# Patient Record
Sex: FEMALE | Race: WHITE | Employment: OTHER | URBAN - METROPOLITAN AREA
[De-identification: names, ages, dates, MRNs, and addresses within clinical notes are randomized per-mention and may not be internally consistent; named-entity substitution may affect disease eponyms.]

---

## 2017-01-12 RX ORDER — BUDESONIDE AND FORMOTEROL FUMARATE DIHYDRATE 160; 4.5 UG/1; UG/1
AEROSOL RESPIRATORY (INHALATION)
Qty: 10.2 INHALER | Refills: 6 | Status: SHIPPED | OUTPATIENT
Start: 2017-01-12 | End: 2017-08-02

## 2017-01-12 RX ORDER — ESCITALOPRAM OXALATE 10 MG/1
TABLET ORAL
Qty: 30 TABLET | Refills: 3 | Status: SHIPPED | OUTPATIENT
Start: 2017-01-12 | End: 2017-04-30

## 2017-01-12 RX ORDER — IPRATROPIUM/ALBUTEROL SULFATE 20-100 MCG
MIST INHALER (GRAM) INHALATION
Qty: 1 INHALER | Refills: 6 | Status: SHIPPED | OUTPATIENT
Start: 2017-01-12 | End: 2017-06-12

## 2017-01-23 ENCOUNTER — OFFICE VISIT (OUTPATIENT)
Dept: INTERNAL MEDICINE CLINIC | Facility: CLINIC | Age: 67
End: 2017-01-23

## 2017-01-23 VITALS
WEIGHT: 193 LBS | DIASTOLIC BLOOD PRESSURE: 70 MMHG | SYSTOLIC BLOOD PRESSURE: 132 MMHG | OXYGEN SATURATION: 95 % | HEART RATE: 56 BPM | TEMPERATURE: 98 F | HEIGHT: 68 IN | BODY MASS INDEX: 29.25 KG/M2

## 2017-01-23 DIAGNOSIS — J43.9 PULMONARY EMPHYSEMA, UNSPECIFIED EMPHYSEMA TYPE (HCC): ICD-10-CM

## 2017-01-23 DIAGNOSIS — R05.9 COUGH: Primary | ICD-10-CM

## 2017-01-23 DIAGNOSIS — F32.4 MAJOR DEPRESSIVE DISORDER WITH SINGLE EPISODE, IN PARTIAL REMISSION (HCC): ICD-10-CM

## 2017-01-23 PROCEDURE — 99212 OFFICE O/P EST SF 10 MIN: CPT | Performed by: INTERNAL MEDICINE

## 2017-01-23 PROCEDURE — 99213 OFFICE O/P EST LOW 20 MIN: CPT | Performed by: INTERNAL MEDICINE

## 2017-01-23 RX ORDER — ALBUTEROL SULFATE 90 UG/1
2 AEROSOL, METERED RESPIRATORY (INHALATION) EVERY 6 HOURS PRN
Qty: 1 INHALER | Refills: 3 | Status: SHIPPED | OUTPATIENT
Start: 2017-01-23 | End: 2017-07-23

## 2017-01-23 RX ORDER — AZITHROMYCIN 250 MG/1
TABLET, FILM COATED ORAL
Qty: 6 TABLET | Refills: 0 | Status: SHIPPED | OUTPATIENT
Start: 2017-01-23 | End: 2017-01-27

## 2017-01-23 NOTE — PATIENT INSTRUCTIONS
ASSESSMENT/PLAN:   Diagnoses and all orders for this visit:    Cough  Patient with moderate cough and wheezing, recommend antibiotic and should call if not better  Pulmonary emphysema, unspecified emphysema type  Patient's combivent is not covered by her i

## 2017-01-23 NOTE — PROGRESS NOTES
HPI:    Patient ID: Alena Weiss is a 61year old female. Cough  This is a new problem. The problem has been gradually worsening. The cough is productive of purulent sputum. Associated symptoms include ear congestion and nasal congestion.  Pertinent well-nourished. No distress. Cardiovascular: Normal rate and regular rhythm. Edema not present. Pulmonary/Chest: Effort normal.   Moderate insp/exp wheezing.             ASSESSMENT/PLAN:   Diagnoses and all orders for this visit:    Cough  Patient wi

## 2017-02-06 RX ORDER — TRIAMTERENE AND HYDROCHLOROTHIAZIDE 37.5; 25 MG/1; MG/1
TABLET ORAL
Qty: 30 TABLET | Refills: 4 | Status: SHIPPED | OUTPATIENT
Start: 2017-02-06 | End: 2017-06-12

## 2017-05-01 RX ORDER — METOPROLOL TARTRATE 50 MG/1
TABLET, FILM COATED ORAL
Qty: 60 TABLET | Refills: 0 | Status: SHIPPED | OUTPATIENT
Start: 2017-05-01 | End: 2017-06-07

## 2017-05-01 RX ORDER — ESCITALOPRAM OXALATE 10 MG/1
TABLET ORAL
Qty: 30 TABLET | Refills: 0 | Status: SHIPPED | OUTPATIENT
Start: 2017-05-01 | End: 2017-06-07

## 2017-05-06 ENCOUNTER — HOSPITAL ENCOUNTER (EMERGENCY)
Facility: HOSPITAL | Age: 67
Discharge: HOME | End: 2017-05-06
Attending: EMERGENCY MEDICINE
Payer: COMMERCIAL

## 2017-05-06 ENCOUNTER — APPOINTMENT (OUTPATIENT)
Dept: GENERAL RADIOLOGY | Facility: HOSPITAL | Age: 67
End: 2017-05-06
Attending: EMERGENCY MEDICINE
Payer: COMMERCIAL

## 2017-05-06 ENCOUNTER — TELEPHONE (OUTPATIENT)
Dept: INTERNAL MEDICINE CLINIC | Facility: CLINIC | Age: 67
End: 2017-05-06

## 2017-05-06 VITALS
HEIGHT: 68 IN | TEMPERATURE: 97 F | OXYGEN SATURATION: 94 % | WEIGHT: 190 LBS | DIASTOLIC BLOOD PRESSURE: 84 MMHG | SYSTOLIC BLOOD PRESSURE: 162 MMHG | RESPIRATION RATE: 18 BRPM | HEART RATE: 75 BPM | BODY MASS INDEX: 28.79 KG/M2

## 2017-05-06 DIAGNOSIS — J44.1 COPD EXACERBATION (HCC): Primary | ICD-10-CM

## 2017-05-06 PROCEDURE — 71010 XR CHEST AP PORTABLE  (CPT=71010): CPT | Performed by: EMERGENCY MEDICINE

## 2017-05-06 PROCEDURE — 99284 EMERGENCY DEPT VISIT MOD MDM: CPT

## 2017-05-06 PROCEDURE — 94644 CONT INHLJ TX 1ST HOUR: CPT

## 2017-05-06 RX ORDER — ALBUTEROL SULFATE 2.5 MG/3ML
2.5 SOLUTION RESPIRATORY (INHALATION) EVERY 4 HOURS PRN
Qty: 30 AMPULE | Refills: 0 | Status: SHIPPED | OUTPATIENT
Start: 2017-05-06 | End: 2021-07-07

## 2017-05-06 RX ORDER — DOXYCYCLINE 100 MG/1
100 CAPSULE ORAL 2 TIMES DAILY
Qty: 14 CAPSULE | Refills: 0 | Status: SHIPPED | OUTPATIENT
Start: 2017-05-06 | End: 2017-05-13

## 2017-05-06 RX ORDER — ALBUTEROL SULFATE 2.5 MG/3ML
2.5 SOLUTION RESPIRATORY (INHALATION) CONTINUOUS
Status: DISCONTINUED | OUTPATIENT
Start: 2017-05-06 | End: 2017-05-06

## 2017-05-06 RX ORDER — PREDNISONE 20 MG/1
60 TABLET ORAL ONCE
Status: COMPLETED | OUTPATIENT
Start: 2017-05-06 | End: 2017-05-06

## 2017-05-06 RX ORDER — PREDNISONE 10 MG/1
60 TABLET ORAL DAILY
Qty: 42 TABLET | Refills: 0 | Status: SHIPPED | OUTPATIENT
Start: 2017-05-06 | End: 2017-05-18

## 2017-05-06 NOTE — TELEPHONE ENCOUNTER
Pt complaining of shortness of breath, has had a cold and chest congestion, inhalers no help. Advised going to ER she agrees and will go.  TN

## 2017-05-06 NOTE — ED PROVIDER NOTES
Patient Seen in: [facility] AND [facility] Emergency Department    History   Patient presents with:  Dyspnea VELIA SOB (respiratory)    Stated Complaint: SOB    HPI    The patient presents complaining of increasing shortness of breath and cough for the past 3-4 da (18 mcg total) into the lungs daily.    Ipratropium Bromide (ATROVENT) 0.02 % Inhalation Solution,         Family History   Problem Relation Age of Onset   • Diabetes Father    • Cancer Mother 76     renal cancer   • Hypertension Mother    • Diabetes Patern place, and time. She appears well-developed and well-nourished. No distress. HENT:   Head: Normocephalic and atraumatic. Eyes: Conjunctivae are normal. Right eye exhibits no discharge. Left eye exhibits no discharge. Neck: Normal range of motion.  Nec mg total) by mouth daily.  Taper 60 mg daily to 0 mg daily over 12 days. (60, 60, 50, 50, 40, 40, 30, 30, 20, 20, 10, 10), Script printed, Disp-42 tablet, R-0    Doxycycline Monohydrate 100 MG Oral Cap  Take 1 capsule (100 mg total) by mouth 2 (two) times d

## 2017-05-06 NOTE — ED INITIAL ASSESSMENT (HPI)
Pt c/o exertional dyspnea since Tuesday, SOB increasing. Pt spoke with PMD and was referred to ED. Hx of COPD, not on o2 at home.

## 2017-05-08 ENCOUNTER — OFFICE VISIT (OUTPATIENT)
Dept: INTERNAL MEDICINE CLINIC | Facility: CLINIC | Age: 67
End: 2017-05-08

## 2017-05-08 VITALS
OXYGEN SATURATION: 94 % | SYSTOLIC BLOOD PRESSURE: 151 MMHG | DIASTOLIC BLOOD PRESSURE: 87 MMHG | HEIGHT: 68 IN | HEART RATE: 66 BPM | BODY MASS INDEX: 28.64 KG/M2 | TEMPERATURE: 98 F | WEIGHT: 189 LBS

## 2017-05-08 DIAGNOSIS — J44.1 COPD EXACERBATION (HCC): Primary | ICD-10-CM

## 2017-05-08 PROCEDURE — 99213 OFFICE O/P EST LOW 20 MIN: CPT | Performed by: INTERNAL MEDICINE

## 2017-05-08 PROCEDURE — 99212 OFFICE O/P EST SF 10 MIN: CPT | Performed by: INTERNAL MEDICINE

## 2017-05-08 NOTE — PROGRESS NOTES
HPI:    Patient ID: Martina Redd is a 61year old female. HPI  COPD exac  Patient had bad cough started 5/1 but didn't call until 5/6 when she was very SOB and was sent to ER 5/7.  She was given 1 hour of nebs with improvement and sent home on predni 0.02 % Inhalation Solution  Disp:  Rfl: 0   azithromycin (ZITHROMAX Z-BRAYDON) 250 MG Oral Tab Take two tablets by mouth today, then one tablet daily.  Disp: 6 tablet Rfl: 0   COMBIVENT RESPIMAT  MCG/ACT Inhalation Aero Soln INHALE 2 PUFFS INTO THE LUNGS

## 2017-05-08 NOTE — PATIENT INSTRUCTIONS
ASSESSMENT/PLAN:   Diagnoses and all orders for this visit:    COPD exacerbation ([facility] [address]  Patient with moderate wheezing and exacerbation of her COPD. Should continue the steroid taper as directed.  Needs to use the nebulizer every 3-4 hours when awake

## 2017-06-07 RX ORDER — ESCITALOPRAM OXALATE 10 MG/1
TABLET ORAL
Qty: 30 TABLET | Refills: 2 | Status: SHIPPED | OUTPATIENT
Start: 2017-06-07 | End: 2017-08-30

## 2017-06-07 NOTE — TELEPHONE ENCOUNTER
Refill protocol failed, labs out of date range.   KK please advise on refill request.    Hypertensive Medications  Protocol Criteria:  · Appointment scheduled in the past 6 months or in the next 3 months  · BMP or CMP in the past 12 months  · Creatinine res

## 2017-06-07 NOTE — TELEPHONE ENCOUNTER
Refill protocol criteria met, rx sent.       Refill Protocol Appointment Criteria  · Appointment scheduled in the past 6 months or in the next 3 months  Recent Visits       Provider Department Primary Dx    1 month ago Michael Chen MD [facility], [facility]

## 2017-06-08 RX ORDER — METOPROLOL TARTRATE 50 MG/1
TABLET, FILM COATED ORAL
Qty: 60 TABLET | Refills: 0 | Status: SHIPPED | OUTPATIENT
Start: 2017-06-08 | End: 2017-06-12

## 2017-06-12 ENCOUNTER — OFFICE VISIT (OUTPATIENT)
Dept: INTERNAL MEDICINE CLINIC | Facility: CLINIC | Age: 67
End: 2017-06-12

## 2017-06-12 VITALS
HEART RATE: 67 BPM | SYSTOLIC BLOOD PRESSURE: 123 MMHG | DIASTOLIC BLOOD PRESSURE: 76 MMHG | OXYGEN SATURATION: 94 % | TEMPERATURE: 99 F | BODY MASS INDEX: 29 KG/M2 | WEIGHT: 191 LBS

## 2017-06-12 DIAGNOSIS — I10 HYPERTENSION, BENIGN: ICD-10-CM

## 2017-06-12 DIAGNOSIS — J43.9 PULMONARY EMPHYSEMA, UNSPECIFIED EMPHYSEMA TYPE (HCC): Primary | ICD-10-CM

## 2017-06-12 PROCEDURE — 99212 OFFICE O/P EST SF 10 MIN: CPT | Performed by: INTERNAL MEDICINE

## 2017-06-12 PROCEDURE — 99213 OFFICE O/P EST LOW 20 MIN: CPT | Performed by: INTERNAL MEDICINE

## 2017-06-12 RX ORDER — METOPROLOL TARTRATE 50 MG/1
50 TABLET, FILM COATED ORAL 2 TIMES DAILY
Qty: 60 TABLET | Refills: 6 | Status: SHIPPED | OUTPATIENT
Start: 2017-06-12 | End: 2018-03-06

## 2017-06-12 RX ORDER — TRIAMTERENE AND HYDROCHLOROTHIAZIDE 37.5; 25 MG/1; MG/1
1 TABLET ORAL
Qty: 30 TABLET | Refills: 6 | Status: SHIPPED | OUTPATIENT
Start: 2017-06-12 | End: 2017-12-16

## 2017-06-12 NOTE — PROGRESS NOTES
HPI:    Patient ID: Ledy Martínez is a 61year old female. HPI  COPD  Patient taking spiriva, symbicort and her rescue. Breathing better than her last exacerbation, Still with miserable cough, gautam in am and later in the evening.  still smoking 2 cigar every 6 (six) hours as needed for Wheezing. Disp: 1 Inhaler Rfl: 3   SYMBICORT 160-4.5 MCG/ACT Inhalation Aerosol INHALE 2 PUFFS INTO THE LUNGS 2 (TWO) TIMES DAILY.  Disp: 10.2 Inhaler Rfl: 6   Tiotropium Bromide Monohydrate 18 MCG Inhalation Cap Inhale 1 c

## 2017-06-12 NOTE — PATIENT INSTRUCTIONS
ASSESSMENT/PLAN:   Diagnoses and all orders for this visit:    Pulmonary emphysema, unspecified emphysema type ([facility] [address]  Patient overall improved, likely related to her continued smoking but better. Encouraged her to stop alltogether.  Recommend she add mu

## 2017-06-19 RX ORDER — TIOTROPIUM BROMIDE 18 UG/1
CAPSULE ORAL; RESPIRATORY (INHALATION)
Qty: 30 CAPSULE | Refills: 6 | Status: SHIPPED | OUTPATIENT
Start: 2017-06-19 | End: 2018-06-20

## 2017-07-18 ENCOUNTER — HOSPITAL ENCOUNTER (EMERGENCY)
Facility: HOSPITAL | Age: 67
Discharge: HOME | End: 2017-07-18
Attending: EMERGENCY MEDICINE
Payer: COMMERCIAL

## 2017-07-18 ENCOUNTER — TELEPHONE (OUTPATIENT)
Dept: INTERNAL MEDICINE CLINIC | Facility: CLINIC | Age: 67
End: 2017-07-18

## 2017-07-18 ENCOUNTER — APPOINTMENT (OUTPATIENT)
Dept: CT IMAGING | Facility: HOSPITAL | Age: 67
End: 2017-07-18
Attending: EMERGENCY MEDICINE
Payer: COMMERCIAL

## 2017-07-18 ENCOUNTER — TELEPHONE (OUTPATIENT)
Dept: DERMATOLOGY CLINIC | Facility: CLINIC | Age: 67
End: 2017-07-18

## 2017-07-18 VITALS
RESPIRATION RATE: 17 BRPM | HEART RATE: 65 BPM | SYSTOLIC BLOOD PRESSURE: 124 MMHG | TEMPERATURE: 99 F | DIASTOLIC BLOOD PRESSURE: 70 MMHG | OXYGEN SATURATION: 97 % | BODY MASS INDEX: 28.79 KG/M2 | HEIGHT: 68 IN | WEIGHT: 190 LBS

## 2017-07-18 DIAGNOSIS — L03.115 CELLULITIS OF RIGHT LOWER EXTREMITY: ICD-10-CM

## 2017-07-18 DIAGNOSIS — R42 EPISODE OF DIZZINESS: Primary | ICD-10-CM

## 2017-07-18 LAB
ANION GAP SERPL CALC-SCNC: 8 MMOL/L (ref 0–18)
BACTERIA UR QL AUTO: NEGATIVE /HPF
BASOPHILS # BLD: 0.1 K/UL (ref 0–0.2)
BASOPHILS NFR BLD: 1 %
BILIRUB UR QL: NEGATIVE
BUN SERPL-MCNC: 21 MG/DL (ref 8–20)
BUN/CREAT SERPL: 16.9 (ref 10–20)
CALCIUM SERPL-MCNC: 8.8 MG/DL (ref 8.5–10.5)
CHLORIDE SERPL-SCNC: 100 MMOL/L (ref 95–110)
CLARITY UR: CLEAR
CO2 SERPL-SCNC: 27 MMOL/L (ref 22–32)
COLOR UR: YELLOW
CREAT SERPL-MCNC: 1.24 MG/DL (ref 0.5–1.5)
EOSINOPHIL # BLD: 0.1 K/UL (ref 0–0.7)
EOSINOPHIL NFR BLD: 1 %
ERYTHROCYTE [DISTWIDTH] IN BLOOD BY AUTOMATED COUNT: 13.7 % (ref 11–15)
GLUCOSE SERPL-MCNC: 112 MG/DL (ref 70–99)
GLUCOSE UR-MCNC: NEGATIVE MG/DL
HCT VFR BLD AUTO: 37.4 % (ref 35–48)
HGB BLD-MCNC: 12.7 G/DL (ref 12–16)
HGB UR QL STRIP.AUTO: NEGATIVE
KETONES UR-MCNC: NEGATIVE MG/DL
LYMPHOCYTES # BLD: 1 K/UL (ref 1–4)
LYMPHOCYTES NFR BLD: 12 %
MCH RBC QN AUTO: 31.1 PG (ref 27–32)
MCHC RBC AUTO-ENTMCNC: 34.1 G/DL (ref 32–37)
MCV RBC AUTO: 91.2 FL (ref 80–100)
MONOCYTES # BLD: 0.8 K/UL (ref 0–1)
MONOCYTES NFR BLD: 9 %
NEUTROPHILS # BLD AUTO: 6.7 K/UL (ref 1.8–7.7)
NEUTROPHILS NFR BLD: 77 %
NITRITE UR QL STRIP.AUTO: NEGATIVE
OSMOLALITY UR CALC.SUM OF ELEC: 284 MOSM/KG (ref 275–295)
PH UR: 7 [PH] (ref 5–8)
PLATELET # BLD AUTO: 151 K/UL (ref 140–400)
PMV BLD AUTO: 7.9 FL (ref 7.4–10.3)
POTASSIUM SERPL-SCNC: 3.5 MMOL/L (ref 3.3–5.1)
PROT UR-MCNC: NEGATIVE MG/DL
RBC # BLD AUTO: 4.1 M/UL (ref 3.7–5.4)
RBC #/AREA URNS AUTO: 2 /HPF
SODIUM SERPL-SCNC: 135 MMOL/L (ref 136–144)
SP GR UR STRIP: 1.01 (ref 1–1.03)
TROPONIN I SERPL-MCNC: 0.01 NG/ML (ref ?–0.03)
TROPONIN I SERPL-MCNC: 0.01 NG/ML (ref ?–0.03)
UROBILINOGEN UR STRIP-ACNC: <2
VIT C UR-MCNC: NEGATIVE MG/DL
WBC # BLD AUTO: 8.7 K/UL (ref 4–11)
WBC #/AREA URNS AUTO: 1 /HPF

## 2017-07-18 PROCEDURE — 93010 ELECTROCARDIOGRAM REPORT: CPT | Performed by: EMERGENCY MEDICINE

## 2017-07-18 PROCEDURE — 70450 CT HEAD/BRAIN W/O DYE: CPT | Performed by: EMERGENCY MEDICINE

## 2017-07-18 PROCEDURE — 96360 HYDRATION IV INFUSION INIT: CPT

## 2017-07-18 PROCEDURE — 99285 EMERGENCY DEPT VISIT HI MDM: CPT

## 2017-07-18 PROCEDURE — 93005 ELECTROCARDIOGRAM TRACING: CPT

## 2017-07-18 PROCEDURE — 80048 BASIC METABOLIC PNL TOTAL CA: CPT | Performed by: EMERGENCY MEDICINE

## 2017-07-18 PROCEDURE — 85025 COMPLETE CBC W/AUTO DIFF WBC: CPT | Performed by: EMERGENCY MEDICINE

## 2017-07-18 PROCEDURE — 84484 ASSAY OF TROPONIN QUANT: CPT | Performed by: EMERGENCY MEDICINE

## 2017-07-18 PROCEDURE — 96361 HYDRATE IV INFUSION ADD-ON: CPT

## 2017-07-18 PROCEDURE — 81001 URINALYSIS AUTO W/SCOPE: CPT | Performed by: EMERGENCY MEDICINE

## 2017-07-18 RX ORDER — CEPHALEXIN 500 MG/1
500 CAPSULE ORAL 3 TIMES DAILY
Qty: 30 CAPSULE | Refills: 0 | Status: SHIPPED | OUTPATIENT
Start: 2017-07-18 | End: 2017-07-28

## 2017-07-18 NOTE — TELEPHONE ENCOUNTER
Patient states had episode today of feeling weak, dizzy, nausea, and then vomited clear was taking shower. Denies chest pain or SOB.    Do you want her to go to ER?

## 2017-07-18 NOTE — ED INITIAL ASSESSMENT (HPI)
Pt presents to ED after near syncopal episode at home pta. Pt states episode lasted apprx. 30 minutes and she felt \"dizzy and nauseated\". Denies cp or sob. Also wants right ankle \"bite\" evaluated.

## 2017-07-18 NOTE — TELEPHONE ENCOUNTER
Pt contacted. . .     Assessment and problem) Pt states that Saturday she was at an Party outside and was bitten by insects on her right ankle.   Pt notes by Sunday 7/16 the ankle was red, swollen, hot to the touch and pt c/o burning pain and pruritus to th

## 2017-07-18 NOTE — ED PROVIDER NOTES
Patient Seen in: [facility] AND [facility] Emergency Department    History   Patient presents with:  Dizziness  Nausea    Stated Complaint: dizzy/nausea this morning    HPI    61year old female smoker (now 3-4 cigarettes/day) with pmh COPD and HTN who presents DAILY.        Family History   Problem Relation Age of Onset   • Diabetes Father    • Cancer Mother 76     renal cancer   • Hypertension Mother    • Diabetes Paternal Grandmother    • Cancer Sister      cervical cancer   • Cancer Brother      prostate cance oriented to person, place, and time. She has normal strength. She is not disoriented. No cranial nerve deficit or sensory deficit. Gait normal. GCS eye subscore is 4. GCS verbal subscore is 5. GCS motor subscore is 6.    No pronator drift, normal finger–nos Encounters:  07/18/17 : 65  , sinus, normal for rate and rhythm     Radiology findings: Ct Brain Or Head (12195)    Result Date: 7/18/2017  CONCLUSION:  1. No acute intracranial finding. 2. A 1.6 x 1.5 x 1.4 cm right paramedian frontal meningioma.  3. Intra

## 2017-07-18 NOTE — TELEPHONE ENCOUNTER
Attempted to call pt. She asks to hold on and telephone call was disconnected on her end. Attempted to call back. LMTCB on pt's voice mail.      LSS, any openings today for pt?

## 2017-07-18 NOTE — TELEPHONE ENCOUNTER
Pt contacted, accepted appt for 1:30 today with LSS. Appt scheduled. Pt did speak with nurse at Dr. Tee Bishop office regarding dizziness and nausea/ vomiting.

## 2017-07-18 NOTE — TELEPHONE ENCOUNTER
Per Dr. Melissa De La Vega' office. Pt admitted to [address] [place] [address] . Appt today has been cancelled.

## 2017-07-18 NOTE — TELEPHONE ENCOUNTER
Patient notified by phone should go to ER not to drive has someone to take her there to be evaluated. Patient request we cancel appt with Dr. Arvella Cranker at 1:30 pm today will not be able to make appt. Office notified.

## 2017-07-19 ENCOUNTER — TELEPHONE (OUTPATIENT)
Dept: DERMATOLOGY CLINIC | Facility: CLINIC | Age: 67
End: 2017-07-19

## 2017-07-19 NOTE — TELEPHONE ENCOUNTER
S/w pt - she had to cancel her appt due to ER visit r/t syncopal episode. States she was told she has cellulitis and was placed on cephalexin. States she was not informed what the bite could be - states right ankle is still red, swollen and warm to touch.

## 2017-07-19 NOTE — TELEPHONE ENCOUNTER
Pt is calling back to see if we can squeeze her in again, she was suppose to come in on 7/18 and then ended up in the ER and would like to be readded in quickly for a bite on her ankle pls advise

## 2017-07-19 NOTE — TELEPHONE ENCOUNTER
May add on pt at 10:45 tomorrow, but if she has cellulitis, will take more than 1 day to see improvement from the cephalexin

## 2017-07-19 NOTE — TELEPHONE ENCOUNTER
S/w pt - she states she has decided to wait few days and see how she does on the cephalexin. She also has a follow up with her PCP next Monday and she will show him her leg then too.  Will CB should things change or there's no improvement on the keflex

## 2017-07-24 ENCOUNTER — OFFICE VISIT (OUTPATIENT)
Dept: INTERNAL MEDICINE CLINIC | Facility: CLINIC | Age: 67
End: 2017-07-24

## 2017-07-24 VITALS
RESPIRATION RATE: 20 BRPM | OXYGEN SATURATION: 96 % | TEMPERATURE: 98 F | HEIGHT: 68 IN | WEIGHT: 191 LBS | DIASTOLIC BLOOD PRESSURE: 80 MMHG | HEART RATE: 59 BPM | SYSTOLIC BLOOD PRESSURE: 140 MMHG | BODY MASS INDEX: 28.95 KG/M2

## 2017-07-24 DIAGNOSIS — I10 HYPERTENSION, BENIGN: ICD-10-CM

## 2017-07-24 DIAGNOSIS — D32.9 MENINGIOMA (HCC): Primary | ICD-10-CM

## 2017-07-24 PROCEDURE — 99213 OFFICE O/P EST LOW 20 MIN: CPT | Performed by: INTERNAL MEDICINE

## 2017-07-24 PROCEDURE — 99212 OFFICE O/P EST SF 10 MIN: CPT | Performed by: INTERNAL MEDICINE

## 2017-07-24 RX ORDER — ALBUTEROL SULFATE 90 MCG
2 HFA AEROSOL WITH ADAPTER (GRAM) INHALATION EVERY 6 HOURS PRN
Qty: 6.7 INHALER | Refills: 0 | Status: SHIPPED | OUTPATIENT
Start: 2017-07-24 | End: 2017-07-26

## 2017-07-25 ENCOUNTER — TELEPHONE (OUTPATIENT)
Dept: INTERNAL MEDICINE CLINIC | Facility: CLINIC | Age: 67
End: 2017-07-25

## 2017-07-25 DIAGNOSIS — D32.9 MENINGIOMA (HCC): Primary | ICD-10-CM

## 2017-07-25 DIAGNOSIS — R55 NEAR SYNCOPE: ICD-10-CM

## 2017-07-25 NOTE — PROGRESS NOTES
HPI:    Patient ID: Rhiannon Velazquez is a 61year old female. HPI   Dizziness  Patient had episode of dizziness, lightheaded and nausea. Denies HA but had vomiting.  Went to ER and eval was all negative, symptoms resolved rather quickly but had mild naus 160-4.5 MCG/ACT Inhalation Aerosol INHALE 2 PUFFS INTO THE LUNGS 2 (TWO) TIMES DAILY.  Disp: 10.2 Inhaler Rfl: 6     Allergies:  Amoxicillin                 Comment:Other reaction(s): GI upset  Meloxicam                 Varenicline                 Comment:O

## 2017-07-25 NOTE — PATIENT INSTRUCTIONS
ASSESSMENT/PLAN:   Diagnoses and all orders for this visit:    Meningioma [facility])  Patient advised as to the benign nature of this tumor, needs MRI and to see neurosurgeon for evaluation  Hypertension, benign  BP controlled  Regarding the lightheadedness, ad

## 2017-07-25 NOTE — TELEPHONE ENCOUNTER
1) Dr. Muñoz Bourne not in her network for insurance. Can you suggest and refer to another neurologist?  2) Asking for order to check carotid arteries, stated ER told her she should have that checked and she forgot to bring that up.

## 2017-07-25 NOTE — TELEPHONE ENCOUNTER
Left message on voice mail is patient could check with insurance company to see who is in network on her plan and let me know  Will try managed care in morning.

## 2017-07-26 NOTE — TELEPHONE ENCOUNTER
Patient asking if MRI, Dr. Buffy Starr and carotid ultrasound can wait until 10/1/17 when she is on Medicare. ?? If not will have this done now.

## 2017-07-26 NOTE — TELEPHONE ENCOUNTER
I think she can wait.  I spoke with dr Molly Barrera who said he would probably just recheck MRI in 6-12 months so she can wait on all of them and would be covered under medicare

## 2017-07-28 RX ORDER — ALBUTEROL SULFATE 90 UG/1
2 AEROSOL, METERED RESPIRATORY (INHALATION) EVERY 6 HOURS PRN
Qty: 6.7 INHALER | Refills: 3 | Status: SHIPPED
Start: 2017-07-28 | End: 2018-12-12

## 2017-07-28 NOTE — TELEPHONE ENCOUNTER
From: Alena Weiss  Sent: 7/26/2017 2:19 PM CDT  Subject: Medication Renewal Request    Serene Mcelroy.  Filemon Mesa would like a refill of the following medications:  PROVENTIL  (90 Base) MCG/ACT Inhalation Aero Solprimo Dalton MD]    Preferred

## 2017-08-01 RX ORDER — TRIAMTERENE AND HYDROCHLOROTHIAZIDE 37.5; 25 MG/1; MG/1
TABLET ORAL
Qty: 30 TABLET | Refills: 4 | Status: SHIPPED | OUTPATIENT
Start: 2017-08-01 | End: 2017-10-02

## 2017-08-01 NOTE — TELEPHONE ENCOUNTER
Dr. Rowan Miranda,  Dr. Yuko Wooten is not in network with patient's INS. Dr. Parish Taylor is.   Can I change referral? Thank Isabel Gunter

## 2017-08-02 RX ORDER — BUDESONIDE AND FORMOTEROL FUMARATE DIHYDRATE 160; 4.5 UG/1; UG/1
AEROSOL RESPIRATORY (INHALATION)
Qty: 10.2 INHALER | Refills: 6 | Status: SHIPPED | OUTPATIENT
Start: 2017-08-02 | End: 2018-06-20

## 2017-08-30 RX ORDER — ESCITALOPRAM OXALATE 10 MG/1
TABLET ORAL
Qty: 30 TABLET | Refills: 3 | Status: SHIPPED | OUTPATIENT
Start: 2017-08-30 | End: 2018-02-04 | Stop reason: SDUPTHER

## 2017-10-02 ENCOUNTER — OFFICE VISIT (OUTPATIENT)
Dept: INTERNAL MEDICINE CLINIC | Facility: CLINIC | Age: 67
End: 2017-10-02

## 2017-10-02 ENCOUNTER — MED REC SCAN ONLY (OUTPATIENT)
Dept: INTERNAL MEDICINE CLINIC | Facility: CLINIC | Age: 67
End: 2017-10-02

## 2017-10-02 VITALS
DIASTOLIC BLOOD PRESSURE: 81 MMHG | WEIGHT: 194 LBS | SYSTOLIC BLOOD PRESSURE: 129 MMHG | BODY MASS INDEX: 29.4 KG/M2 | HEART RATE: 62 BPM | HEIGHT: 68 IN

## 2017-10-02 DIAGNOSIS — I10 HYPERTENSION, BENIGN: Primary | ICD-10-CM

## 2017-10-02 DIAGNOSIS — J43.9 PULMONARY EMPHYSEMA, UNSPECIFIED EMPHYSEMA TYPE (HCC): ICD-10-CM

## 2017-10-02 PROCEDURE — G0009 ADMIN PNEUMOCOCCAL VACCINE: HCPCS | Performed by: INTERNAL MEDICINE

## 2017-10-02 PROCEDURE — 99213 OFFICE O/P EST LOW 20 MIN: CPT | Performed by: INTERNAL MEDICINE

## 2017-10-02 PROCEDURE — G0463 HOSPITAL OUTPT CLINIC VISIT: HCPCS | Performed by: INTERNAL MEDICINE

## 2017-10-02 PROCEDURE — G0008 ADMIN INFLUENZA VIRUS VAC: HCPCS | Performed by: INTERNAL MEDICINE

## 2017-10-02 PROCEDURE — 90670 PCV13 VACCINE IM: CPT | Performed by: INTERNAL MEDICINE

## 2017-10-02 PROCEDURE — 90653 IIV ADJUVANT VACCINE IM: CPT | Performed by: INTERNAL MEDICINE

## 2017-10-02 NOTE — PATIENT INSTRUCTIONS
ASSESSMENT/PLAN:   Diagnoses and all orders for this visit:    Hypertension, benign  BP overall doing well. Continue current meds  Pulmonary emphysema, unspecified emphysema type ([facility] [address]  Will try respimat and see if this helps.    Needs flu shot

## 2017-10-02 NOTE — PROGRESS NOTES
HPI:    Patient ID: Elan Maciel is a 61year old female. HPI   No further dizziness  Hasn't scheduled her MRI yet, will do it now   Hypertension  Patient is here for follow up of hypertension.  BP at home: not checking  Has been compliant with medic Metoprolol Tartrate 50 MG Oral Tab Take 1 tablet (50 mg total) by mouth 2 (two) times daily. Disp: 60 tablet Rfl: 6   Tiotropium Bromide Monohydrate 18 MCG Inhalation Cap Inhale 1 capsule (18 mcg total) into the lungs daily.  Disp: 30 capsule Rfl: 6   Tri

## 2017-10-10 ENCOUNTER — TELEPHONE (OUTPATIENT)
Dept: INTERNAL MEDICINE CLINIC | Facility: CLINIC | Age: 67
End: 2017-10-10

## 2017-10-10 DIAGNOSIS — D32.9 MENINGIOMA (HCC): Primary | ICD-10-CM

## 2017-10-10 RX ORDER — LORAZEPAM 0.5 MG/1
0.5 TABLET ORAL AS NEEDED
Qty: 2 TABLET | Refills: 0 | Status: SHIPPED | OUTPATIENT
Start: 2017-10-10 | End: 2018-04-02

## 2017-10-10 NOTE — TELEPHONE ENCOUNTER
Going for MRI and Carotid ultrasound on Tuesday 10/17/17. Asking for new neuro surgeon referral  Dr. Sosa Whelan no longer with [facility].  Also needs something to take before MRI get claustrophobia.

## 2017-10-17 ENCOUNTER — HOSPITAL ENCOUNTER (OUTPATIENT)
Dept: ULTRASOUND IMAGING | Age: 67
Discharge: HOME | End: 2017-10-17
Attending: INTERNAL MEDICINE
Payer: MEDICARE

## 2017-10-17 ENCOUNTER — HOSPITAL ENCOUNTER (OUTPATIENT)
Dept: MRI IMAGING | Age: 67
Discharge: HOME | End: 2017-10-17
Attending: INTERNAL MEDICINE
Payer: MEDICARE

## 2017-10-17 DIAGNOSIS — R55 NEAR SYNCOPE: ICD-10-CM

## 2017-10-17 DIAGNOSIS — D32.9 MENINGIOMA (HCC): ICD-10-CM

## 2017-10-17 PROCEDURE — 82565 ASSAY OF CREATININE: CPT

## 2017-10-17 PROCEDURE — 70553 MRI BRAIN STEM W/O & W/DYE: CPT | Performed by: INTERNAL MEDICINE

## 2017-10-17 PROCEDURE — A9575 INJ GADOTERATE MEGLUMI 0.1ML: HCPCS | Performed by: INTERNAL MEDICINE

## 2017-10-17 PROCEDURE — 93880 EXTRACRANIAL BILAT STUDY: CPT | Performed by: INTERNAL MEDICINE

## 2017-12-15 ENCOUNTER — OFFICE VISIT (OUTPATIENT)
Dept: INTERNAL MEDICINE CLINIC | Facility: CLINIC | Age: 67
End: 2017-12-15

## 2017-12-15 ENCOUNTER — TELEPHONE (OUTPATIENT)
Dept: INTERNAL MEDICINE CLINIC | Facility: CLINIC | Age: 67
End: 2017-12-15

## 2017-12-15 VITALS
TEMPERATURE: 98 F | SYSTOLIC BLOOD PRESSURE: 133 MMHG | HEART RATE: 64 BPM | RESPIRATION RATE: 18 BRPM | HEIGHT: 68 IN | WEIGHT: 194 LBS | DIASTOLIC BLOOD PRESSURE: 84 MMHG | BODY MASS INDEX: 29.4 KG/M2 | OXYGEN SATURATION: 9 %

## 2017-12-15 DIAGNOSIS — R42 DIZZINESS: Primary | ICD-10-CM

## 2017-12-15 DIAGNOSIS — M25.512 ACUTE PAIN OF LEFT SHOULDER: ICD-10-CM

## 2017-12-15 PROCEDURE — 99213 OFFICE O/P EST LOW 20 MIN: CPT | Performed by: INTERNAL MEDICINE

## 2017-12-15 PROCEDURE — 36415 COLL VENOUS BLD VENIPUNCTURE: CPT | Performed by: INTERNAL MEDICINE

## 2017-12-15 NOTE — TELEPHONE ENCOUNTER
Pt states on Wednesday had a spell of nausea, vomiting, dizziness, and sweating. States episode only happened that day. States since episode has been having a pain on left shoulder and elbow  Rating 9/10 at this time. Pt states only feeling nauseas today.

## 2017-12-15 NOTE — PROGRESS NOTES
HPI:    Patient ID: Kusum Fong is a 74year old female. HPI  Dizziness  2 days ago, patient had another episode of feeling nauseous, lightheaded and very dizzy. Vomited x1. Lasted for few minutes then improved.  Felt a little weak after, went home needed for Wheezing or Shortness of Breath. Disp: 30 ampule Rfl: 0   LORazepam 0.5 MG Oral Tab Take 1 tablet (0.5 mg total) by mouth as needed for Anxiety (1 hour prior to procedure).  Disp: 2 tablet Rfl: 0     Allergies:  Amoxicillin                 Commen

## 2017-12-15 NOTE — PATIENT INSTRUCTIONS
ASSESSMENT/PLAN:   Diagnoses and all orders for this visit:    Dizziness  -     CBC WITH DIFFERENTIAL WITH PLATELET; Future  -     COMP METABOLIC PANEL (14);  Future  -     ASSAY, THYROID STIM HORMONE; Future  -     VITAMIN B12; Future  Patient with recurre

## 2017-12-20 ENCOUNTER — TELEPHONE (OUTPATIENT)
Dept: INTERNAL MEDICINE CLINIC | Facility: CLINIC | Age: 67
End: 2017-12-20

## 2017-12-20 RX ORDER — ALBUTEROL SULFATE 90 UG/1
2 AEROSOL, METERED RESPIRATORY (INHALATION) EVERY 6 HOURS PRN
Qty: 1 INHALER | Refills: 3 | Status: SHIPPED | OUTPATIENT
Start: 2017-12-20 | End: 2018-06-20

## 2017-12-20 RX ORDER — ALBUTEROL SULFATE 90 UG/1
2 AEROSOL, METERED RESPIRATORY (INHALATION) EVERY 6 HOURS PRN
Qty: 1 INHALER | Refills: 3 | Status: SHIPPED | OUTPATIENT
Start: 2017-12-20 | End: 2017-12-20

## 2017-12-20 RX ORDER — ALBUTEROL SULFATE 90 UG/1
AEROSOL, METERED RESPIRATORY (INHALATION)
Qty: 1 INHALER | Refills: 3 | Status: SHIPPED | OUTPATIENT
Start: 2017-12-20 | End: 2017-12-20

## 2017-12-20 RX ORDER — ALBUTEROL SULFATE 90 UG/1
AEROSOL, METERED RESPIRATORY (INHALATION)
COMMUNITY
Start: 2017-11-16 | End: 2017-12-20

## 2017-12-20 RX ORDER — ESCITALOPRAM OXALATE 10 MG/1
TABLET ORAL
COMMUNITY
Start: 2017-11-16 | End: 2018-02-04

## 2018-01-02 ENCOUNTER — LAB ENCOUNTER (OUTPATIENT)
Dept: LAB | Age: 68
End: 2018-01-02
Attending: INTERNAL MEDICINE
Payer: MEDICARE

## 2018-01-02 DIAGNOSIS — I10 HYPERTENSION, BENIGN: ICD-10-CM

## 2018-01-02 LAB
ANION GAP SERPL CALC-SCNC: 8 MMOL/L (ref 0–18)
BUN SERPL-MCNC: 17 MG/DL (ref 8–20)
BUN/CREAT SERPL: 13.6 (ref 10–20)
CALCIUM SERPL-MCNC: 9.1 MG/DL (ref 8.5–10.5)
CHLORIDE SERPL-SCNC: 105 MMOL/L (ref 95–110)
CO2 SERPL-SCNC: 25 MMOL/L (ref 22–32)
CREAT SERPL-MCNC: 1.25 MG/DL (ref 0.5–1.5)
GLUCOSE SERPL-MCNC: 92 MG/DL (ref 70–99)
OSMOLALITY UR CALC.SUM OF ELEC: 287 MOSM/KG (ref 275–295)
POTASSIUM SERPL-SCNC: 4 MMOL/L (ref 3.3–5.1)
SODIUM SERPL-SCNC: 138 MMOL/L (ref 136–144)

## 2018-01-02 PROCEDURE — 36415 COLL VENOUS BLD VENIPUNCTURE: CPT

## 2018-01-02 PROCEDURE — 80048 BASIC METABOLIC PNL TOTAL CA: CPT

## 2018-01-05 ENCOUNTER — OFFICE VISIT (OUTPATIENT)
Dept: PHYSICAL THERAPY | Age: 68
End: 2018-01-05
Attending: INTERNAL MEDICINE
Payer: MEDICARE

## 2018-01-05 DIAGNOSIS — M25.512 ACUTE PAIN OF LEFT SHOULDER: ICD-10-CM

## 2018-01-05 PROCEDURE — 97112 NEUROMUSCULAR REEDUCATION: CPT | Performed by: PHYSICAL THERAPIST

## 2018-01-05 PROCEDURE — 97162 PT EVAL MOD COMPLEX 30 MIN: CPT | Performed by: PHYSICAL THERAPIST

## 2018-01-05 PROCEDURE — 97530 THERAPEUTIC ACTIVITIES: CPT | Performed by: PHYSICAL THERAPIST

## 2018-01-05 NOTE — PROGRESS NOTES
CERVICAL SPINE/UPPER EXTREMITY EVALUATION:   Referring Physician: Arely Rehman MD    Diagnosis: Acute pain of left shoulder (M25.512) Date of Service: 1/5/2018   Date of Onset:November 2017       SUBJECTIVE:   PATIENT SUMMARY:  Anita Ruelas i Recent Infection N   Sleep Disturbance Y; due to shoulder     Past Medical History Comments   Surgeries N   Smoker Y   Drinker Occasional   Diabetes N   Hypertension Y   Hypercholesterinemia N           ASSESSMENT     Fayrene Rape presents for PT evaluation w Therapeutic Exercise    Manual Therapy Cervical spine facet articulation to restore mechanics   Neuromuscular Re-education Posture training; posture education and cues for correction with ADLs   Serratus wall slides   Therapeutic Activity  Provided adenike discussed and agreed upon by: patient   Patient was advised of these findings, precautions, and treatment options and has agreed to actively participate in planning and for this course of care.     Thank you for your referral and the opportunity to assist i

## 2018-01-11 ENCOUNTER — OFFICE VISIT (OUTPATIENT)
Dept: PHYSICAL THERAPY | Age: 68
End: 2018-01-11
Attending: INTERNAL MEDICINE
Payer: MEDICARE

## 2018-01-11 DIAGNOSIS — M25.512 ACUTE PAIN OF LEFT SHOULDER: ICD-10-CM

## 2018-01-11 PROCEDURE — 97140 MANUAL THERAPY 1/> REGIONS: CPT | Performed by: PHYSICAL THERAPIST

## 2018-01-11 PROCEDURE — 97112 NEUROMUSCULAR REEDUCATION: CPT | Performed by: PHYSICAL THERAPIST

## 2018-01-11 PROCEDURE — 97110 THERAPEUTIC EXERCISES: CPT | Performed by: PHYSICAL THERAPIST

## 2018-01-11 NOTE — PROGRESS NOTES
Name: Gavin Manual  Date: 1/11/2018  Dx: Acute pain of left shoulder (M25.512)        Authorized # of Visits:  2/12        Next MD visit: none scheduled  Fall Risk: standard         Precautions: n/a           Medication Changes since last visit?: No 55% impaired. Reviewed goals with patient on 1/11/2018. Patient is in agreement with plan of care. Plan: Continue to advance scapular mobility and neuromuscular control to reduce symptoms with overhead and occupational tasks.     Charges: 1 Manual, 1 Abhay

## 2018-01-16 ENCOUNTER — OFFICE VISIT (OUTPATIENT)
Dept: PHYSICAL THERAPY | Age: 68
End: 2018-01-16
Attending: INTERNAL MEDICINE
Payer: MEDICARE

## 2018-01-16 DIAGNOSIS — M25.512 ACUTE PAIN OF LEFT SHOULDER: ICD-10-CM

## 2018-01-16 PROCEDURE — 97112 NEUROMUSCULAR REEDUCATION: CPT | Performed by: PHYSICAL THERAPIST

## 2018-01-16 PROCEDURE — 97140 MANUAL THERAPY 1/> REGIONS: CPT | Performed by: PHYSICAL THERAPIST

## 2018-01-16 PROCEDURE — 97530 THERAPEUTIC ACTIVITIES: CPT | Performed by: PHYSICAL THERAPIST

## 2018-01-16 NOTE — PROGRESS NOTES
Name: Moshe Hernandez  Date: 1/11/2018  Dx: Acute pain of left shoulder (M25.512)        Authorized # of Visits:  2/12        Next MD visit: none scheduled  Fall Risk: standard         Precautions: n/a           Medication Changes since last visit?: No compensation and weightshifting in stance to limit reaching. Goals:   Long Term Goals Timeframe (6 weeks, 12 sessions)  1.  Patient to be independent with progressive HEP during episode of care and upon discharge to aide with symptom management and retur

## 2018-01-18 ENCOUNTER — OFFICE VISIT (OUTPATIENT)
Dept: PHYSICAL THERAPY | Age: 68
End: 2018-01-18
Attending: INTERNAL MEDICINE
Payer: MEDICARE

## 2018-01-18 DIAGNOSIS — M25.512 ACUTE PAIN OF LEFT SHOULDER: ICD-10-CM

## 2018-01-18 PROCEDURE — 97112 NEUROMUSCULAR REEDUCATION: CPT | Performed by: PHYSICAL THERAPIST

## 2018-01-18 PROCEDURE — 97530 THERAPEUTIC ACTIVITIES: CPT | Performed by: PHYSICAL THERAPIST

## 2018-01-18 PROCEDURE — 97140 MANUAL THERAPY 1/> REGIONS: CPT | Performed by: PHYSICAL THERAPIST

## 2018-01-18 NOTE — PROGRESS NOTES
Name: Nga Miners  Dx: Acute pain of left shoulder (M25.512)        Authorized # of Visits:  4/12        Next MD visit: none scheduled  Fall Risk: standard         Precautions: n/a           Medication Changes since last visit?: No    Subjective: Ivania addition to increased active concentric-eccentric retraining.  Advanced scapular stabilization and focused on  Interventions to restore joint and soft tissue mechanics while also addressing underlying adverse neural signs at L. NFR techniques did aid in mariam

## 2018-01-23 ENCOUNTER — OFFICE VISIT (OUTPATIENT)
Dept: PHYSICAL THERAPY | Age: 68
End: 2018-01-23
Attending: INTERNAL MEDICINE
Payer: MEDICARE

## 2018-01-23 DIAGNOSIS — M25.512 ACUTE PAIN OF LEFT SHOULDER: ICD-10-CM

## 2018-01-23 PROCEDURE — 97112 NEUROMUSCULAR REEDUCATION: CPT | Performed by: PHYSICAL THERAPIST

## 2018-01-23 PROCEDURE — 97140 MANUAL THERAPY 1/> REGIONS: CPT | Performed by: PHYSICAL THERAPIST

## 2018-01-23 PROCEDURE — 97110 THERAPEUTIC EXERCISES: CPT | Performed by: PHYSICAL THERAPIST

## 2018-01-23 NOTE — PROGRESS NOTES
Name: John Crouch  Dx: Acute pain of left shoulder (M25.512)        Authorized # of Visits:  5/12        Next MD visit: none scheduled  Fall Risk: standard         Precautions: n/a           Medication Changes since last visit?: No    Subjective: Ivania Therapeutic Activity  Discussed strategies for occupational tasks; UE position, weightshift in standing to limit reaching Instructed in support of LUE while seated, reading, etc to reduce adverse neural signs ---                  Assessment: Decreased ad

## 2018-01-25 ENCOUNTER — APPOINTMENT (OUTPATIENT)
Dept: PHYSICAL THERAPY | Age: 68
End: 2018-01-25
Attending: INTERNAL MEDICINE
Payer: MEDICARE

## 2018-01-29 ENCOUNTER — TELEPHONE (OUTPATIENT)
Dept: PHYSICAL THERAPY | Age: 68
End: 2018-01-29

## 2018-01-30 ENCOUNTER — APPOINTMENT (OUTPATIENT)
Dept: PHYSICAL THERAPY | Age: 68
End: 2018-01-30
Attending: INTERNAL MEDICINE
Payer: MEDICARE

## 2018-02-01 ENCOUNTER — APPOINTMENT (OUTPATIENT)
Dept: PHYSICAL THERAPY | Age: 68
End: 2018-02-01
Attending: INTERNAL MEDICINE
Payer: MEDICARE

## 2018-02-04 RX ORDER — TRIAMTERENE AND HYDROCHLOROTHIAZIDE 37.5; 25 MG/1; MG/1
TABLET ORAL
Qty: 90 TABLET | Refills: 0 | Status: SHIPPED | OUTPATIENT
Start: 2018-02-04 | End: 2018-06-08

## 2018-02-04 RX ORDER — ESCITALOPRAM OXALATE 10 MG/1
TABLET ORAL
Qty: 90 TABLET | Refills: 0 | Status: SHIPPED | OUTPATIENT
Start: 2018-02-04 | End: 2018-06-28

## 2018-02-06 ENCOUNTER — OFFICE VISIT (OUTPATIENT)
Dept: PHYSICAL THERAPY | Age: 68
End: 2018-02-06
Attending: INTERNAL MEDICINE
Payer: MEDICARE

## 2018-02-06 DIAGNOSIS — M25.512 ACUTE PAIN OF LEFT SHOULDER: ICD-10-CM

## 2018-02-06 PROCEDURE — 97140 MANUAL THERAPY 1/> REGIONS: CPT | Performed by: PHYSICAL THERAPIST

## 2018-02-06 PROCEDURE — 97530 THERAPEUTIC ACTIVITIES: CPT | Performed by: PHYSICAL THERAPIST

## 2018-02-06 PROCEDURE — 97110 THERAPEUTIC EXERCISES: CPT | Performed by: PHYSICAL THERAPIST

## 2018-02-06 NOTE — PROGRESS NOTES
Name: Miguel Crump  Dx: Acute pain of left shoulder (M25.512)        Authorized # of Visits:  6/12        Next MD visit: none scheduled  Fall Risk: standard         Precautions: n/a           Medication Changes since last visit?: No    Subjective: Ivania 10x2 - serratus prolonged hold x8,  5 sec  - lower trap set with hold with lift 8x2, 5 sec hold  -  Serratus slides at wall 10x2  - Upper trapezius shrug 5x2, 5 sec hold - prone mid trap training  -  Serratus slides at wall 10x2     Therapeutic Activity  D

## 2018-02-08 ENCOUNTER — OFFICE VISIT (OUTPATIENT)
Dept: PHYSICAL THERAPY | Age: 68
End: 2018-02-08
Attending: INTERNAL MEDICINE
Payer: MEDICARE

## 2018-02-08 DIAGNOSIS — M25.512 ACUTE PAIN OF LEFT SHOULDER: ICD-10-CM

## 2018-02-08 PROCEDURE — 97530 THERAPEUTIC ACTIVITIES: CPT | Performed by: PHYSICAL THERAPIST

## 2018-02-08 PROCEDURE — 97112 NEUROMUSCULAR REEDUCATION: CPT | Performed by: PHYSICAL THERAPIST

## 2018-02-08 PROCEDURE — 97140 MANUAL THERAPY 1/> REGIONS: CPT | Performed by: PHYSICAL THERAPIST

## 2018-02-09 NOTE — PROGRESS NOTES
Name: Martina Redd  Dx: Acute pain of left shoulder (M25.512)        Authorized # of Visits:  7/12        Next MD visit: none scheduled  Fall Risk: standard         Precautions: n/a           Medication Changes since last visit?: No    Subjective: Ivania hold with lift 8x2, 5 sec hold  -  Serratus slides at wall 10x2  - Upper trapezius shrug 5x2, 5 sec hold - prone mid trap training  -  Serratus slides at wall 10x2   - serratus prolonged hold x8,  5 sec  - lower trap set with hold palm down with lift 8x2, tomorrow; will return to PT next week.     Charges: 1 Manual, 1 TA, 1 Neuro     Total Timed Treatment: 40 min Total Treatment Time: 40 min

## 2018-02-13 ENCOUNTER — OFFICE VISIT (OUTPATIENT)
Dept: PHYSICAL THERAPY | Age: 68
End: 2018-02-13
Attending: INTERNAL MEDICINE
Payer: MEDICARE

## 2018-02-13 PROCEDURE — 97140 MANUAL THERAPY 1/> REGIONS: CPT | Performed by: PHYSICAL THERAPIST

## 2018-02-13 PROCEDURE — 97112 NEUROMUSCULAR REEDUCATION: CPT | Performed by: PHYSICAL THERAPIST

## 2018-02-13 NOTE — PROGRESS NOTES
Name: Vertie Signs  Dx: Acute pain of left shoulder (M25.512)        Authorized # of Visits:  8/12        Next MD visit: none scheduled  Fall Risk: standard         Precautions: n/a           Medication Changes since last visit?: No    Subjective: Ivania lower trap set with hold 8x2, 5 sec hold  -  Serratus slides at wall 10x2 - serratus prolonged hold x8,  5 sec  - lower trap set with hold with lift 8x2, 5 sec hold  -  Serratus slides at wall 10x2  - Upper trapezius shrug 5x2, 5 sec hold - prone mid trap Continue to advance scapular mobility and neuromuscular control to reduce symptoms with overhead and occupational tasks. Patient to undergo cataract surgery tomorrow; will return to PT next week.     Charges: 2 Manual, 1 Neuro     Total Timed Treatment: 40

## 2018-02-15 ENCOUNTER — OFFICE VISIT (OUTPATIENT)
Dept: PHYSICAL THERAPY | Age: 68
End: 2018-02-15
Attending: INTERNAL MEDICINE
Payer: MEDICARE

## 2018-02-15 PROCEDURE — 97140 MANUAL THERAPY 1/> REGIONS: CPT | Performed by: PHYSICAL THERAPIST

## 2018-02-15 PROCEDURE — 97112 NEUROMUSCULAR REEDUCATION: CPT | Performed by: PHYSICAL THERAPIST

## 2018-02-15 NOTE — PROGRESS NOTES
Name: Fela Hill  Dx: Acute pain of left shoulder (M25.512)        Authorized # of Visits:  9/12        Next MD visit: none scheduled  Fall Risk: standard         Precautions: n/a           Medication Changes since last visit?: No    Subjective: Ivania trap set and hold. - Scapular clock  -  Serratus slides at wall 10x2   - prone mid trap retraining 10x2, 3 sec hold  - Lower trap set and hold.   -  Serratus slides at wall 10x2 with assist at scapula  - diaphragmatic breathing with  Cues for rib expansion

## 2018-02-20 ENCOUNTER — OFFICE VISIT (OUTPATIENT)
Dept: PHYSICAL THERAPY | Age: 68
End: 2018-02-20
Attending: INTERNAL MEDICINE
Payer: MEDICARE

## 2018-02-20 PROCEDURE — 97140 MANUAL THERAPY 1/> REGIONS: CPT | Performed by: PHYSICAL THERAPIST

## 2018-02-20 PROCEDURE — 97112 NEUROMUSCULAR REEDUCATION: CPT | Performed by: PHYSICAL THERAPIST

## 2018-02-20 NOTE — PROGRESS NOTES
Name: Humbertomary Lennox  Dx: Acute pain of left shoulder (M25.512)        Authorized # of Visits:  10/12        Next MD visit: none scheduled  Fall Risk: standard         Precautions: n/a           Medication Changes since last visit?: No    Subjective: Kirit Lawler scapula  - diaphragmatic breathing with  Cues for rib expansion - diaphragmatic breathing (emphasis on lateral and posterior); also  Cued for rib expansion  - prone mid trap retraining 10x2, 3 sec hold  - prone T x10 each  - prone add and extension x10

## 2018-02-22 ENCOUNTER — OFFICE VISIT (OUTPATIENT)
Dept: PHYSICAL THERAPY | Age: 68
End: 2018-02-22
Attending: INTERNAL MEDICINE
Payer: MEDICARE

## 2018-02-22 PROCEDURE — 97140 MANUAL THERAPY 1/> REGIONS: CPT | Performed by: PHYSICAL THERAPIST

## 2018-02-22 PROCEDURE — 97110 THERAPEUTIC EXERCISES: CPT | Performed by: PHYSICAL THERAPIST

## 2018-02-22 PROCEDURE — 97112 NEUROMUSCULAR REEDUCATION: CPT | Performed by: PHYSICAL THERAPIST

## 2018-02-22 NOTE — PROGRESS NOTES
Name: Rishi Salazar  Dx: Acute pain of left shoulder (M25.512)        Authorized # of Visits:  11/12        Next MD visit: none scheduled  Fall Risk: standard         Precautions: n/a           Medication Changes since last visit?: No    Subjective: Nimco Wall completed today    Manual Therapy - MFR pectorals, clearing clavicle, subscapularis, lat insertion  - Scapular mobilization in sidelying  - Scapular upward rotation facilitation - Prone thoracic mobilization  - Upper thoracic gapping  - Scapular mobilizati increasing rib expansion, emphasizing expansion of ribs laterally and posteriorly with respiration; practiced diaphragmatic breathing with verbal and tactile cues to facilitate this. Goals:   Long Term Goals Timeframe (6 weeks, 4 sessions)  1.  Ghada was discussed and agreed upon by: patient   Patient was advised of these findings, precautions, and treatment options and has agreed to actively participate in planning and for this course of care.     Thank you for your referral and the opportunity to assi

## 2018-02-26 ENCOUNTER — OFFICE VISIT (OUTPATIENT)
Dept: PHYSICAL THERAPY | Age: 68
End: 2018-02-26
Attending: INTERNAL MEDICINE
Payer: MEDICARE

## 2018-02-26 PROCEDURE — 97140 MANUAL THERAPY 1/> REGIONS: CPT | Performed by: PHYSICAL THERAPIST

## 2018-02-26 PROCEDURE — 97112 NEUROMUSCULAR REEDUCATION: CPT | Performed by: PHYSICAL THERAPIST

## 2018-02-26 PROCEDURE — 97530 THERAPEUTIC ACTIVITIES: CPT | Performed by: PHYSICAL THERAPIST

## 2018-02-27 ENCOUNTER — APPOINTMENT (OUTPATIENT)
Dept: PHYSICAL THERAPY | Age: 68
End: 2018-02-27
Attending: INTERNAL MEDICINE
Payer: MEDICARE

## 2018-03-01 ENCOUNTER — OFFICE VISIT (OUTPATIENT)
Dept: PHYSICAL THERAPY | Age: 68
End: 2018-03-01
Attending: INTERNAL MEDICINE
Payer: MEDICARE

## 2018-03-01 PROCEDURE — 97110 THERAPEUTIC EXERCISES: CPT | Performed by: PHYSICAL THERAPIST

## 2018-03-01 PROCEDURE — 97112 NEUROMUSCULAR REEDUCATION: CPT | Performed by: PHYSICAL THERAPIST

## 2018-03-01 PROCEDURE — 97140 MANUAL THERAPY 1/> REGIONS: CPT | Performed by: PHYSICAL THERAPIST

## 2018-03-01 NOTE — PROGRESS NOTES
PHYSICAL THERAPY DISCHARGE REPORT  Name: Shiva Carrasco  Dx: Acute pain of left shoulder (M25.512)        Authorized # of Visits:  13/13        Next MD visit: none scheduled  Fall Risk: standard         Precautions: n/a           Medication Changes since bilaterally  - Upper thoracic gapping -Prone thoracic mobilization (PA)  - Upper thoracic gapping  - MFR pectorals  - Manual stretching pectorals - MFR pectorals, diaphragm  -Prone thoracic mobilization (PA)  - Upper thoracic gapping  - MET ribs - MFR pect above shoulder level. (MET)  3. Patient will increase functional strength of upper extremities to allow patient to work her morning shift without symptoms. (MET)  4.  Patient to improve FOTO outcomes score to less than or equal to 25% impairment, for functi

## 2018-03-06 RX ORDER — METOPROLOL TARTRATE 50 MG/1
TABLET, FILM COATED ORAL
Qty: 60 TABLET | Refills: 3 | Status: SHIPPED | OUTPATIENT
Start: 2018-03-06 | End: 2018-03-20

## 2018-03-20 ENCOUNTER — TELEPHONE (OUTPATIENT)
Dept: INTERNAL MEDICINE CLINIC | Facility: CLINIC | Age: 68
End: 2018-03-20

## 2018-03-20 RX ORDER — METOPROLOL TARTRATE 50 MG/1
50 TABLET, FILM COATED ORAL 2 TIMES DAILY
Qty: 180 TABLET | Refills: 0 | Status: SHIPPED | OUTPATIENT
Start: 2018-03-20 | End: 2018-06-20

## 2018-04-02 ENCOUNTER — TELEPHONE (OUTPATIENT)
Dept: INTERNAL MEDICINE CLINIC | Facility: CLINIC | Age: 68
End: 2018-04-02

## 2018-04-02 RX ORDER — AZITHROMYCIN 250 MG/1
TABLET, FILM COATED ORAL
Qty: 6 TABLET | Refills: 0 | Status: SHIPPED | OUTPATIENT
Start: 2018-04-02 | End: 2018-04-02

## 2018-04-02 RX ORDER — AZITHROMYCIN 250 MG/1
TABLET, FILM COATED ORAL
Qty: 6 TABLET | Refills: 0 | Status: SHIPPED | OUTPATIENT
Start: 2018-04-02 | End: 2018-04-07

## 2018-04-02 NOTE — TELEPHONE ENCOUNTER
Patient called c/o sick since Saturday 3/31, has chills, body aches, sinus congestion, cough, no fever no sore throat. No shortness of breath  Not sure if flu or sinus infection.   Offered patient appt today refusing said could come tomorrow, just not feeli

## 2018-04-02 NOTE — TELEPHONE ENCOUNTER
Spoke with patient, informed medication was sent to pharmacy and if not better to call office back. Patient verbalized understanding. Requested for medication sent to [facility] in [place] instead of [facility]. Medication resent.

## 2018-04-02 NOTE — TELEPHONE ENCOUNTER
Dr. Adalid Casas referred patient to see Oncology for Meningioma and they just wants to know if she did she follow up and if you can fax over a consults note at [phone]

## 2018-06-01 ENCOUNTER — APPOINTMENT (OUTPATIENT)
Dept: RADIATION ONCOLOGY | Facility: HOSPITAL | Age: 68
End: 2018-06-01
Attending: RADIOLOGY
Payer: MEDICARE

## 2018-06-08 RX ORDER — TRIAMTERENE AND HYDROCHLOROTHIAZIDE 37.5; 25 MG/1; MG/1
TABLET ORAL
Qty: 90 TABLET | Refills: 1 | Status: SHIPPED | OUTPATIENT
Start: 2018-06-08 | End: 2018-06-20

## 2018-06-20 ENCOUNTER — OFFICE VISIT (OUTPATIENT)
Dept: INTERNAL MEDICINE CLINIC | Facility: CLINIC | Age: 68
End: 2018-06-20

## 2018-06-20 ENCOUNTER — TELEPHONE (OUTPATIENT)
Dept: INTERNAL MEDICINE CLINIC | Facility: CLINIC | Age: 68
End: 2018-06-20

## 2018-06-20 VITALS
SYSTOLIC BLOOD PRESSURE: 134 MMHG | WEIGHT: 192 LBS | BODY MASS INDEX: 29.1 KG/M2 | HEIGHT: 68 IN | OXYGEN SATURATION: 98 % | HEART RATE: 70 BPM | DIASTOLIC BLOOD PRESSURE: 76 MMHG | TEMPERATURE: 98 F

## 2018-06-20 DIAGNOSIS — J43.9 PULMONARY EMPHYSEMA, UNSPECIFIED EMPHYSEMA TYPE (HCC): ICD-10-CM

## 2018-06-20 DIAGNOSIS — D32.9 MENINGIOMA (HCC): ICD-10-CM

## 2018-06-20 DIAGNOSIS — I10 HYPERTENSION, BENIGN: Primary | ICD-10-CM

## 2018-06-20 PROCEDURE — 99214 OFFICE O/P EST MOD 30 MIN: CPT | Performed by: INTERNAL MEDICINE

## 2018-06-20 PROCEDURE — G0463 HOSPITAL OUTPT CLINIC VISIT: HCPCS | Performed by: INTERNAL MEDICINE

## 2018-06-20 RX ORDER — TRIAMTERENE AND HYDROCHLOROTHIAZIDE 37.5; 25 MG/1; MG/1
1 TABLET ORAL
Qty: 90 TABLET | Refills: 1 | Status: SHIPPED | OUTPATIENT
Start: 2018-06-20 | End: 2018-12-12

## 2018-06-20 RX ORDER — METOPROLOL TARTRATE 50 MG/1
50 TABLET, FILM COATED ORAL 2 TIMES DAILY
Qty: 180 TABLET | Refills: 1 | Status: SHIPPED | OUTPATIENT
Start: 2018-06-20 | End: 2018-12-12

## 2018-06-20 RX ORDER — BUDESONIDE AND FORMOTEROL FUMARATE DIHYDRATE 160; 4.5 UG/1; UG/1
2 AEROSOL RESPIRATORY (INHALATION) 2 TIMES DAILY
Qty: 10.2 INHALER | Refills: 6 | Status: SHIPPED | OUTPATIENT
Start: 2018-06-20 | End: 2018-12-12

## 2018-06-20 RX ORDER — ALBUTEROL SULFATE 90 UG/1
2 AEROSOL, METERED RESPIRATORY (INHALATION) EVERY 6 HOURS PRN
Qty: 1 INHALER | Refills: 3 | Status: SHIPPED | OUTPATIENT
Start: 2018-06-20 | End: 2019-08-07

## 2018-06-20 NOTE — PATIENT INSTRUCTIONS
ASSESSMENT/PLAN:   Hypertension, benign  BP overall well controlled, encouraged her to continue same meds and regular exercise    Chronic airway obstruction ([address]  Patient with persistent cough, should have CXR.  Recommend she continue current meds, needs

## 2018-06-20 NOTE — TELEPHONE ENCOUNTER
Patient is asking to see if you can try to get her an appointment to see Dr.Andy Prince for [facility] ([facility] need clinical information )

## 2018-06-20 NOTE — ASSESSMENT & PLAN NOTE
Patient recommended to see Radiation Oncology for her meningioma for possible CyberKnife treatment.  Will refer to Dr Dustin Simpson

## 2018-06-20 NOTE — PROGRESS NOTES
HPI:    Patient ID: Brenda Smith is a 74year old female. HPI  Hypertension  Patient is here for follow up of hypertension. BP at home: not checking  Has been compliant with medications.   Exercise level: exercises 3 times a  week and has been follow the lungs 2 (two) times daily. Disp: 10.2 Inhaler Rfl: 6   Albuterol Sulfate  (90 Base) MCG/ACT Inhalation Aero Soln Inhale 2 puffs into the lungs every 6 (six) hours as needed for Wheezing.  ALBUTEROL SULFATE Ventolin Inhaler  Diagnosis J44.9 Disp: to try decreasing her smoking. Meningioma [facility])  Patient recommended to see Radiation Oncology for her meningioma for possible CyberKnife treatment.  Will refer to Dr Pawan Farley

## 2018-06-20 NOTE — ASSESSMENT & PLAN NOTE
Patient with persistent cough, should have CXR. Recommend she continue current meds, needs to continue to try decreasing her smoking.

## 2018-06-20 NOTE — TELEPHONE ENCOUNTER
Was able to get ahold of the [facility] office at [place] and spoke with Dhiraj Amaya at length regarding setting up an appointment for Concepcion Daniel. If Concepcion Daniel calls back please tell her to call the following:     Karsten Gurrola.   She is in charge of all s

## 2018-06-27 ENCOUNTER — HOSPITAL ENCOUNTER (OUTPATIENT)
Dept: GENERAL RADIOLOGY | Age: 68
Discharge: HOME | End: 2018-06-27
Attending: INTERNAL MEDICINE
Payer: MEDICARE

## 2018-06-27 DIAGNOSIS — J43.9 PULMONARY EMPHYSEMA, UNSPECIFIED EMPHYSEMA TYPE (HCC): ICD-10-CM

## 2018-06-27 PROCEDURE — 71046 X-RAY EXAM CHEST 2 VIEWS: CPT | Performed by: INTERNAL MEDICINE

## 2018-06-28 ENCOUNTER — OFFICE VISIT (OUTPATIENT)
Dept: RADIATION ONCOLOGY | Facility: HOSPITAL | Age: 68
End: 2018-06-28
Attending: RADIOLOGY
Payer: MEDICARE

## 2018-06-28 VITALS
OXYGEN SATURATION: 96 % | WEIGHT: 193.19 LBS | RESPIRATION RATE: 16 BRPM | HEART RATE: 78 BPM | SYSTOLIC BLOOD PRESSURE: 154 MMHG | DIASTOLIC BLOOD PRESSURE: 83 MMHG | TEMPERATURE: 98 F | BODY MASS INDEX: 29 KG/M2

## 2018-06-28 DIAGNOSIS — D32.9 MENINGIOMA (HCC): Primary | ICD-10-CM

## 2018-06-28 PROCEDURE — 99212 OFFICE O/P EST SF 10 MIN: CPT

## 2018-06-28 RX ORDER — LORAZEPAM 0.5 MG/1
0.5 TABLET ORAL AS NEEDED
Qty: 5 TABLET | Refills: 0 | Status: SHIPPED | OUTPATIENT
Start: 2018-06-28 | End: 2019-08-07 | Stop reason: ALTCHOICE

## 2018-06-28 NOTE — PROGRESS NOTES
Nursing Consultation Note  Patient: Nick Coleman  YOB: 1950  Age: 74year old  Radiation Oncologist: Dr. Avila Ochoa  Referring Physician: No ref. provider found  Diagnosis:No diagnosis found.   Consult Date: 6/28/2018      Chemotherapy: puffs into the lungs every 6 (six) hours as needed for Wheezing. ALBUTEROL SULFATE Ventolin Inhaler  Diagnosis J44.9 Disp: 1 Inhaler Rfl: 3   Metoprolol Tartrate 50 MG Oral Tab Take 1 tablet (50 mg total) by mouth 2 (two) times daily.  Disp: 180 tablet Rfl: defibrillator No    Reaction to local anesthetic No     Social History Narrative   None on file       ECOG:  Grade 0 - Fully active, able to carry on all predisease activities without restrictions. Education:  yes    Are ADL's met?   Yes  Does patient meningioma might grow. CK information provided and explained. Radiation process explained.    I explained to the patient that today she would meet Dr. Km Fiore but while being treated there was a possibility that she might also encounter the physician who covers

## 2018-06-28 NOTE — CONSULTS
RADIATION ONCOLOGY NOTE    DATE OF VISIT: 6/28/2018    DIAGNOSIS :  Right frontal meningioma, 1.8cm, for repeat MRI shortly. Dear Cosme Macedo and colleagues,    Thank you for asking us to see Ms. Elder.   As you recall, she presented back in O lungs 2 (two) times daily. Disp: 10.2 Inhaler Rfl: 6   Albuterol Sulfate  (90 Base) MCG/ACT Inhalation Aero Soln Inhale 2 puffs into the lungs every 6 (six) hours as needed for Wheezing.  ALBUTEROL SULFATE Ventolin Inhaler  Diagnosis J44.9 Disp: 1 In grandmother; Hypertension in her mother. PHYSICAL EXAM  Blood pressure 154/83, pulse 78, temperature 98.2 °F (36.8 °C), temperature source Oral, resp. rate 16, weight 87.6 kg (193 lb 3.2 oz), last menstrual period 11/03/2005, SpO2 96 %.   PERFORMANCE STA MD  Radiation Oncology  [email or web address]  [phone].    6/28/2018    ==  Show images for MRI BRAIN (W+WO) (CPT=70553)    Printable Result Report   Open Hard Copy Result Report (Order #[id number] - MRI BRAIN (W+WO) (CPT=70553))      Study Result   PROCEDURE: structures. There is no diffusion restriction to suggest acute or subacute ischemia. No suspicious   supratentorial parenchymal enhancement is identified. CEREBELLUM: No edema, hemorrhage, mass, or acute infarction is seen.  There is cerebellar folial expa

## 2018-07-01 ENCOUNTER — APPOINTMENT (OUTPATIENT)
Dept: RADIATION ONCOLOGY | Facility: HOSPITAL | Age: 68
End: 2018-07-01
Attending: RADIOLOGY
Payer: MEDICARE

## 2018-07-10 ENCOUNTER — HOSPITAL ENCOUNTER (OUTPATIENT)
Dept: MRI IMAGING | Facility: HOSPITAL | Age: 68
Discharge: HOME | End: 2018-07-10
Attending: RADIOLOGY
Payer: MEDICARE

## 2018-07-10 DIAGNOSIS — D32.9 MENINGIOMA (HCC): ICD-10-CM

## 2018-07-10 LAB — CREAT BLD-MCNC: 1.2 MG/DL (ref 0.5–1.5)

## 2018-07-10 PROCEDURE — A9576 INJ PROHANCE MULTIPACK: HCPCS | Performed by: RADIOLOGY

## 2018-07-10 PROCEDURE — 82565 ASSAY OF CREATININE: CPT

## 2018-07-10 PROCEDURE — 70553 MRI BRAIN STEM W/O & W/DYE: CPT | Performed by: RADIOLOGY

## 2018-07-11 DIAGNOSIS — D32.9 MENINGIOMA (HCC): Primary | ICD-10-CM

## 2018-11-28 ENCOUNTER — TELEPHONE (OUTPATIENT)
Dept: INTERNAL MEDICINE CLINIC | Facility: CLINIC | Age: 68
End: 2018-11-28

## 2018-11-28 NOTE — TELEPHONE ENCOUNTER
Patient called requesting to speak to [facility] [place], [place] - SHAHAB DUMONT

## 2018-12-12 ENCOUNTER — OFFICE VISIT (OUTPATIENT)
Dept: INTERNAL MEDICINE CLINIC | Facility: CLINIC | Age: 68
End: 2018-12-12
Payer: MEDICARE

## 2018-12-12 VITALS
SYSTOLIC BLOOD PRESSURE: 138 MMHG | TEMPERATURE: 99 F | HEART RATE: 70 BPM | WEIGHT: 192 LBS | DIASTOLIC BLOOD PRESSURE: 81 MMHG | HEIGHT: 68 IN | RESPIRATION RATE: 18 BRPM | BODY MASS INDEX: 29.1 KG/M2

## 2018-12-12 DIAGNOSIS — Z12.39 SCREENING FOR BREAST CANCER: Primary | ICD-10-CM

## 2018-12-12 DIAGNOSIS — I10 HTN (HYPERTENSION), BENIGN: ICD-10-CM

## 2018-12-12 PROCEDURE — 99214 OFFICE O/P EST MOD 30 MIN: CPT | Performed by: INTERNAL MEDICINE

## 2018-12-12 RX ORDER — ALBUTEROL SULFATE 90 UG/1
2 AEROSOL, METERED RESPIRATORY (INHALATION) EVERY 6 HOURS PRN
Qty: 2 INHALER | Refills: 3 | Status: SHIPPED | OUTPATIENT
Start: 2018-12-12 | End: 2019-11-06

## 2018-12-12 RX ORDER — TRIAMTERENE AND HYDROCHLOROTHIAZIDE 37.5; 25 MG/1; MG/1
1 TABLET ORAL
Qty: 90 TABLET | Refills: 1 | Status: SHIPPED | OUTPATIENT
Start: 2018-12-12 | End: 2019-06-10

## 2018-12-12 RX ORDER — METOPROLOL TARTRATE 50 MG/1
50 TABLET, FILM COATED ORAL 2 TIMES DAILY
Qty: 180 TABLET | Refills: 1 | Status: SHIPPED | OUTPATIENT
Start: 2018-12-12 | End: 2019-06-10

## 2018-12-12 RX ORDER — BUDESONIDE AND FORMOTEROL FUMARATE DIHYDRATE 160; 4.5 UG/1; UG/1
2 AEROSOL RESPIRATORY (INHALATION) 2 TIMES DAILY
Qty: 3 INHALER | Refills: 3 | Status: SHIPPED | OUTPATIENT
Start: 2018-12-12 | End: 2019-11-06

## 2018-12-12 RX ORDER — ALBUTEROL SULFATE 2.5 MG/3ML
2.5 SOLUTION RESPIRATORY (INHALATION) EVERY 4 HOURS PRN
Qty: 30 AMPULE | Refills: 0 | Status: CANCELLED | OUTPATIENT
Start: 2018-12-12

## 2018-12-12 NOTE — PROGRESS NOTES
HPI:    Patient ID: Fernando Esparza is a 79year old female. HPI  She came in to establish care with new physician   right ankle pain-she was seen by the podiatrist and they referred her to see cardiologist to rule out peripheral vascular disease.   Acc for arthralgias, back pain, gait problem, joint swelling, myalgias, neck pain and neck stiffness. Ankle pain   Allergic/Immunologic: Negative.     Neurological: Negative for dizziness, tremors, speech difficulty, weakness, light-headedness, numbness prior to MRI).  Disp: 5 tablet Rfl: 0     Allergies:  Amoxicillin                 Comment:Other reaction(s): GI upset  Meloxicam                 Varenicline                 Comment:Other reaction(s): mood swings    HISTORY:  Past Medical History:   Katarina Mckeon oropharyngeal edema or posterior oropharyngeal erythema. Eyes: Conjunctivae and EOM are normal. Pupils are equal, round, and reactive to light. Right eye exhibits no discharge. Left eye exhibits no discharge. No scleral icterus.    Neck: Normal range of m Vitals reviewed.            ASSESSMENT/PLAN:   Screening for breast cancer  (primary encounter diagnosis) is due for mammogram referral given    htn- controlled advised to continue with current medication check blood pressure at home or any local pharmacy

## 2018-12-12 NOTE — PATIENT INSTRUCTIONS
Screening for breast cancer  (primary encounter diagnosis) is due for mammogram referral given    htn- controlled advised to continue with current medication check blood pressure at home or any local pharmacy and bring logbook next visit advised to watch h

## 2019-01-22 ENCOUNTER — HOSPITAL ENCOUNTER (OUTPATIENT)
Dept: MRI IMAGING | Facility: HOSPITAL | Age: 69
Discharge: HOME | End: 2019-01-22
Attending: RADIOLOGY
Payer: MEDICARE

## 2019-01-22 DIAGNOSIS — D32.9 MENINGIOMA (HCC): ICD-10-CM

## 2019-01-22 LAB — CREAT BLD-MCNC: 1.2 MG/DL (ref 0.5–1.5)

## 2019-01-22 PROCEDURE — A9575 INJ GADOTERATE MEGLUMI 0.1ML: HCPCS | Performed by: RADIOLOGY

## 2019-01-22 PROCEDURE — 70553 MRI BRAIN STEM W/O & W/DYE: CPT | Performed by: RADIOLOGY

## 2019-01-22 PROCEDURE — 82565 ASSAY OF CREATININE: CPT

## 2019-01-23 ENCOUNTER — TELEPHONE (OUTPATIENT)
Dept: RADIATION ONCOLOGY | Facility: HOSPITAL | Age: 69
End: 2019-01-23

## 2019-01-23 NOTE — TELEPHONE ENCOUNTER
Called pt per Dr Samantha Rabago to inform MRI is stable and to repeat in 1 year. Left CK's direct phone # for any questions or concerns.  Pt placed on CK waiting list.

## 2019-01-29 ENCOUNTER — HOSPITAL ENCOUNTER (OUTPATIENT)
Dept: MAMMOGRAPHY | Age: 69
Discharge: HOME | End: 2019-01-29
Attending: INTERNAL MEDICINE
Payer: MEDICARE

## 2019-01-29 DIAGNOSIS — Z12.39 SCREENING FOR BREAST CANCER: ICD-10-CM

## 2019-01-29 PROCEDURE — 77067 SCR MAMMO BI INCL CAD: CPT | Performed by: INTERNAL MEDICINE

## 2019-01-29 PROCEDURE — 77063 BREAST TOMOSYNTHESIS BI: CPT | Performed by: INTERNAL MEDICINE

## 2019-02-02 ENCOUNTER — TELEPHONE (OUTPATIENT)
Dept: INTERNAL MEDICINE CLINIC | Facility: CLINIC | Age: 69
End: 2019-02-02

## 2019-02-02 NOTE — TELEPHONE ENCOUNTER
Patient called because she read her results on line for her mammogram and wanted to know next steps.   She was advised that the imaging department would call her to schedule additional views but she was also given central scheduling number if she does not h

## 2019-02-13 ENCOUNTER — HOSPITAL ENCOUNTER (OUTPATIENT)
Dept: ULTRASOUND IMAGING | Facility: HOSPITAL | Age: 69
Discharge: HOME | End: 2019-02-13
Attending: INTERNAL MEDICINE
Payer: MEDICARE

## 2019-02-13 ENCOUNTER — HOSPITAL ENCOUNTER (OUTPATIENT)
Dept: MAMMOGRAPHY | Facility: HOSPITAL | Age: 69
Discharge: HOME | End: 2019-02-13
Attending: INTERNAL MEDICINE
Payer: MEDICARE

## 2019-02-13 DIAGNOSIS — R92.8 ABNORMAL MAMMOGRAM: ICD-10-CM

## 2019-02-13 PROCEDURE — 76642 ULTRASOUND BREAST LIMITED: CPT | Performed by: INTERNAL MEDICINE

## 2019-02-13 PROCEDURE — 77062 BREAST TOMOSYNTHESIS BI: CPT | Performed by: INTERNAL MEDICINE

## 2019-02-13 PROCEDURE — 77066 DX MAMMO INCL CAD BI: CPT | Performed by: INTERNAL MEDICINE

## 2019-06-05 ENCOUNTER — APPOINTMENT (OUTPATIENT)
Dept: ULTRASOUND IMAGING | Facility: HOSPITAL | Age: 69
End: 2019-06-05
Attending: EMERGENCY MEDICINE
Payer: MEDICARE

## 2019-06-05 ENCOUNTER — APPOINTMENT (OUTPATIENT)
Dept: GENERAL RADIOLOGY | Facility: HOSPITAL | Age: 69
End: 2019-06-05
Attending: EMERGENCY MEDICINE
Payer: MEDICARE

## 2019-06-05 ENCOUNTER — HOSPITAL ENCOUNTER (EMERGENCY)
Facility: HOSPITAL | Age: 69
Discharge: HOME | End: 2019-06-05
Attending: EMERGENCY MEDICINE
Payer: MEDICARE

## 2019-06-05 VITALS
RESPIRATION RATE: 20 BRPM | HEIGHT: 68 IN | SYSTOLIC BLOOD PRESSURE: 134 MMHG | TEMPERATURE: 98 F | HEART RATE: 84 BPM | WEIGHT: 190 LBS | BODY MASS INDEX: 28.79 KG/M2 | DIASTOLIC BLOOD PRESSURE: 72 MMHG | OXYGEN SATURATION: 94 %

## 2019-06-05 DIAGNOSIS — J44.1 COPD EXACERBATION (HCC): Primary | ICD-10-CM

## 2019-06-05 DIAGNOSIS — R60.0 EDEMA OF RIGHT LOWER EXTREMITY: ICD-10-CM

## 2019-06-05 PROCEDURE — 80048 BASIC METABOLIC PNL TOTAL CA: CPT | Performed by: EMERGENCY MEDICINE

## 2019-06-05 PROCEDURE — 94640 AIRWAY INHALATION TREATMENT: CPT

## 2019-06-05 PROCEDURE — 96374 THER/PROPH/DIAG INJ IV PUSH: CPT

## 2019-06-05 PROCEDURE — 85025 COMPLETE CBC W/AUTO DIFF WBC: CPT | Performed by: EMERGENCY MEDICINE

## 2019-06-05 PROCEDURE — 93971 EXTREMITY STUDY: CPT | Performed by: EMERGENCY MEDICINE

## 2019-06-05 PROCEDURE — 99285 EMERGENCY DEPT VISIT HI MDM: CPT

## 2019-06-05 PROCEDURE — 71046 X-RAY EXAM CHEST 2 VIEWS: CPT | Performed by: EMERGENCY MEDICINE

## 2019-06-05 RX ORDER — ALBUTEROL SULFATE 2.5 MG/3ML
2.5 SOLUTION RESPIRATORY (INHALATION) EVERY 4 HOURS PRN
Qty: 30 AMPULE | Refills: 0 | Status: SHIPPED | OUTPATIENT
Start: 2019-06-05 | End: 2019-06-10

## 2019-06-05 RX ORDER — PREDNISONE 20 MG/1
40 TABLET ORAL DAILY
Qty: 8 TABLET | Refills: 0 | Status: SHIPPED | OUTPATIENT
Start: 2019-06-05 | End: 2019-06-09

## 2019-06-05 RX ORDER — IPRATROPIUM BROMIDE AND ALBUTEROL SULFATE 2.5; .5 MG/3ML; MG/3ML
3 SOLUTION RESPIRATORY (INHALATION) ONCE
Status: COMPLETED | OUTPATIENT
Start: 2019-06-05 | End: 2019-06-05

## 2019-06-05 RX ORDER — ALBUTEROL SULFATE 2.5 MG/3ML
2.5 SOLUTION RESPIRATORY (INHALATION) ONCE
Status: COMPLETED | OUTPATIENT
Start: 2019-06-05 | End: 2019-06-05

## 2019-06-05 RX ORDER — METHYLPREDNISOLONE SODIUM SUCCINATE 125 MG/2ML
125 INJECTION, POWDER, LYOPHILIZED, FOR SOLUTION INTRAMUSCULAR; INTRAVENOUS ONCE
Status: COMPLETED | OUTPATIENT
Start: 2019-06-05 | End: 2019-06-05

## 2019-06-05 NOTE — ED NOTES
Storm Fam states some relief in dyspnea following 1st neb. She has rhonchorous breath sounds bilaterally. She also c/o RLE swelling. Distal CMS is intact. US ordered by Dr. Naima Roberto. Will continue to monitor.

## 2019-06-05 NOTE — ED PROVIDER NOTES
Patient Seen in: [facility] Emergency Department    History   Patient presents with:  Dyspnea VELIA SOB (respiratory)    Stated Complaint:     HPI    Patient presents with cough congestion difficulty breathing for the past 3 days.   She has history o 6 (six) hours as needed for Wheezing.    Tiotropium Bromide Monohydrate (SPIRIVA HANDIHALER) 18 MCG Inhalation Cap,  INHALE THE CONTENTS OF 1 CAPSULE VIA HANDIHALER DAILY   Albuterol Sulfate  (90 Base) MCG/ACT Inhalation Aero Soln,  Inhale 2 puffs in right there is 1-2+ edema lower extremity with some subtle dermatic changes to the lower part. She reports she had recent vein procedure done.   Respiratory:   Diminished breath sounds more to the bases with inspiratory and expiratory wheezes are noted whi panel order CBC WITH DIFFERENTIAL WITH PLATELET.   Procedure                               Abnormality         Status                     ---------                               -----------         ------                     CBC W/ DIFFERENTIAL[[id number]]

## 2019-06-10 ENCOUNTER — OFFICE VISIT (OUTPATIENT)
Dept: INTERNAL MEDICINE CLINIC | Facility: CLINIC | Age: 69
End: 2019-06-10
Payer: MEDICARE

## 2019-06-10 VITALS
RESPIRATION RATE: 18 BRPM | DIASTOLIC BLOOD PRESSURE: 85 MMHG | OXYGEN SATURATION: 98 % | HEART RATE: 69 BPM | TEMPERATURE: 99 F | SYSTOLIC BLOOD PRESSURE: 138 MMHG | BODY MASS INDEX: 29 KG/M2 | WEIGHT: 193 LBS

## 2019-06-10 DIAGNOSIS — L02.413 CUTANEOUS ABSCESS OF RIGHT UPPER EXTREMITY: Primary | ICD-10-CM

## 2019-06-10 PROCEDURE — G0463 HOSPITAL OUTPT CLINIC VISIT: HCPCS | Performed by: INTERNAL MEDICINE

## 2019-06-10 PROCEDURE — 99214 OFFICE O/P EST MOD 30 MIN: CPT | Performed by: INTERNAL MEDICINE

## 2019-06-10 RX ORDER — TRIAMTERENE AND HYDROCHLOROTHIAZIDE 37.5; 25 MG/1; MG/1
1 TABLET ORAL
Qty: 90 TABLET | Refills: 1 | Status: SHIPPED | OUTPATIENT
Start: 2019-06-10 | End: 2019-11-06

## 2019-06-10 RX ORDER — ALBUTEROL SULFATE 2.5 MG/3ML
2.5 SOLUTION RESPIRATORY (INHALATION) EVERY 4 HOURS PRN
Qty: 30 AMPULE | Refills: 0 | Status: SHIPPED | OUTPATIENT
Start: 2019-06-10 | End: 2019-07-10

## 2019-06-10 RX ORDER — PREDNISONE 20 MG/1
20 TABLET ORAL 2 TIMES DAILY
Qty: 4 TABLET | Refills: 0 | Status: SHIPPED | OUTPATIENT
Start: 2019-06-10 | End: 2019-08-07 | Stop reason: ALTCHOICE

## 2019-06-10 RX ORDER — SULFAMETHOXAZOLE AND TRIMETHOPRIM 800; 160 MG/1; MG/1
1 TABLET ORAL 2 TIMES DAILY
Qty: 14 TABLET | Refills: 0 | Status: SHIPPED | OUTPATIENT
Start: 2019-06-10 | End: 2019-06-25 | Stop reason: ALTCHOICE

## 2019-06-10 RX ORDER — METOPROLOL TARTRATE 50 MG/1
50 TABLET, FILM COATED ORAL 2 TIMES DAILY
Qty: 180 TABLET | Refills: 1 | Status: SHIPPED | OUTPATIENT
Start: 2019-06-10 | End: 2019-12-26

## 2019-06-10 NOTE — PROGRESS NOTES
HPI:    Patient ID: Alena Weiss is a 79year old female. HPI   she came in today for follow-up from emergency room. She went on June 5 because of shortness of breath and cough.   They did chest x-ray which did not show any pneumonia it was COPD exa Endocrine: Negative for cold intolerance, heat intolerance, polydipsia, polyphagia and polyuria. Genitourinary: Negative for difficulty urinating, dysuria, enuresis, flank pain, frequency, hematuria and urgency.    Musculoskeletal: Negative for arthralgia Albuterol Sulfate HFA (PROVENTIL HFA) 108 (90 Base) MCG/ACT Inhalation Aero Soln Inhale 2 puffs into the lungs every 6 (six) hours as needed for Wheezing.  Disp: 2 Inhaler Rfl: 3   LORazepam 0.5 MG Oral Tab Take 1 tablet (0.5 mg total) by mouth as needed fo Types: 1 Standard drinks or equivalent per week      Comment: rarely    Drug use: No       PHYSICAL EXAM:    Physical Exam   Constitutional: She is oriented to person, place, and time. She appears well-developed and well-nourished. No distress.    HENT: Pulmonary/Chest: Effort normal and breath sounds normal. No accessory muscle usage. No respiratory distress. She has no wheezes. She has no rales. She exhibits no tenderness. Abdominal: Soft.  Bowel sounds are normal. She exhibits no shifting dullness, no Signed Prescriptions Disp Refills   • Sulfamethoxazole-TMP -160 MG Oral Tab per tablet 14 tablet 0     Sig: Take 1 tablet by mouth 2 (two) times daily.    • predniSONE 20 MG Oral Tab 4 tablet 0     Sig: Take 1 tablet (20 mg total) by mouth 2 (two) karla

## 2019-06-10 NOTE — TELEPHONE ENCOUNTER
Refill passed per [facility], [facility] protocol.   Hypertensive Medications  Protocol Criteria:  · Appointment scheduled in the past 6 months or in the next 3 months  · BMP or CMP in the past 12 months  · Creatinine result < 2  Recent Outpatient Visits

## 2019-06-11 ENCOUNTER — OFFICE VISIT (OUTPATIENT)
Dept: SURGERY | Facility: CLINIC | Age: 69
End: 2019-06-11
Payer: MEDICARE

## 2019-06-11 VITALS
DIASTOLIC BLOOD PRESSURE: 82 MMHG | HEART RATE: 74 BPM | RESPIRATION RATE: 18 BRPM | BODY MASS INDEX: 29.25 KG/M2 | WEIGHT: 193 LBS | SYSTOLIC BLOOD PRESSURE: 183 MMHG | TEMPERATURE: 98 F | HEIGHT: 68 IN

## 2019-06-11 DIAGNOSIS — L08.9 INFECTED SEBACEOUS CYST: Primary | ICD-10-CM

## 2019-06-11 DIAGNOSIS — L72.3 INFECTED SEBACEOUS CYST: Primary | ICD-10-CM

## 2019-06-11 PROCEDURE — G0463 HOSPITAL OUTPT CLINIC VISIT: HCPCS | Performed by: SURGERY

## 2019-06-11 PROCEDURE — 99204 OFFICE O/P NEW MOD 45 MIN: CPT | Performed by: SURGERY

## 2019-06-11 PROCEDURE — 10060 I&D ABSCESS SIMPLE/SINGLE: CPT | Performed by: SURGERY

## 2019-06-11 NOTE — PROCEDURES
Procedure Note      Preop:  Infected sebaceous cyst of the right arm  Postop:  Same  Procedure: Incision and drainage  Anesthesia:  Local  EBL:  Minimal  Description:  The skin was prepped and draped in sterile fashion.   The skin and subcutaneous tissue s

## 2019-06-11 NOTE — PROGRESS NOTES
HPI:    Patient ID: Fadia Negro is a 79year old female presenting with Patient presents with:  Abscess: Pt referred by Dr. Kati Ricardo regarding  right upper extremity. Pt states on Sat. she woke up and noticed tenderness and redness to area.   Pt started Stress: Not on file    Relationships      Social connections:        Talks on phone: Not on file        Gets together: Not on file        Attends [employer] service: Not on file        Active member of club or organization: Not on file        Attends me predniSONE 20 MG Oral Tab Take 1 tablet (20 mg total) by mouth 2 (two) times daily.  Disp: 4 tablet Rfl: 0   albuterol sulfate (2.5 MG/3ML) 0.083% Inhalation Nebu Soln Take 3 mL (2.5 mg total) by nebulization every 4 (four) hours as needed for Wheezing or S BP (!) 183/82   Pulse 74   Temp 98.2 °F (36.8 °C)   Resp 18   Ht 5' 8\" (1.727 m)   Wt 193 lb (87.5 kg)   LMP 11/03/2005   BMI 29.35 kg/m²   Physical Exam   Vitals reviewed. Constitutional: She is oriented to person, place, and time.  Vital signs are norm

## 2019-06-12 ENCOUNTER — TELEPHONE (OUTPATIENT)
Dept: SURGERY | Facility: CLINIC | Age: 69
End: 2019-06-12

## 2019-06-12 ENCOUNTER — NURSE ONLY (OUTPATIENT)
Dept: SURGERY | Facility: CLINIC | Age: 69
End: 2019-06-12
Payer: MEDICARE

## 2019-06-12 DIAGNOSIS — L72.3 INFECTED SEBACEOUS CYST: Primary | ICD-10-CM

## 2019-06-12 DIAGNOSIS — L08.9 INFECTED SEBACEOUS CYST: Primary | ICD-10-CM

## 2019-06-12 PROCEDURE — G0463 HOSPITAL OUTPT CLINIC VISIT: HCPCS | Performed by: SURGERY

## 2019-06-12 PROCEDURE — A6216 NON-STERILE GAUZE<=16 SQ IN: HCPCS | Performed by: SURGERY

## 2019-06-12 NOTE — TELEPHONE ENCOUNTER
Pt had abscess drained yesterday, states she was told she is supposed to stuff incision with dressing but she is unable to, requesting to speak to RN. Pls call thank you.

## 2019-06-25 ENCOUNTER — OFFICE VISIT (OUTPATIENT)
Dept: SURGERY | Facility: CLINIC | Age: 69
End: 2019-06-25
Payer: MEDICARE

## 2019-06-25 VITALS — BODY MASS INDEX: 29 KG/M2 | WEIGHT: 193 LBS

## 2019-06-25 DIAGNOSIS — L72.3 SEBACEOUS CYST: Primary | ICD-10-CM

## 2019-06-25 PROCEDURE — 99212 OFFICE O/P EST SF 10 MIN: CPT | Performed by: SURGERY

## 2019-06-25 PROCEDURE — G0463 HOSPITAL OUTPT CLINIC VISIT: HCPCS | Performed by: SURGERY

## 2019-06-26 NOTE — PROGRESS NOTES
Patient presents with:  Cyst: S/P I & D of infected sebaceous cyst 6/11/2019. Site appears to be healing, no redness, swelling or odor noted. Some yellow/brown draniage noted. Patient states it is not as sore, and they are still packing the wound daily.

## 2019-07-02 ENCOUNTER — TELEPHONE (OUTPATIENT)
Dept: SURGERY | Facility: CLINIC | Age: 69
End: 2019-07-02

## 2019-07-02 DIAGNOSIS — L72.3 SEBACEOUS CYST: Primary | ICD-10-CM

## 2019-07-02 NOTE — TELEPHONE ENCOUNTER
Pt. would like to resched her surgery sched for 8/8/19. Pt. would prefer to sched for 8/12/19 prefers a Monday appt.

## 2019-08-12 ENCOUNTER — HOSPITAL ENCOUNTER (OUTPATIENT)
Facility: HOSPITAL | Age: 69
Setting detail: HOSPITAL OUTPATIENT SURGERY
Discharge: HOME | End: 2019-08-12
Attending: SURGERY | Admitting: SURGERY
Payer: MEDICARE

## 2019-08-12 ENCOUNTER — ANESTHESIA (OUTPATIENT)
Dept: SURGERY | Facility: HOSPITAL | Age: 69
End: 2019-08-12
Payer: MEDICARE

## 2019-08-12 ENCOUNTER — ANESTHESIA EVENT (OUTPATIENT)
Dept: SURGERY | Facility: HOSPITAL | Age: 69
End: 2019-08-12
Payer: MEDICARE

## 2019-08-12 VITALS
BODY MASS INDEX: 28.49 KG/M2 | RESPIRATION RATE: 15 BRPM | WEIGHT: 188 LBS | HEART RATE: 65 BPM | OXYGEN SATURATION: 98 % | HEIGHT: 68 IN | DIASTOLIC BLOOD PRESSURE: 78 MMHG | TEMPERATURE: 98 F | SYSTOLIC BLOOD PRESSURE: 135 MMHG

## 2019-08-12 DIAGNOSIS — L72.3 SEBACEOUS CYST: ICD-10-CM

## 2019-08-12 PROCEDURE — 11404 EXC TR-EXT B9+MARG 3.1-4 CM: CPT | Performed by: SURGERY

## 2019-08-12 PROCEDURE — 0XB60ZZ EXCISION OF RIGHT UPPER EXTREMITY, OPEN APPROACH: ICD-10-PCS | Performed by: SURGERY

## 2019-08-12 RX ORDER — MORPHINE SULFATE 10 MG/ML
6 INJECTION, SOLUTION INTRAMUSCULAR; INTRAVENOUS EVERY 10 MIN PRN
Status: DISCONTINUED | OUTPATIENT
Start: 2019-08-12 | End: 2019-08-12

## 2019-08-12 RX ORDER — POLYETHYLENE GLYCOL 3350 17 G/17G
17 POWDER, FOR SOLUTION ORAL DAILY
Qty: 14 PACKET | Refills: 0 | Status: SHIPPED | OUTPATIENT
Start: 2019-08-12 | End: 2019-08-26

## 2019-08-12 RX ORDER — MORPHINE SULFATE 4 MG/ML
4 INJECTION, SOLUTION INTRAMUSCULAR; INTRAVENOUS EVERY 10 MIN PRN
Status: DISCONTINUED | OUTPATIENT
Start: 2019-08-12 | End: 2019-08-12

## 2019-08-12 RX ORDER — BUPIVACAINE HYDROCHLORIDE AND EPINEPHRINE 5; 5 MG/ML; UG/ML
INJECTION, SOLUTION PERINEURAL AS NEEDED
Status: DISCONTINUED | OUTPATIENT
Start: 2019-08-12 | End: 2019-08-12 | Stop reason: HOSPADM

## 2019-08-12 RX ORDER — HYDROCODONE BITARTRATE AND ACETAMINOPHEN 5; 325 MG/1; MG/1
2 TABLET ORAL AS NEEDED
Status: DISCONTINUED | OUTPATIENT
Start: 2019-08-12 | End: 2019-08-12

## 2019-08-12 RX ORDER — CEFAZOLIN SODIUM/WATER 2 G/20 ML
2 SYRINGE (ML) INTRAVENOUS ONCE
Status: COMPLETED | OUTPATIENT
Start: 2019-08-12 | End: 2019-08-12

## 2019-08-12 RX ORDER — ONDANSETRON 2 MG/ML
4 INJECTION INTRAMUSCULAR; INTRAVENOUS ONCE AS NEEDED
Status: DISCONTINUED | OUTPATIENT
Start: 2019-08-12 | End: 2019-08-12

## 2019-08-12 RX ORDER — HYDROMORPHONE HYDROCHLORIDE 1 MG/ML
0.6 INJECTION, SOLUTION INTRAMUSCULAR; INTRAVENOUS; SUBCUTANEOUS EVERY 5 MIN PRN
Status: DISCONTINUED | OUTPATIENT
Start: 2019-08-12 | End: 2019-08-12

## 2019-08-12 RX ORDER — SODIUM CHLORIDE, SODIUM LACTATE, POTASSIUM CHLORIDE, CALCIUM CHLORIDE 600; 310; 30; 20 MG/100ML; MG/100ML; MG/100ML; MG/100ML
INJECTION, SOLUTION INTRAVENOUS CONTINUOUS
Status: DISCONTINUED | OUTPATIENT
Start: 2019-08-12 | End: 2019-08-12

## 2019-08-12 RX ORDER — ALBUTEROL SULFATE 2.5 MG/3ML
2.5 SOLUTION RESPIRATORY (INHALATION) ONCE
Status: COMPLETED | OUTPATIENT
Start: 2019-08-12 | End: 2019-08-12

## 2019-08-12 RX ORDER — HYDROCODONE BITARTRATE AND ACETAMINOPHEN 5; 325 MG/1; MG/1
1 TABLET ORAL EVERY 4 HOURS PRN
Qty: 15 TABLET | Refills: 0 | Status: SHIPPED | OUTPATIENT
Start: 2019-08-12 | End: 2020-01-22

## 2019-08-12 RX ORDER — DEXAMETHASONE SODIUM PHOSPHATE 4 MG/ML
VIAL (ML) INJECTION AS NEEDED
Status: DISCONTINUED | OUTPATIENT
Start: 2019-08-12 | End: 2019-08-12 | Stop reason: SURG

## 2019-08-12 RX ORDER — ONDANSETRON 2 MG/ML
INJECTION INTRAMUSCULAR; INTRAVENOUS AS NEEDED
Status: DISCONTINUED | OUTPATIENT
Start: 2019-08-12 | End: 2019-08-12 | Stop reason: SURG

## 2019-08-12 RX ORDER — HYDROMORPHONE HYDROCHLORIDE 1 MG/ML
0.4 INJECTION, SOLUTION INTRAMUSCULAR; INTRAVENOUS; SUBCUTANEOUS EVERY 5 MIN PRN
Status: DISCONTINUED | OUTPATIENT
Start: 2019-08-12 | End: 2019-08-12

## 2019-08-12 RX ORDER — FAMOTIDINE 20 MG/1
20 TABLET ORAL ONCE
Status: DISCONTINUED | OUTPATIENT
Start: 2019-08-12 | End: 2019-08-12 | Stop reason: HOSPADM

## 2019-08-12 RX ORDER — HYDROCODONE BITARTRATE AND ACETAMINOPHEN 5; 325 MG/1; MG/1
1 TABLET ORAL AS NEEDED
Status: DISCONTINUED | OUTPATIENT
Start: 2019-08-12 | End: 2019-08-12

## 2019-08-12 RX ORDER — GLYCOPYRROLATE 0.2 MG/ML
INJECTION INTRAMUSCULAR; INTRAVENOUS AS NEEDED
Status: DISCONTINUED | OUTPATIENT
Start: 2019-08-12 | End: 2019-08-12 | Stop reason: SURG

## 2019-08-12 RX ORDER — PROCHLORPERAZINE EDISYLATE 5 MG/ML
5 INJECTION INTRAMUSCULAR; INTRAVENOUS ONCE AS NEEDED
Status: DISCONTINUED | OUTPATIENT
Start: 2019-08-12 | End: 2019-08-12

## 2019-08-12 RX ORDER — MIDAZOLAM HYDROCHLORIDE 1 MG/ML
INJECTION INTRAMUSCULAR; INTRAVENOUS AS NEEDED
Status: DISCONTINUED | OUTPATIENT
Start: 2019-08-12 | End: 2019-08-12 | Stop reason: SURG

## 2019-08-12 RX ORDER — NALOXONE HYDROCHLORIDE 0.4 MG/ML
80 INJECTION, SOLUTION INTRAMUSCULAR; INTRAVENOUS; SUBCUTANEOUS AS NEEDED
Status: DISCONTINUED | OUTPATIENT
Start: 2019-08-12 | End: 2019-08-12

## 2019-08-12 RX ORDER — ACETAMINOPHEN 500 MG
1000 TABLET ORAL ONCE
Status: COMPLETED | OUTPATIENT
Start: 2019-08-12 | End: 2019-08-12

## 2019-08-12 RX ORDER — LIDOCAINE HYDROCHLORIDE 20 MG/ML
INJECTION, SOLUTION EPIDURAL; INFILTRATION; INTRACAUDAL; PERINEURAL AS NEEDED
Status: DISCONTINUED | OUTPATIENT
Start: 2019-08-12 | End: 2019-08-12 | Stop reason: SURG

## 2019-08-12 RX ORDER — MORPHINE SULFATE 2 MG/ML
2 INJECTION, SOLUTION INTRAMUSCULAR; INTRAVENOUS EVERY 10 MIN PRN
Status: DISCONTINUED | OUTPATIENT
Start: 2019-08-12 | End: 2019-08-12

## 2019-08-12 RX ORDER — METOCLOPRAMIDE 10 MG/1
10 TABLET ORAL ONCE
Status: DISCONTINUED | OUTPATIENT
Start: 2019-08-12 | End: 2019-08-12 | Stop reason: HOSPADM

## 2019-08-12 RX ORDER — HYDROMORPHONE HYDROCHLORIDE 1 MG/ML
0.2 INJECTION, SOLUTION INTRAMUSCULAR; INTRAVENOUS; SUBCUTANEOUS EVERY 5 MIN PRN
Status: DISCONTINUED | OUTPATIENT
Start: 2019-08-12 | End: 2019-08-12

## 2019-08-12 RX ORDER — METOPROLOL TARTRATE 5 MG/5ML
2.5 INJECTION INTRAVENOUS ONCE
Status: DISCONTINUED | OUTPATIENT
Start: 2019-08-12 | End: 2019-08-12

## 2019-08-12 RX ADMIN — GLYCOPYRROLATE 0.2 MG: 0.2 INJECTION INTRAMUSCULAR; INTRAVENOUS at 10:20:00

## 2019-08-12 RX ADMIN — ONDANSETRON 4 MG: 2 INJECTION INTRAMUSCULAR; INTRAVENOUS at 10:20:00

## 2019-08-12 RX ADMIN — MIDAZOLAM HYDROCHLORIDE 2 MG: 1 INJECTION INTRAMUSCULAR; INTRAVENOUS at 10:15:00

## 2019-08-12 RX ADMIN — DEXAMETHASONE SODIUM PHOSPHATE 4 MG: 4 MG/ML VIAL (ML) INJECTION at 10:20:00

## 2019-08-12 RX ADMIN — SODIUM CHLORIDE, SODIUM LACTATE, POTASSIUM CHLORIDE, CALCIUM CHLORIDE: 600; 310; 30; 20 INJECTION, SOLUTION INTRAVENOUS at 10:58:00

## 2019-08-12 RX ADMIN — CEFAZOLIN SODIUM/WATER 2 G: 2 G/20 ML SYRINGE (ML) INTRAVENOUS at 10:20:00

## 2019-08-12 RX ADMIN — LIDOCAINE HYDROCHLORIDE 40 MG: 20 INJECTION, SOLUTION EPIDURAL; INFILTRATION; INTRACAUDAL; PERINEURAL at 10:20:00

## 2019-08-12 NOTE — ANESTHESIA PREPROCEDURE EVALUATION
Anesthesia PreOp Note    HPI:     Moshe Hernandez is a 79year old female who presents for preoperative consultation requested by: Morton Epley, MD    Date of Surgery: 8/12/2019    Procedure(s):  EXCISION LESION EXTREMITY  Indication: Sebaceous cyst [L72.3] Budesonide-Formoterol Fumarate (SYMBICORT) 160-4.5 MCG/ACT Inhalation Aerosol Inhale 2 puffs into the lungs 2 (two) times daily.  Disp: 3 Inhaler Rfl: 3 8/12/2019 at 0630   Albuterol Sulfate HFA (PROVENTIL HFA) 108 (90 Base) MCG/ACT Inhalation Aero Soln I Not on file    Social Needs      Financial resource strain: Not on file      Food insecurity:        Worry: Not on file        Inability: Not on file      Transportation needs:        Medical: Not on file        Non-medical: Not on file    Tobacco Use tanning: Not Asked        Outdoor occupation: Not Asked        Pt has a pacemaker: No        Pt has a defibrillator: No        Breast feeding: Not Asked        Reaction to local anesthetic: No    Social History Narrative      Not on file      Available pre risks including possible dental damage if relevant, major complications, and any alternative forms of anesthetic management. All of the patient's questions were answered to the best of my ability. The patient desires the anesthetic management as planned.

## 2019-08-12 NOTE — ANESTHESIA POSTPROCEDURE EVALUATION
Patient: Radha Lainez    Procedure Summary     Date:  08/12/19 Room / Location:  [address] [place] [address] MAIN OR 05 / [address] [place] [address] MAIN OR    Anesthesia Start:  5266 Anesthesia Stop:      Procedure:  EXCISION LESION EXTREMITY (Right Upper Arm) Diagnosis:       Sebaceous cyst

## 2019-08-12 NOTE — H&P
HPI:    Patient ID: Sravani Flaherty is a 79year old female presenting with Patient presents with:  Abscess: Pt referred by Dr. Marcos Hernandez regarding  right upper extremity. Pt states on Sat. she woke up and noticed tenderness and redness to area.   Pt started Minutes per session: Not on file      Stress: Not on file    Relationships      Social connections:        Talks on phone: Not on file        Gets together: Not on file        Attends [employer] service: Not on file        Active member of club or org Sulfamethoxazole-TMP -160 MG Oral Tab per tablet Take 1 tablet by mouth 2 (two) times daily. Disp: 14 tablet Rfl: 0   predniSONE 20 MG Oral Tab Take 1 tablet (20 mg total) by mouth 2 (two) times daily.  Disp: 4 tablet Rfl: 0   albuterol sulfate (2.5 M Comment:Other reaction(s): GI upset  Meloxicam                 Varenicline                 Comment:Other reaction(s): mood swings   PHYSICAL EXAM:   BP (!) 183/82   Pulse 74   Temp 98.2 °F (36.8 °C)   Resp 18   Ht 5' 8\" (1.727 m)   Wt 193 lb (87.5 kg)

## 2019-08-12 NOTE — OR PREOP
Pt. Coughing and with inspiratory/exp. Wheezing. Dr. Merline Shropshire notified. Alb. Neb. tx ordered. Pt. States that this is her baseline.

## 2019-08-12 NOTE — OPERATIVE REPORT
Operative Report    Patient Name:  Alena Weiss  MR:  [id number]  :  10/14/1950  DOS:  19    Preop Dx:  Sebaceous cyst [L72.3]  Postop Dx:  Sebaceous cyst [L72.3]  Procedure:  Procedure(s):  Excision of Right Arm Cyst  Surgeon:  Blanco Enamorado MD

## 2019-08-12 NOTE — ADDENDUM NOTE
Addendum  created 08/12/19 1203 by Sonu Ladd CRNA    Attestation recorded in [facility] filed

## 2019-08-20 ENCOUNTER — OFFICE VISIT (OUTPATIENT)
Dept: SURGERY | Facility: CLINIC | Age: 69
End: 2019-08-20
Payer: MEDICARE

## 2019-08-20 VITALS — BODY MASS INDEX: 29 KG/M2 | WEIGHT: 188 LBS

## 2019-08-20 DIAGNOSIS — Z09 POSTOPERATIVE EXAMINATION: Primary | ICD-10-CM

## 2019-08-20 PROCEDURE — G0463 HOSPITAL OUTPT CLINIC VISIT: HCPCS | Performed by: SURGERY

## 2019-08-20 PROCEDURE — 99024 POSTOP FOLLOW-UP VISIT: CPT | Performed by: SURGERY

## 2019-08-20 NOTE — PROGRESS NOTES
Patient presents with:  Post-Op: S/P Incision of sebaceous cyst.  Area has no redness, swelling. There is no pain. Incision is well healed. Denies fevers. Wt 188 lb (85.3 kg)   LMP 11/03/2005   BMI 28.59 kg/m²   Gen:  NAD  Right arm incision c/d/i.

## 2019-11-06 ENCOUNTER — OFFICE VISIT (OUTPATIENT)
Dept: INTERNAL MEDICINE CLINIC | Facility: CLINIC | Age: 69
End: 2019-11-06
Payer: MEDICARE

## 2019-11-06 VITALS
HEIGHT: 68 IN | BODY MASS INDEX: 29.4 KG/M2 | WEIGHT: 194 LBS | RESPIRATION RATE: 18 BRPM | SYSTOLIC BLOOD PRESSURE: 130 MMHG | TEMPERATURE: 98 F | DIASTOLIC BLOOD PRESSURE: 80 MMHG | HEART RATE: 59 BPM

## 2019-11-06 DIAGNOSIS — Z12.11 SCREENING FOR COLON CANCER: ICD-10-CM

## 2019-11-06 DIAGNOSIS — Z78.0 MENOPAUSE: Primary | ICD-10-CM

## 2019-11-06 DIAGNOSIS — Z12.39 SCREENING FOR BREAST CANCER: ICD-10-CM

## 2019-11-06 DIAGNOSIS — Z12.31 ENCOUNTER FOR SCREENING MAMMOGRAM FOR BREAST CANCER: ICD-10-CM

## 2019-11-06 DIAGNOSIS — Z00.00 ANNUAL PHYSICAL EXAM: ICD-10-CM

## 2019-11-06 PROBLEM — L08.9 INFECTED SEBACEOUS CYST: Status: RESOLVED | Noted: 2019-06-11 | Resolved: 2019-11-06

## 2019-11-06 PROBLEM — L72.3 INFECTED SEBACEOUS CYST: Status: RESOLVED | Noted: 2019-06-11 | Resolved: 2019-11-06

## 2019-11-06 PROCEDURE — 99214 OFFICE O/P EST MOD 30 MIN: CPT | Performed by: INTERNAL MEDICINE

## 2019-11-06 RX ORDER — BUDESONIDE AND FORMOTEROL FUMARATE DIHYDRATE 160; 4.5 UG/1; UG/1
2 AEROSOL RESPIRATORY (INHALATION) 2 TIMES DAILY
Qty: 3 INHALER | Refills: 3 | Status: SHIPPED | OUTPATIENT
Start: 2019-11-06 | End: 2021-02-17

## 2019-11-06 RX ORDER — ALBUTEROL SULFATE 90 UG/1
2 AEROSOL, METERED RESPIRATORY (INHALATION) EVERY 6 HOURS PRN
Qty: 2 INHALER | Refills: 3 | Status: SHIPPED | OUTPATIENT
Start: 2019-11-06 | End: 2021-07-07

## 2019-11-06 RX ORDER — NICOTINE 21 MG/24HR
1 PATCH, TRANSDERMAL 24 HOURS TRANSDERMAL EVERY 24 HOURS
Qty: 30 PATCH | Refills: 0 | Status: SHIPPED | OUTPATIENT
Start: 2019-11-06 | End: 2020-03-29

## 2019-11-06 RX ORDER — METOPROLOL TARTRATE 50 MG/1
50 TABLET, FILM COATED ORAL 2 TIMES DAILY
Qty: 180 TABLET | Refills: 1 | Status: CANCELLED | OUTPATIENT
Start: 2019-11-06

## 2019-11-06 RX ORDER — TRIAMTERENE AND HYDROCHLOROTHIAZIDE 37.5; 25 MG/1; MG/1
1 TABLET ORAL
Qty: 90 TABLET | Refills: 1 | Status: SHIPPED | OUTPATIENT
Start: 2019-11-06 | End: 2020-03-29

## 2019-11-06 NOTE — PROGRESS NOTES
HPI:    Patient ID: Maya Escamilla is a 81year old female. HPI she is here today for follow-up. She is not checking her blood pressure at home but she is taking her medications regularly. She is compliant with her diet.   Needs refill    BP Reading Tartrate 50 MG Oral Tab Take 1 tablet (50 mg total) by mouth 2 (two) times daily. 180 tablet 1   • Triamterene-HCTZ 37.5-25 MG Oral Tab Take 1 tablet by mouth once daily.  90 tablet 1   • Tiotropium Bromide Monohydrate (SPIRIVA HANDIHALER) 18 MCG Inhalation Social History: Social History    Tobacco Use      Smoking status: Current Every Day Smoker        Packs/day: 0.50        Years: 50.00        Pack years: 25        Types: Cigarettes      Smokeless tobacco: Never Used    Alcohol use:  Yes      Alcohol/week wheezes. She has no rales. She exhibits no tenderness. Abdominal: Soft. Bowel sounds are normal. She exhibits no shifting dullness, no distension and no mass. There is no hepatosplenomegaly. There is no tenderness.  There is no rigidity, no rebound, no gu hours as needed for Wheezing.        Imaging & Referrals:  None        ZX#[id number]

## 2019-11-06 NOTE — PATIENT INSTRUCTIONS
Hypertension controlled advised to continue with current medication check blood pressure at home and bring logbook next visit watch diet avoid salty food  Screening for colon cancer fit test and referral for GI  Screening for breast cancer referral for shayne

## 2019-11-18 NOTE — PROGRESS NOTES
Dear  Elida Milan :           As you know, Mahesh Bermudez is a 16-year-old female who I am now seeing for COPD. HISTORY OF PRESENT ILLNESS: The patient has a long-standing tobacco habit which she continues today.   She has smoked throughout her adult life and at without adenopathy, thyromegaly, JVD nor bruit. Slightly diminished to auscultation and percussion. Cardiac regular rate and rhythm no murmur. Abdomen nontender, without hepatosplenomegaly and no mass appreciable.  Extremities without clubbing cyanosis nor

## 2019-11-19 ENCOUNTER — LAB ENCOUNTER (OUTPATIENT)
Dept: LAB | Age: 69
End: 2019-11-19
Attending: INTERNAL MEDICINE
Payer: MEDICARE

## 2019-11-19 DIAGNOSIS — Z00.00 ANNUAL PHYSICAL EXAM: ICD-10-CM

## 2019-11-19 PROCEDURE — 85025 COMPLETE CBC W/AUTO DIFF WBC: CPT

## 2019-11-19 PROCEDURE — 80053 COMPREHEN METABOLIC PANEL: CPT

## 2019-11-19 PROCEDURE — 84443 ASSAY THYROID STIM HORMONE: CPT

## 2019-11-19 PROCEDURE — 80061 LIPID PANEL: CPT

## 2019-11-19 PROCEDURE — 36415 COLL VENOUS BLD VENIPUNCTURE: CPT

## 2019-11-23 ENCOUNTER — HOSPITAL ENCOUNTER (OUTPATIENT)
Dept: CT IMAGING | Facility: HOSPITAL | Age: 69
Discharge: HOME | End: 2019-11-23
Attending: INTERNAL MEDICINE
Payer: MEDICARE

## 2019-11-23 DIAGNOSIS — Z87.891 PERSONAL HISTORY OF TOBACCO USE, PRESENTING HAZARDS TO HEALTH: ICD-10-CM

## 2019-11-25 ENCOUNTER — TELEPHONE (OUTPATIENT)
Dept: PULMONOLOGY | Facility: CLINIC | Age: 69
End: 2019-11-25

## 2019-11-25 DIAGNOSIS — E04.1 THYROID NODULE: ICD-10-CM

## 2019-11-25 DIAGNOSIS — Z87.891 PERSONAL HISTORY OF TOBACCO USE, PRESENTING HAZARDS TO HEALTH: Primary | ICD-10-CM

## 2019-11-25 NOTE — TELEPHONE ENCOUNTER
Called and notified pt of results per MD message below, pt verbalized understanding. CT order placed in chronic calendar and Thyroid US to be completed at this time.

## 2019-11-25 NOTE — TELEPHONE ENCOUNTER
----- Message from Ajit Alex MD sent at 11/24/2019  9:13 PM CST -----  RN, please call the patient to let her know that there was no evidence of lung cancer. There was a 2.4 cm thyroid nodule and the patient should get a thyroid ultrasound.   Please

## 2019-12-11 ENCOUNTER — HOSPITAL ENCOUNTER (OUTPATIENT)
Dept: ULTRASOUND IMAGING | Age: 69
Discharge: HOME | End: 2019-12-11
Attending: INTERNAL MEDICINE
Payer: MEDICARE

## 2019-12-11 DIAGNOSIS — E04.1 THYROID NODULE: ICD-10-CM

## 2019-12-11 PROCEDURE — 76536 US EXAM OF HEAD AND NECK: CPT | Performed by: INTERNAL MEDICINE

## 2019-12-17 ENCOUNTER — TELEPHONE (OUTPATIENT)
Dept: PULMONOLOGY | Facility: CLINIC | Age: 69
End: 2019-12-17

## 2019-12-17 ENCOUNTER — HOSPITAL ENCOUNTER (OUTPATIENT)
Dept: BONE DENSITY | Age: 69
Discharge: HOME | End: 2019-12-17
Attending: INTERNAL MEDICINE
Payer: MEDICARE

## 2019-12-17 DIAGNOSIS — Z78.0 MENOPAUSE: ICD-10-CM

## 2019-12-17 DIAGNOSIS — E04.1 THYROID NODULE: Primary | ICD-10-CM

## 2019-12-17 PROCEDURE — 77080 DXA BONE DENSITY AXIAL: CPT | Performed by: INTERNAL MEDICINE

## 2019-12-17 NOTE — TELEPHONE ENCOUNTER
----- Message from Malcom Hanks MD sent at 12/15/2019  9:09 PM CST -----  RN, please call the patient to let her know that thyroid ultrasound suggested multinodular findings. Radiology has suggested biopsy of the larger lesion of the left lobe.   [place]

## 2019-12-19 NOTE — TELEPHONE ENCOUNTER
Pt notified with understanding, central scheduling contact information provided, order pended for signature. PJC please sign if correct.

## 2019-12-26 RX ORDER — METOPROLOL TARTRATE 50 MG/1
50 TABLET, FILM COATED ORAL 2 TIMES DAILY
Qty: 180 TABLET | Refills: 0 | Status: SHIPPED | OUTPATIENT
Start: 2019-12-26 | End: 2020-03-30

## 2020-01-06 NOTE — TELEPHONE ENCOUNTER
Current Outpatient Medications   Medication Sig Dispense Refill   • Tiotropium Bromide Monohydrate (SPIRIVA HANDIHALER) 18 MCG Inhalation Cap INHALE THE CONTENTS OF 1 CAPSULE VIA HANDIHALER DAILY 30 capsule 6

## 2020-01-07 ENCOUNTER — TELEPHONE (OUTPATIENT)
Dept: PULMONOLOGY | Facility: CLINIC | Age: 70
End: 2020-01-07

## 2020-01-07 NOTE — TELEPHONE ENCOUNTER
Discussed below w/ pt including Dr. Hanh Eugene orders. Explained rx for Ynes Sharps was sent to pharmacy. She voiced understanding.

## 2020-01-07 NOTE — TELEPHONE ENCOUNTER
Current Outpatient Medications   Medication Sig Dispense Refill   • Tiotropium Bromide Monohydrate (SPIRIVA HANDIHALER) 18 MCG Inhalation Cap INHALE THE CONTENTS OF 1 CAPSULE VIA HANDIHALER DAILY 30 capsule 6     Per pharmacy this med not covered by pt's p

## 2020-01-08 ENCOUNTER — HOSPITAL ENCOUNTER (OUTPATIENT)
Dept: ULTRASOUND IMAGING | Facility: HOSPITAL | Age: 70
Discharge: HOME | End: 2020-01-08
Attending: INTERNAL MEDICINE
Payer: MEDICARE

## 2020-01-08 VITALS — HEIGHT: 67 IN | BODY MASS INDEX: 29.82 KG/M2 | WEIGHT: 190 LBS

## 2020-01-08 DIAGNOSIS — E04.1 THYROID NODULE: ICD-10-CM

## 2020-01-08 PROCEDURE — 88173 CYTOPATH EVAL FNA REPORT: CPT | Performed by: INTERNAL MEDICINE

## 2020-01-08 PROCEDURE — 10005 FNA BX W/US GDN 1ST LES: CPT | Performed by: INTERNAL MEDICINE

## 2020-01-08 PROCEDURE — 88177 CYTP FNA EVAL EA ADDL: CPT | Performed by: INTERNAL MEDICINE

## 2020-01-08 PROCEDURE — 88172 CYTP DX EVAL FNA 1ST EA SITE: CPT | Performed by: INTERNAL MEDICINE

## 2020-01-08 PROCEDURE — 88305 TISSUE EXAM BY PATHOLOGIST: CPT | Performed by: INTERNAL MEDICINE

## 2020-01-08 NOTE — IMAGING NOTE
1330 Pt arrived to Radiology holding     1335 History taken; patient denies use of blood thinners, fish oil, herbal supplements, aspirin, and NSAIDS     1344 /69 HR 79     1405 ultrasound room #4     1409 Scans taken by [facility] ultrasound  sonographer

## 2020-01-10 DIAGNOSIS — E04.1 THYROID NODULE: Primary | ICD-10-CM

## 2020-01-11 ENCOUNTER — TELEPHONE (OUTPATIENT)
Dept: INTERNAL MEDICINE CLINIC | Facility: CLINIC | Age: 70
End: 2020-01-11

## 2020-01-11 NOTE — TELEPHONE ENCOUNTER
Pt needs understanding of biopsy report and recommendation. Explained the thyroid biopsy report and the recommendation to follow up with ENT (gave contact number for Dr Claribel Sanz) ; advised to discuss the biopsy further for better understanding. She agreed.

## 2020-01-16 ENCOUNTER — OFFICE VISIT (OUTPATIENT)
Dept: OTOLARYNGOLOGY | Facility: CLINIC | Age: 70
End: 2020-01-16
Payer: MEDICARE

## 2020-01-16 VITALS
BODY MASS INDEX: 29.82 KG/M2 | WEIGHT: 190 LBS | TEMPERATURE: 97 F | SYSTOLIC BLOOD PRESSURE: 130 MMHG | DIASTOLIC BLOOD PRESSURE: 84 MMHG | HEIGHT: 67 IN

## 2020-01-16 DIAGNOSIS — E04.1 THYROID NODULE: Primary | ICD-10-CM

## 2020-01-16 PROCEDURE — 99203 OFFICE O/P NEW LOW 30 MIN: CPT | Performed by: OTOLARYNGOLOGY

## 2020-01-16 PROCEDURE — G0463 HOSPITAL OUTPT CLINIC VISIT: HCPCS | Performed by: OTOLARYNGOLOGY

## 2020-01-16 NOTE — PROGRESS NOTES
Miguel Crump is a 81year old female. Patient presents with:  Consult: referred by Dr Cecilio Parrish regarding thyroid nodule, US FNA done on 1-8-20    HPI:   She was undergoing a pulmonary test and noted to have a nodule on the left side of her thyroid.   [facility])    • Depression    • High blood pressure    • Meningioma [facility])     July 2017 started with dizzy spell and visual disturbance.     • Unspecified essential hypertension       Social History:  Social History    Tobacco Use      Smoking status: Current Ev Right: Normal, Left: Normal. Canal - Left: Normal. TM - Right: Normal, Left: Normal.     ASSESSMENT AND PLAN:   1. Thyroid nodule  She has a left-sided thyroid nodule with a needle biopsy consistent with follicular neoplasm with atypia.   I discussed the fi

## 2020-01-17 ENCOUNTER — TELEPHONE (OUTPATIENT)
Dept: OTOLARYNGOLOGY | Facility: CLINIC | Age: 70
End: 2020-01-17

## 2020-01-17 NOTE — TELEPHONE ENCOUNTER
Called and spoke with patient. lio is scheduled for surgery with Dr Emigdio Guerrero on 02/03/20. Pre post op/NSAID instructions reviewed.

## 2020-01-22 ENCOUNTER — OFFICE VISIT (OUTPATIENT)
Dept: INTERNAL MEDICINE CLINIC | Facility: CLINIC | Age: 70
End: 2020-01-22
Payer: MEDICARE

## 2020-01-22 VITALS
RESPIRATION RATE: 18 BRPM | BODY MASS INDEX: 30.61 KG/M2 | SYSTOLIC BLOOD PRESSURE: 135 MMHG | DIASTOLIC BLOOD PRESSURE: 80 MMHG | TEMPERATURE: 98 F | WEIGHT: 195 LBS | HEART RATE: 76 BPM | HEIGHT: 67 IN

## 2020-01-22 DIAGNOSIS — Z01.818 PREOP EXAM FOR INTERNAL MEDICINE: ICD-10-CM

## 2020-01-22 DIAGNOSIS — F32.4 MAJOR DEPRESSIVE DISORDER WITH SINGLE EPISODE, IN PARTIAL REMISSION (HCC): ICD-10-CM

## 2020-01-22 DIAGNOSIS — D32.9 MENINGIOMA (HCC): ICD-10-CM

## 2020-01-22 DIAGNOSIS — N18.30 CKD (CHRONIC KIDNEY DISEASE) STAGE 3, GFR 30-59 ML/MIN (HCC): ICD-10-CM

## 2020-01-22 DIAGNOSIS — D49.7 FOLLICULAR NEOPLASM OF THYROID: Primary | ICD-10-CM

## 2020-01-22 DIAGNOSIS — J42 CHRONIC BRONCHITIS, UNSPECIFIED CHRONIC BRONCHITIS TYPE (HCC): ICD-10-CM

## 2020-01-22 LAB
ANION GAP SERPL CALC-SCNC: 10 MMOL/L (ref 0–18)
BUN BLD-MCNC: 28 MG/DL (ref 7–18)
BUN/CREAT SERPL: 19.6 (ref 10–20)
CALCIUM BLD-MCNC: 9.5 MG/DL (ref 8.5–10.1)
CHLORIDE SERPL-SCNC: 98 MMOL/L (ref 98–112)
CO2 SERPL-SCNC: 29 MMOL/L (ref 21–32)
CREAT BLD-MCNC: 1.43 MG/DL (ref 0.55–1.02)
GLUCOSE BLD-MCNC: 88 MG/DL (ref 70–99)
OSMOLALITY SERPL CALC.SUM OF ELEC: 289 MOSM/KG (ref 275–295)
PATIENT FASTING Y/N/NP: YES
POTASSIUM SERPL-SCNC: 3.4 MMOL/L (ref 3.5–5.1)
SODIUM SERPL-SCNC: 137 MMOL/L (ref 136–145)

## 2020-01-22 PROCEDURE — 36415 COLL VENOUS BLD VENIPUNCTURE: CPT | Performed by: INTERNAL MEDICINE

## 2020-01-22 PROCEDURE — 99214 OFFICE O/P EST MOD 30 MIN: CPT | Performed by: INTERNAL MEDICINE

## 2020-01-22 PROCEDURE — 93000 ELECTROCARDIOGRAM COMPLETE: CPT | Performed by: INTERNAL MEDICINE

## 2020-01-22 NOTE — PROGRESS NOTES
John Crouch is a 81year old female who presents for a pre-operative physical exam. Patient is to have left hemithyroidectomy  to be done by Dr. James Harris at [place]  on February  3   Pt has had previous anesthesia:  No.    Patient presents with:  Pre-Op Date   • Anxiety    • COPD (chronic obstructive pulmonary disease) ([facility] [address]    • Depression    • High blood pressure    • Meningioma [facility])     July 2017 started with dizzy spell and visual disturbance.     • Unspecified essential hypertension       Past Surgic pain on exertion  GI: denies abdominal pain  : denies dysuria  MUSCULOSKELETAL: denies joint pain  NEURO: denies headaches  PSYCHE: denies depression or anxiety  HEMATOLOGIC: denies hx of anemia  ALL/ASTHMA: denies hx of allergy or asthma    EXAM:   BP 1 evaluation  General Category: Atypia/Follicular lesion of undetermined significance  Diagnosis:  · Follicular lesion of undetermined significance        Embedded Images      Final Diagnosis Comment       Smears of the left thyroid nodule are cellular with ordered    4. Renal  No hx of renal disease  CMP ordered      Medically optimized- for surgery    Assessment:  No diagnosis found. Plan   Orders:  No orders of the defined types were placed in this encounter.     Medications filled today:  Requested Pres

## 2020-01-23 ENCOUNTER — TELEPHONE (OUTPATIENT)
Dept: INTERNAL MEDICINE CLINIC | Facility: CLINIC | Age: 70
End: 2020-01-23

## 2020-01-23 NOTE — TELEPHONE ENCOUNTER
When speaking with patient RE: lab results and recommendations form Dr. Kanwal Gonzalez, patient asked, \"Does this mean I am cleared for surgery with Dr. Los Copeland if my results weren't normal? Can someone etither call me or send me a "[employer] message to let me know onc

## 2020-01-24 ENCOUNTER — TELEPHONE (OUTPATIENT)
Dept: INTERNAL MEDICINE CLINIC | Facility: CLINIC | Age: 70
End: 2020-01-24

## 2020-01-24 NOTE — TELEPHONE ENCOUNTER
Patient came in to the office to the office states she started with sinusitis infection on Thursday a lot of congestion drainage she is having surgery on February 3 and is asking for antibiotics before it gets worse if possible

## 2020-01-24 NOTE — TELEPHONE ENCOUNTER
Ok, ill send amox-//clav-  Can take with food , drink more fluids , steam can help, if not better she needs to follow up

## 2020-01-30 NOTE — TELEPHONE ENCOUNTER
surgical clearance.    Message [id number]   From  Mary Ambrosio RN To  Martina Guzman and Delivered  1/24/2020  9:44 AM   Last Read in MyChart  1/26/2020  9:58 AM by Gavin Morrison

## 2020-02-03 ENCOUNTER — HOSPITAL ENCOUNTER (OUTPATIENT)
Facility: HOSPITAL | Age: 70
Setting detail: HOSPITAL OUTPATIENT SURGERY
Discharge: HOME | End: 2020-02-03
Attending: OTOLARYNGOLOGY | Admitting: OTOLARYNGOLOGY
Payer: MEDICARE

## 2020-02-03 ENCOUNTER — ANESTHESIA (OUTPATIENT)
Dept: SURGERY | Facility: HOSPITAL | Age: 70
End: 2020-02-03
Payer: MEDICARE

## 2020-02-03 ENCOUNTER — ANESTHESIA EVENT (OUTPATIENT)
Dept: SURGERY | Facility: HOSPITAL | Age: 70
End: 2020-02-03
Payer: MEDICARE

## 2020-02-03 VITALS
OXYGEN SATURATION: 100 % | DIASTOLIC BLOOD PRESSURE: 72 MMHG | TEMPERATURE: 98 F | HEIGHT: 67 IN | BODY MASS INDEX: 30.45 KG/M2 | HEART RATE: 56 BPM | WEIGHT: 194 LBS | SYSTOLIC BLOOD PRESSURE: 159 MMHG | RESPIRATION RATE: 18 BRPM

## 2020-02-03 DIAGNOSIS — E04.1 THYROID NODULE: ICD-10-CM

## 2020-02-03 PROCEDURE — 0GTG0ZZ RESECTION OF LEFT THYROID GLAND LOBE, OPEN APPROACH: ICD-10-PCS | Performed by: OTOLARYNGOLOGY

## 2020-02-03 PROCEDURE — 60220 PARTIAL REMOVAL OF THYROID: CPT | Performed by: OTOLARYNGOLOGY

## 2020-02-03 RX ORDER — HYDROMORPHONE HYDROCHLORIDE 1 MG/ML
0.4 INJECTION, SOLUTION INTRAMUSCULAR; INTRAVENOUS; SUBCUTANEOUS EVERY 5 MIN PRN
Status: DISCONTINUED | OUTPATIENT
Start: 2020-02-03 | End: 2020-02-03

## 2020-02-03 RX ORDER — LIDOCAINE HYDROCHLORIDE 10 MG/ML
INJECTION, SOLUTION EPIDURAL; INFILTRATION; INTRACAUDAL; PERINEURAL AS NEEDED
Status: DISCONTINUED | OUTPATIENT
Start: 2020-02-03 | End: 2020-02-03 | Stop reason: SURG

## 2020-02-03 RX ORDER — PROCHLORPERAZINE EDISYLATE 5 MG/ML
5 INJECTION INTRAMUSCULAR; INTRAVENOUS ONCE AS NEEDED
Status: DISCONTINUED | OUTPATIENT
Start: 2020-02-03 | End: 2020-02-03

## 2020-02-03 RX ORDER — HYDROCODONE BITARTRATE AND ACETAMINOPHEN 5; 325 MG/1; MG/1
TABLET ORAL
Qty: 20 TABLET | Refills: 0 | Status: SHIPPED | OUTPATIENT
Start: 2020-02-03 | End: 2020-04-07

## 2020-02-03 RX ORDER — ACETAMINOPHEN 500 MG
1000 TABLET ORAL ONCE
Status: COMPLETED | OUTPATIENT
Start: 2020-02-03 | End: 2020-02-03

## 2020-02-03 RX ORDER — METOPROLOL TARTRATE 5 MG/5ML
2.5 INJECTION INTRAVENOUS ONCE
Status: DISCONTINUED | OUTPATIENT
Start: 2020-02-03 | End: 2020-02-03

## 2020-02-03 RX ORDER — NALOXONE HYDROCHLORIDE 0.4 MG/ML
80 INJECTION, SOLUTION INTRAMUSCULAR; INTRAVENOUS; SUBCUTANEOUS AS NEEDED
Status: DISCONTINUED | OUTPATIENT
Start: 2020-02-03 | End: 2020-02-03

## 2020-02-03 RX ORDER — ONDANSETRON 2 MG/ML
INJECTION INTRAMUSCULAR; INTRAVENOUS AS NEEDED
Status: DISCONTINUED | OUTPATIENT
Start: 2020-02-03 | End: 2020-02-03 | Stop reason: SURG

## 2020-02-03 RX ORDER — ONDANSETRON 2 MG/ML
4 INJECTION INTRAMUSCULAR; INTRAVENOUS ONCE AS NEEDED
Status: DISCONTINUED | OUTPATIENT
Start: 2020-02-03 | End: 2020-02-03

## 2020-02-03 RX ORDER — FAMOTIDINE 20 MG/1
20 TABLET ORAL ONCE
Status: COMPLETED | OUTPATIENT
Start: 2020-02-03 | End: 2020-02-03

## 2020-02-03 RX ORDER — HYDRALAZINE HYDROCHLORIDE 20 MG/ML
5 INJECTION INTRAMUSCULAR; INTRAVENOUS ONCE
Status: COMPLETED | OUTPATIENT
Start: 2020-02-03 | End: 2020-02-03

## 2020-02-03 RX ORDER — SODIUM CHLORIDE, SODIUM LACTATE, POTASSIUM CHLORIDE, CALCIUM CHLORIDE 600; 310; 30; 20 MG/100ML; MG/100ML; MG/100ML; MG/100ML
INJECTION, SOLUTION INTRAVENOUS CONTINUOUS
Status: DISCONTINUED | OUTPATIENT
Start: 2020-02-03 | End: 2020-02-03

## 2020-02-03 RX ORDER — HALOPERIDOL 5 MG/ML
0.25 INJECTION INTRAMUSCULAR ONCE AS NEEDED
Status: DISCONTINUED | OUTPATIENT
Start: 2020-02-03 | End: 2020-02-03

## 2020-02-03 RX ORDER — MORPHINE SULFATE 4 MG/ML
2 INJECTION, SOLUTION INTRAMUSCULAR; INTRAVENOUS EVERY 10 MIN PRN
Status: DISCONTINUED | OUTPATIENT
Start: 2020-02-03 | End: 2020-02-03

## 2020-02-03 RX ORDER — ONDANSETRON 4 MG/1
4 TABLET, ORALLY DISINTEGRATING ORAL EVERY 6 HOURS PRN
Status: DISCONTINUED | OUTPATIENT
Start: 2020-02-03 | End: 2020-02-03

## 2020-02-03 RX ORDER — CEFAZOLIN SODIUM/WATER 2 G/20 ML
2 SYRINGE (ML) INTRAVENOUS ONCE
Status: COMPLETED | OUTPATIENT
Start: 2020-02-03 | End: 2020-02-03

## 2020-02-03 RX ORDER — HYDROCODONE BITARTRATE AND ACETAMINOPHEN 5; 325 MG/1; MG/1
1 TABLET ORAL EVERY 4 HOURS PRN
Status: DISCONTINUED | OUTPATIENT
Start: 2020-02-03 | End: 2020-02-03

## 2020-02-03 RX ORDER — SODIUM CHLORIDE 0.9 % (FLUSH) 0.9 %
10 SYRINGE (ML) INJECTION AS NEEDED
Status: DISCONTINUED | OUTPATIENT
Start: 2020-02-03 | End: 2020-02-03

## 2020-02-03 RX ORDER — GLYCOPYRROLATE 0.2 MG/ML
INJECTION INTRAMUSCULAR; INTRAVENOUS AS NEEDED
Status: DISCONTINUED | OUTPATIENT
Start: 2020-02-03 | End: 2020-02-03 | Stop reason: SURG

## 2020-02-03 RX ORDER — HYDROCODONE BITARTRATE AND ACETAMINOPHEN 5; 325 MG/1; MG/1
1 TABLET ORAL AS NEEDED
Status: DISCONTINUED | OUTPATIENT
Start: 2020-02-03 | End: 2020-02-03

## 2020-02-03 RX ORDER — METOCLOPRAMIDE 10 MG/1
10 TABLET ORAL ONCE
Status: COMPLETED | OUTPATIENT
Start: 2020-02-03 | End: 2020-02-03

## 2020-02-03 RX ORDER — DEXAMETHASONE SODIUM PHOSPHATE 4 MG/ML
VIAL (ML) INJECTION AS NEEDED
Status: DISCONTINUED | OUTPATIENT
Start: 2020-02-03 | End: 2020-02-03 | Stop reason: SURG

## 2020-02-03 RX ORDER — HYDROMORPHONE HYDROCHLORIDE 1 MG/ML
0.2 INJECTION, SOLUTION INTRAMUSCULAR; INTRAVENOUS; SUBCUTANEOUS EVERY 5 MIN PRN
Status: DISCONTINUED | OUTPATIENT
Start: 2020-02-03 | End: 2020-02-03

## 2020-02-03 RX ORDER — ONDANSETRON 2 MG/ML
4 INJECTION INTRAMUSCULAR; INTRAVENOUS EVERY 6 HOURS PRN
Status: DISCONTINUED | OUTPATIENT
Start: 2020-02-03 | End: 2020-02-03

## 2020-02-03 RX ORDER — MORPHINE SULFATE 4 MG/ML
4 INJECTION, SOLUTION INTRAMUSCULAR; INTRAVENOUS EVERY 10 MIN PRN
Status: DISCONTINUED | OUTPATIENT
Start: 2020-02-03 | End: 2020-02-03

## 2020-02-03 RX ORDER — MORPHINE SULFATE 10 MG/ML
6 INJECTION, SOLUTION INTRAMUSCULAR; INTRAVENOUS EVERY 10 MIN PRN
Status: DISCONTINUED | OUTPATIENT
Start: 2020-02-03 | End: 2020-02-03

## 2020-02-03 RX ORDER — HYDROCODONE BITARTRATE AND ACETAMINOPHEN 5; 325 MG/1; MG/1
2 TABLET ORAL AS NEEDED
Status: DISCONTINUED | OUTPATIENT
Start: 2020-02-03 | End: 2020-02-03

## 2020-02-03 RX ORDER — LIDOCAINE HYDROCHLORIDE 40 MG/ML
SOLUTION TOPICAL AS NEEDED
Status: DISCONTINUED | OUTPATIENT
Start: 2020-02-03 | End: 2020-02-03 | Stop reason: SURG

## 2020-02-03 RX ORDER — HYDROMORPHONE HYDROCHLORIDE 1 MG/ML
0.6 INJECTION, SOLUTION INTRAMUSCULAR; INTRAVENOUS; SUBCUTANEOUS EVERY 5 MIN PRN
Status: DISCONTINUED | OUTPATIENT
Start: 2020-02-03 | End: 2020-02-03

## 2020-02-03 RX ORDER — LIDOCAINE HYDROCHLORIDE AND EPINEPHRINE 10; 10 MG/ML; UG/ML
INJECTION, SOLUTION INFILTRATION; PERINEURAL AS NEEDED
Status: DISCONTINUED | OUTPATIENT
Start: 2020-02-03 | End: 2020-02-03 | Stop reason: HOSPADM

## 2020-02-03 RX ORDER — ROCURONIUM BROMIDE 10 MG/ML
INJECTION, SOLUTION INTRAVENOUS AS NEEDED
Status: DISCONTINUED | OUTPATIENT
Start: 2020-02-03 | End: 2020-02-03 | Stop reason: SURG

## 2020-02-03 RX ADMIN — CEFAZOLIN SODIUM/WATER 2 G: 2 G/20 ML SYRINGE (ML) INTRAVENOUS at 10:09:00

## 2020-02-03 RX ADMIN — ONDANSETRON 4 MG: 2 INJECTION INTRAMUSCULAR; INTRAVENOUS at 10:09:00

## 2020-02-03 RX ADMIN — GLYCOPYRROLATE 0.2 MG: 0.2 INJECTION INTRAMUSCULAR; INTRAVENOUS at 10:09:00

## 2020-02-03 RX ADMIN — LIDOCAINE HYDROCHLORIDE 50 MG: 10 INJECTION, SOLUTION EPIDURAL; INFILTRATION; INTRACAUDAL; PERINEURAL at 10:09:00

## 2020-02-03 RX ADMIN — LIDOCAINE HYDROCHLORIDE 4 ML: 40 SOLUTION TOPICAL at 10:09:00

## 2020-02-03 RX ADMIN — ROCURONIUM BROMIDE 10 MG: 10 INJECTION, SOLUTION INTRAVENOUS at 10:09:00

## 2020-02-03 RX ADMIN — SODIUM CHLORIDE, SODIUM LACTATE, POTASSIUM CHLORIDE, CALCIUM CHLORIDE: 600; 310; 30; 20 INJECTION, SOLUTION INTRAVENOUS at 11:13:00

## 2020-02-03 RX ADMIN — DEXAMETHASONE SODIUM PHOSPHATE 4 MG: 4 MG/ML VIAL (ML) INJECTION at 10:09:00

## 2020-02-03 NOTE — ANESTHESIA PROCEDURE NOTES
Airway  Date/Time: 2/3/2020 10:11 AM  Urgency: Elective    Airway not difficult    General Information and Staff    Patient location during procedure: OR  Anesthesiologist: Festus Fried MD  Performed: anesthesiologist     Indications and Patient Condition

## 2020-02-03 NOTE — BRIEF OP NOTE
Pre-Operative Diagnosis: Thyroid nodule [E04.1]     Post-Operative Diagnosis: Thyroid nodule [E04.1]      Procedure Performed:   Procedure(s):  left alfred-thyroidectomy    Surgeon(s) and Role:     * Nivia Payan MD - Primary     * Ralph Arriaga MD    A

## 2020-02-03 NOTE — H&P
She was undergoing a pulmonary test and noted to have a nodule on the left side of her thyroid. Ultrasound shows small nodules on the right but a 3 cm plus nodule on the left side.   Ultrasound-guided needle biopsy was performed which demonstrates a follic Unspecified essential hypertension         Social History:  Social History    Tobacco Use      Smoking status: Current Every Day Smoker        Packs/day: 0.50        Years: 50.00        Pack years: 25        Types: Cigarettes      Smokeless tobacco: Never has a left-sided thyroid nodule with a needle biopsy consistent with follicular neoplasm with atypia. I discussed the findings with her today. She also has some small nodules on the right side.   We discussed management strategies including observation, r

## 2020-02-03 NOTE — INTERVAL H&P NOTE
Pre-op Diagnosis: Thyroid nodule [E04.1]    The above referenced H&P was reviewed by Phyllis Ruiz. Nova Headley MD on 2/3/2020, the patient was examined and no significant changes have occurred in the patient's condition since the H&P was performed.   I discussed with

## 2020-02-03 NOTE — ANESTHESIA POSTPROCEDURE EVALUATION
Patient: Ledy Martínez    Procedure Summary     Date:  02/03/20 Room / Location:  [facility] OR  / [facility] OR    Anesthesia Start:  1003 Anesthesia Stop:  5256    Procedure:  THYROIDECTOMY (Left ) Diagnosis:       Thyroid nodule      (Thyroid nodule [E

## 2020-02-03 NOTE — ANESTHESIA PREPROCEDURE EVALUATION
Anesthesia PreOp Note    HPI:     Nate Deluca is a 81year old female who presents for preoperative consultation requested by: Cassie Golden MD    Date of Surgery: 2/3/2020    Procedure(s):  THYROIDECTOMY  Indication: Thyroid nodule [E04.1]    Relev Potassium Chloride ER 10 MEQ Oral Tab CR, Take 2 tablets (20 mEq total) by mouth daily. , Disp: 2 tablet, Rfl: 0, Past Week at Unknown time  Umeclidinium Bromide (INCRUSE ELLIPTA) 62.5 MCG/INH Inhalation Aerosol Powder, Breath Activated, Inhale 1 puff Comment:Hives    Family History   Problem Relation Age of Onset   • Diabetes Father    • Cancer Mother 76        renal cancer   • Hypertension Mother    • Diabetes Paternal Grandmother    • Cancer Sister         cervical cancer   • Prostate Cancer Brother Concerns:         Service: Not Asked        Blood Transfusions: Not Asked        Caffeine Concern: Yes          coffee        Occupational Exposure: Not Asked        Hobby Hazards: Not Asked        Sleep Concern: Not Asked        Stress Concern: No - normal exam   (+) COPD moderate,   Cardiovascular - normal exam  (+) hypertension,     Neuro/Psych    (+)  anxiety/panic attacks,  depression,       GI/Hepatic/Renal - negative ROS     Endo/Other - negative ROS   Abdominal  - normal exam               An

## 2020-02-04 ENCOUNTER — TELEPHONE (OUTPATIENT)
Dept: OTOLARYNGOLOGY | Facility: CLINIC | Age: 70
End: 2020-02-04

## 2020-02-04 NOTE — TELEPHONE ENCOUNTER
Pt is post op day 1 left hemithyroidectomy. Per pt ding well. Pressure dressing dry and intact, no bleeding or fever. Pt taking OTC tylenol as pt is not using norco, advised pt not to use NSAID for pain.  Pt will remove pressure dressing on Thursday as advi

## 2020-02-04 NOTE — OPERATIVE REPORT
[facility]    PATIENT'S NAME: Jeannette CORBIN   ATTENDING PHYSICIAN: Leonel Terrazas MD   OPERATING PHYSICIAN: Leonel Terrazas MD   PATIENT ACCOUNT#:   [id number]    LOCATION:  [address]  MEDICAL RECORD #:   [id number]       DATE OF BI suture in a running subcuticular fashion. Wound was dressed with Mastisol, Steri-Strips, and a pressure dressing. The patient was then awakened and taken from the operating room in stable condition. Dictated By Inga Stringer MD  d: 02/04/2020 08:55:

## 2020-02-05 ENCOUNTER — TELEPHONE (OUTPATIENT)
Dept: OTOLARYNGOLOGY | Facility: CLINIC | Age: 70
End: 2020-02-05

## 2020-02-05 NOTE — TELEPHONE ENCOUNTER
Pt asking is it okay for her skin to be changing colors.  Pt states right side of incision is turning reddish, pinkish and surgery was on left please advise

## 2020-02-05 NOTE — TELEPHONE ENCOUNTER
Dr Keeley Lloyd patient stated a day after surgery noticed redness to right side of neck and today the redness is going down to her chest,stated is not swollen or painful,the surgical site on the left is fine,please advise.

## 2020-02-06 NOTE — TELEPHONE ENCOUNTER
Informed patient of note below ,stated she is feeling much better today probably from the tape,very slight swelling,no pain,advised pt to call anytime for any concerns,pt verbalized understanding.

## 2020-02-06 NOTE — TELEPHONE ENCOUNTER
She could have some bruising and maybe a little bit of blood underneath the skin. Unless she is having a lot of pain or swelling I would not worry too much about it.   If she is concerned she can come to the office we can evaluate it

## 2020-02-11 ENCOUNTER — OFFICE VISIT (OUTPATIENT)
Dept: OTOLARYNGOLOGY | Facility: CLINIC | Age: 70
End: 2020-02-11
Payer: MEDICARE

## 2020-02-11 VITALS
DIASTOLIC BLOOD PRESSURE: 89 MMHG | TEMPERATURE: 98 F | SYSTOLIC BLOOD PRESSURE: 151 MMHG | HEIGHT: 67 IN | WEIGHT: 194 LBS | BODY MASS INDEX: 30.45 KG/M2

## 2020-02-11 DIAGNOSIS — E04.1 THYROID NODULE: Primary | ICD-10-CM

## 2020-02-11 PROCEDURE — G0463 HOSPITAL OUTPT CLINIC VISIT: HCPCS | Performed by: OTOLARYNGOLOGY

## 2020-02-11 PROCEDURE — 99024 POSTOP FOLLOW-UP VISIT: CPT | Performed by: OTOLARYNGOLOGY

## 2020-02-11 NOTE — PROGRESS NOTES
She is in follow-up of a left hemithyroidectomy. 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 Exam:  Suture line is healing well. Sutures removed today. Voice is excellent.     Assessment/plan:  Doing very well following her hemithy

## 2020-02-17 ENCOUNTER — TELEPHONE (OUTPATIENT)
Dept: OTHER | Age: 70
End: 2020-02-17

## 2020-02-17 NOTE — TELEPHONE ENCOUNTER
Requesting letter for [employer]  Missed 2 summons for jury duty due to health problems. Had doctor appointment and testing and needle guided biopsy on one date for jury duty, and then left thyroidectomy in February.   [employer] told patient just

## 2020-03-03 ENCOUNTER — APPOINTMENT (OUTPATIENT)
Dept: LAB | Age: 70
End: 2020-03-03
Attending: OTOLARYNGOLOGY
Payer: MEDICARE

## 2020-03-03 DIAGNOSIS — E04.1 THYROID NODULE: ICD-10-CM

## 2020-03-03 LAB — TSI SER-ACNC: 2.75 MIU/ML (ref 0.36–3.74)

## 2020-03-03 PROCEDURE — 36415 COLL VENOUS BLD VENIPUNCTURE: CPT

## 2020-03-03 PROCEDURE — 84443 ASSAY THYROID STIM HORMONE: CPT

## 2020-03-30 RX ORDER — NICOTINE 21 MG/24HR
1 PATCH, TRANSDERMAL 24 HOURS TRANSDERMAL EVERY 24 HOURS
Qty: 30 PATCH | Refills: 0 | Status: SHIPPED | OUTPATIENT
Start: 2020-03-30 | End: 2020-08-30

## 2020-03-30 RX ORDER — METOPROLOL TARTRATE 50 MG/1
50 TABLET, FILM COATED ORAL 2 TIMES DAILY
Qty: 180 TABLET | Refills: 0 | Status: SHIPPED | OUTPATIENT
Start: 2020-03-30 | End: 2020-04-07

## 2020-03-30 RX ORDER — TRIAMTERENE AND HYDROCHLOROTHIAZIDE 37.5; 25 MG/1; MG/1
1 TABLET ORAL
Qty: 90 TABLET | Refills: 1 | Status: SHIPPED | OUTPATIENT
Start: 2020-03-30 | End: 2020-08-19

## 2020-03-30 RX ORDER — METOPROLOL TARTRATE 50 MG/1
50 TABLET, FILM COATED ORAL 2 TIMES DAILY
Qty: 180 TABLET | Refills: 0 | Status: SHIPPED | OUTPATIENT
Start: 2020-03-30 | End: 2020-05-06

## 2020-04-03 ENCOUNTER — APPOINTMENT (OUTPATIENT)
Dept: GENERAL RADIOLOGY | Facility: HOSPITAL | Age: 70
End: 2020-04-03
Attending: EMERGENCY MEDICINE
Payer: MEDICARE

## 2020-04-03 ENCOUNTER — HOSPITAL ENCOUNTER (EMERGENCY)
Facility: HOSPITAL | Age: 70
Discharge: HOME | End: 2020-04-03
Attending: EMERGENCY MEDICINE
Payer: MEDICARE

## 2020-04-03 VITALS
WEIGHT: 190 LBS | DIASTOLIC BLOOD PRESSURE: 84 MMHG | BODY MASS INDEX: 29.82 KG/M2 | HEIGHT: 67 IN | HEART RATE: 56 BPM | SYSTOLIC BLOOD PRESSURE: 151 MMHG | RESPIRATION RATE: 18 BRPM | OXYGEN SATURATION: 97 % | TEMPERATURE: 98 F

## 2020-04-03 DIAGNOSIS — S52.92XA CLOSED FRACTURE OF BOTH RADII, INITIAL ENCOUNTER: ICD-10-CM
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
## 2020-04-03 DIAGNOSIS — S52.91XA CLOSED FRACTURE OF BOTH RADII, INITIAL ENCOUNTER: ICD-10-CM

## 2020-04-03 DIAGNOSIS — T07.XXXA ABRASIONS OF MULTIPLE SITES: ICD-10-CM

## 2020-04-03 PROCEDURE — 73080 X-RAY EXAM OF ELBOW: CPT | Performed by: EMERGENCY MEDICINE

## 2020-04-03 PROCEDURE — 73090 X-RAY EXAM OF FOREARM: CPT | Performed by: EMERGENCY MEDICINE

## 2020-04-03 PROCEDURE — 90471 IMMUNIZATION ADMIN: CPT

## 2020-04-03 PROCEDURE — 99284 EMERGENCY DEPT VISIT MOD MDM: CPT

## 2020-04-03 PROCEDURE — 73560 X-RAY EXAM OF KNEE 1 OR 2: CPT | Performed by: EMERGENCY MEDICINE

## 2020-04-03 RX ORDER — TRAMADOL HYDROCHLORIDE 50 MG/1
50 TABLET ORAL EVERY 8 HOURS PRN
Qty: 15 TABLET | Refills: 0 | Status: SHIPPED | OUTPATIENT
Start: 2020-04-03 | End: 2020-04-08

## 2020-04-03 RX ORDER — TRAMADOL HYDROCHLORIDE 50 MG/1
50 TABLET ORAL ONCE
Status: COMPLETED | OUTPATIENT
Start: 2020-04-03 | End: 2020-04-03

## 2020-04-03 NOTE — ED INITIAL ASSESSMENT (HPI)
Pt from home, presents after mechanical fall from standing position, admitted to having 2 alcoholic beverages PTA. Denies hitting head or LOC. C/o bilateral forearm pain + right knee pain. CMS intact.  Alert, oriented and verbal

## 2020-04-04 NOTE — ED PROVIDER NOTES
Patient Seen in: [facility] AND [facility] Emergency Department    History   Patient presents with:  Fall    Stated Complaint: Fall    HPI    61-year-old female with past medical history of COPD, hypertension presenting for evaluation status post mechanical fall a MCG/INH Inhalation Aerosol Powder, Breath Activated,  Inhale 1 puff into the lungs daily. Budesonide-Formoterol Fumarate (SYMBICORT) 160-4.5 MCG/ACT Inhalation Aerosol,  Inhale 2 puffs into the lungs 2 (two) times daily.    Albuterol Sulfate HFA (PROVENTI 98 °F (36.7 °C)   Resp 19   Ht 170.2 cm (5' 7\")   Wt 86.2 kg   LMP 11/03/2005   SpO2 98%   BMI 29.76 kg/m²         Physical Exam   Constitutional: No distress. HEENT: MMM. Head: Normocephalic. Atraumatic. Eyes: No injection.    Neck: No midline C-spin Left (cpt=73080)    Result Date: 4/3/2020  PROCEDURE: XR ELBOW, COMPLETE (MIN 3 VIEWS), LEFT (CPT=73080)  COMPARISON: [facility], XR FOREARM (2 VIEWS), LEFT (CPT=73090), 4/03/2020, 7:20 PM.  INDICATIONS: Left elbow pain post fall today.   TE impacted fracture of the radial neck. Consider dedicated radiographs of the left elbow for greater sensitivity.     Dictated by (CST): Sarahy Webster MD on 4/03/2020 at 7:40 PM     Finalized by (CST): Sarahy Webster MD on 4/03/2020 at 7:42 PM at minimum a facemask and eye protection throughout this encounter with handwashing performed prior and after patient evaluation without personal hand/facial/oropharyngeal contact and gloves worn throughout encounter.  See note and/or contact this provider

## 2020-04-06 ENCOUNTER — TELEPHONE (OUTPATIENT)
Dept: INTERNAL MEDICINE CLINIC | Facility: CLINIC | Age: 70
End: 2020-04-06

## 2020-04-06 ENCOUNTER — TELEPHONE (OUTPATIENT)
Dept: ORTHOPEDICS CLINIC | Facility: CLINIC | Age: 70
End: 2020-04-06

## 2020-04-06 NOTE — TELEPHONE ENCOUNTER
Patient called stating she tripped and fell inside her apartment last week. She went to ER and was diagnosed with fractures in bilateral forearms, close to her elbow. Per patient she was advised to follow up with Dr. Jory Jackson due to her fractures.  Per bogdan

## 2020-04-06 NOTE — TELEPHONE ENCOUNTER
S/w pt and she states she had a fall on 4/3. Pt went to ER and was told both arm fx. Pt was given two slings, but she states one didn't work so she is only using sling for the left arm. Pt rates her pain 10/10 intermittent. Pt denies any swelling.  appt mad

## 2020-04-06 NOTE — TELEPHONE ENCOUNTER
Pt went to [facility] emergency room 4-3-20. Pt has both forearm fractures. Pt is in a lot of pain. Pt called the doctor pt was referred to but not in the office until 4-8-20. Pt requesting a sooner appointment with Dr. Michelle Currie or Dr. Debbie Peterson.

## 2020-04-06 NOTE — TELEPHONE ENCOUNTER
Spoke with patient and she is trying to get appt to see ortho, states they are supposed to call her and le her know today, informed her to let us know if she does not hear from them.

## 2020-04-07 ENCOUNTER — TELEPHONE (OUTPATIENT)
Dept: ORTHOPEDICS CLINIC | Facility: CLINIC | Age: 70
End: 2020-04-07

## 2020-04-07 ENCOUNTER — HOSPITAL ENCOUNTER (OUTPATIENT)
Dept: GENERAL RADIOLOGY | Facility: HOSPITAL | Age: 70
Discharge: HOME | End: 2020-04-07
Attending: PHYSICIAN ASSISTANT
Payer: MEDICARE

## 2020-04-07 ENCOUNTER — OFFICE VISIT (OUTPATIENT)
Dept: ORTHOPEDICS CLINIC | Facility: CLINIC | Age: 70
End: 2020-04-07
Payer: MEDICARE

## 2020-04-07 DIAGNOSIS — S52.125A CLOSED NONDISPLACED FRACTURE OF HEAD OF LEFT RADIUS, INITIAL ENCOUNTER: ICD-10-CM

## 2020-04-07 DIAGNOSIS — S52.122A CLOSED DISPLACED FRACTURE OF HEAD OF LEFT RADIUS, INITIAL ENCOUNTER: Primary | ICD-10-CM

## 2020-04-07 DIAGNOSIS — S52.124A CLOSED NONDISPLACED FRACTURE OF HEAD OF RIGHT RADIUS, INITIAL ENCOUNTER: ICD-10-CM

## 2020-04-07 PROCEDURE — 99204 OFFICE O/P NEW MOD 45 MIN: CPT | Performed by: ORTHOPAEDIC SURGERY

## 2020-04-07 PROCEDURE — 73080 X-RAY EXAM OF ELBOW: CPT | Performed by: PHYSICIAN ASSISTANT

## 2020-04-07 PROCEDURE — 24650 CLTX RDL HEAD/NCK FX WO MNPJ: CPT | Performed by: ORTHOPAEDIC SURGERY

## 2020-04-07 NOTE — TELEPHONE ENCOUNTER
S/w Dr. Dori Perrin that pt will be scheduled for sx on 4/9 for left elbow ORIF. She states pt has preop in Jan 2020 for her thyroid sx and does not need another clearance unless Dr. Lulu Ventura wants it then she can see pt on 4/8/20.  Informed Dr. Collette Chatterjee and he i

## 2020-04-07 NOTE — H&P (VIEW-ONLY)
NURSING INTAKE COMMENTS: Patient presents with:  Consult: pt was seen in the ED on 4/3/2020 after suffering a fall, injury to both arms, left arm is worse, xrays done in ED on 4/3/2020, c/o 10/10 left forearm and swelling left forearm, pt denies numbness, medication and operating macinery or driving - it may make you drowsy. ). 15 tablet 0   • METOPROLOL TARTRATE 50 MG Oral Tab Take 1 tablet (50 mg total) by mouth 2 (two) times daily.  180 tablet 0   • Triamterene-HCTZ 37.5-25 MG Oral Tab Take 1 tablet by kalina Packs/day: 0.25        Years: 50.00        Pack years: 12.5        Types: Cigarettes      Smokeless tobacco: Never Used    Substance and Sexual Activity      Alcohol use:  Yes        Alcohol/week: 1.0 standard drinks        Types: 1 Standard drinks or equi pronation. Neurological: Normal motor and sensory function to the hand and fingers.     Imaging: X-rays from 4/3/2020 of both elbows as well as repeat x-rays of the left elbow today show the right radial neck fracture to be in acceptable alignment but the (CST): Erna Gonzales MD on 4/07/2020 at 10:51 AM     Finalized by (CST): Erna Gonzales MD on 4/07/2020 at 10:53 AM          Xr Elbow, Complete (min 3 Views), Left (cpt=73080)    Result Date: 4/3/2020  PROCEDURE: XR ELBOW, COMPLETE (MIN 3 VIEWS), LEFT (CPT=73 EFFUSION: The elbow is suboptimally assessed due to patient positioning on the lateral view. OTHER: Negative. CONCLUSION:  Question of a transverse impacted fracture of the radial neck.   Consider dedicated radiographs of the left elbow for greater (CPT=73080); Future        Assessment: Left and right radial neck fractures with greater angulation on the left. Plan: I discussed with the right arm she could just use the sling for comfort and work on gentle range of motion.   Told her not to push pull

## 2020-04-07 NOTE — H&P
NURSING INTAKE COMMENTS: Patient presents with:  Consult: pt was seen in the ED on 4/3/2020 after suffering a fall, injury to both arms, left arm is worse, xrays done in ED on 4/3/2020, c/o 10/10 left forearm and swelling left forearm, pt denies numbness, medication and operating macinery or driving - it may make you drowsy. ). 15 tablet 0   • METOPROLOL TARTRATE 50 MG Oral Tab Take 1 tablet (50 mg total) by mouth 2 (two) times daily.  180 tablet 0   • Triamterene-HCTZ 37.5-25 MG Oral Tab Take 1 tablet by kalina Packs/day: 0.25        Years: 50.00        Pack years: 12.5        Types: Cigarettes      Smokeless tobacco: Never Used    Substance and Sexual Activity      Alcohol use:  Yes        Alcohol/week: 1.0 standard drinks        Types: 1 Standard drinks or equi pronation. Neurological: Normal motor and sensory function to the hand and fingers.     Imaging: X-rays from 4/3/2020 of both elbows as well as repeat x-rays of the left elbow today show the right radial neck fracture to be in acceptable alignment but the (CST): Charlie Corcoran MD on 4/07/2020 at 10:51 AM     Finalized by (CST): Charlie Corcoran MD on 4/07/2020 at 10:53 AM          Xr Elbow, Complete (min 3 Views), Left (cpt=73080)    Result Date: 4/3/2020  PROCEDURE: XR ELBOW, COMPLETE (MIN 3 VIEWS), LEFT (CPT=73 EFFUSION: The elbow is suboptimally assessed due to patient positioning on the lateral view. OTHER: Negative. CONCLUSION:  Question of a transverse impacted fracture of the radial neck.   Consider dedicated radiographs of the left elbow for greater (CPT=73080); Future        Assessment: Left and right radial neck fractures with greater angulation on the left. Plan: I discussed with the right arm she could just use the sling for comfort and work on gentle range of motion.   Told her not to push pull

## 2020-04-08 ENCOUNTER — ANESTHESIA EVENT (OUTPATIENT)
Dept: SURGERY | Facility: HOSPITAL | Age: 70
End: 2020-04-08
Payer: MEDICARE

## 2020-04-08 NOTE — H&P
NURSING INTAKE COMMENTS: No chief complaint on file. HPI: This 81year old female presents today with bilateral radial neck fractures at the elbows. She fell at home on 4/3/2020. It was a simple mechanical trip.   She did not lose consciousness and d MG Oral Tab Take 1 tablet by mouth once daily. 90 tablet 1   • nicotine 21 MG/24HR Transdermal Patch 24 Hr Place 1 patch onto the skin daily.  30 patch 0   • Umeclidinium Bromide (INCRUSE ELLIPTA) 62.5 MCG/INH Inhalation Aerosol Powder, Breath Activated Inh feels generally well, no significant weight loss or weight gain  SKIN: no ulcerated or worrisome skin lesions  EYES:denies blurred vision or double vision  HEENT: denies new nasal congestion, sinus pain or ST  LUNGS: denies shortness of breath  CARDIOVASCU fracture. Xr Knee (1 Or 2 Views), Right (cpt=73560)    Result Date: 4/3/2020  PROCEDURE: XR KNEE (1 OR 2 VIEWS), RIGHT (CPT=73560)  COMPARISON: None. INDICATIONS: Right anterior knee pain and bruising post fall today.   TECHNIQUE: 2 views were obtaine TECHNIQUE: 3 views were obtained. FINDINGS:  LIMITATIONS: The examination is limited due to suboptimal patient positioning.  BONES: A transverse fracture is again noted at the junction of the radial head and neck with impaction resulting in ulnar and ant Xr Forearm (2 Views), Right (cpt=73090)    Result Date: 4/3/2020  PROCEDURE: XR FOREARM (2 VIEWS), RIGHT (CPT=73090)  COMPARISON: [facility], XR FOREARM (2 VIEWS), LEFT (CPT=73090), 4/03/2020, 7:20 PM.  INDICATIONS: Bilateral forearm pa attack, stroke, bleeding, infection, failure of fixation, failure of union, posttraumatic arthritis, persistent stiffness, persistent pain, injury to the nerves or arteries, and blood clots. Told no guarantees can be given.   I told her at the present stat

## 2020-04-09 ENCOUNTER — ANESTHESIA (OUTPATIENT)
Dept: SURGERY | Facility: HOSPITAL | Age: 70
End: 2020-04-09
Payer: MEDICARE

## 2020-04-09 ENCOUNTER — APPOINTMENT (OUTPATIENT)
Dept: GENERAL RADIOLOGY | Facility: HOSPITAL | Age: 70
End: 2020-04-09
Attending: ORTHOPAEDIC SURGERY
Payer: MEDICARE

## 2020-04-09 ENCOUNTER — HOSPITAL ENCOUNTER (OUTPATIENT)
Facility: HOSPITAL | Age: 70
Setting detail: HOSPITAL OUTPATIENT SURGERY
Discharge: HOME | End: 2020-04-09
Attending: ORTHOPAEDIC SURGERY | Admitting: ORTHOPAEDIC SURGERY
Payer: MEDICARE

## 2020-04-09 VITALS
OXYGEN SATURATION: 97 % | DIASTOLIC BLOOD PRESSURE: 91 MMHG | WEIGHT: 200 LBS | HEIGHT: 68 IN | SYSTOLIC BLOOD PRESSURE: 153 MMHG | TEMPERATURE: 97 F | HEART RATE: 47 BPM | BODY MASS INDEX: 30.31 KG/M2 | RESPIRATION RATE: 14 BRPM

## 2020-04-09 PROBLEM — S52.132D: Status: ACTIVE | Noted: 2020-04-09

## 2020-04-09 PROBLEM — S52.132A CLOSED DISPLACED FRACTURE OF NECK OF LEFT RADIUS: Status: ACTIVE | Noted: 2020-04-09

## 2020-04-09 PROBLEM — S52.134D CLOSED NONDISPLACED FRACTURE OF NECK OF RIGHT RADIUS WITH ROUTINE HEALING: Status: ACTIVE | Noted: 2020-04-09

## 2020-04-09 PROCEDURE — 3E0T3BZ INTRODUCTION OF ANESTHETIC AGENT INTO PERIPHERAL NERVES AND PLEXI, PERCUTANEOUS APPROACH: ICD-10-PCS | Performed by: ANESTHESIOLOGY

## 2020-04-09 PROCEDURE — 0PSJ04Z REPOSITION LEFT RADIUS WITH INTERNAL FIXATION DEVICE, OPEN APPROACH: ICD-10-PCS | Performed by: ORTHOPAEDIC SURGERY

## 2020-04-09 PROCEDURE — 76000 FLUOROSCOPY <1 HR PHYS/QHP: CPT | Performed by: ORTHOPAEDIC SURGERY

## 2020-04-09 PROCEDURE — 64415 NJX AA&/STRD BRCH PLXS IMG: CPT | Performed by: ORTHOPAEDIC SURGERY

## 2020-04-09 PROCEDURE — 76942 ECHO GUIDE FOR BIOPSY: CPT | Performed by: ORTHOPAEDIC SURGERY

## 2020-04-09 PROCEDURE — 94010 BREATHING CAPACITY TEST: CPT | Performed by: ORTHOPAEDIC SURGERY

## 2020-04-09 PROCEDURE — 99152 MOD SED SAME PHYS/QHP 5/>YRS: CPT | Performed by: ORTHOPAEDIC SURGERY

## 2020-04-09 DEVICE — IMPLANTABLE DEVICE: Type: IMPLANTABLE DEVICE | Site: ELBOW | Status: FUNCTIONAL

## 2020-04-09 RX ORDER — MORPHINE SULFATE 4 MG/ML
4 INJECTION, SOLUTION INTRAMUSCULAR; INTRAVENOUS EVERY 10 MIN PRN
Status: DISCONTINUED | OUTPATIENT
Start: 2020-04-09 | End: 2020-04-09

## 2020-04-09 RX ORDER — ONDANSETRON 2 MG/ML
4 INJECTION INTRAMUSCULAR; INTRAVENOUS ONCE AS NEEDED
Status: DISCONTINUED | OUTPATIENT
Start: 2020-04-09 | End: 2020-04-09

## 2020-04-09 RX ORDER — METOCLOPRAMIDE 10 MG/1
10 TABLET ORAL ONCE
Status: COMPLETED | OUTPATIENT
Start: 2020-04-09 | End: 2020-04-09

## 2020-04-09 RX ORDER — MORPHINE SULFATE 4 MG/ML
2 INJECTION, SOLUTION INTRAMUSCULAR; INTRAVENOUS EVERY 10 MIN PRN
Status: DISCONTINUED | OUTPATIENT
Start: 2020-04-09 | End: 2020-04-09

## 2020-04-09 RX ORDER — FAMOTIDINE 20 MG/1
20 TABLET ORAL ONCE
Status: COMPLETED | OUTPATIENT
Start: 2020-04-09 | End: 2020-04-09

## 2020-04-09 RX ORDER — DEXAMETHASONE SODIUM PHOSPHATE 10 MG/ML
INJECTION, SOLUTION INTRAMUSCULAR; INTRAVENOUS AS NEEDED
Status: DISCONTINUED | OUTPATIENT
Start: 2020-04-09 | End: 2020-04-09 | Stop reason: SURG

## 2020-04-09 RX ORDER — CEFAZOLIN SODIUM/WATER 2 G/20 ML
SYRINGE (ML) INTRAVENOUS AS NEEDED
Status: DISCONTINUED | OUTPATIENT
Start: 2020-04-09 | End: 2020-04-09 | Stop reason: SURG

## 2020-04-09 RX ORDER — ROPIVACAINE HYDROCHLORIDE 5 MG/ML
INJECTION, SOLUTION EPIDURAL; INFILTRATION; PERINEURAL AS NEEDED
Status: DISCONTINUED | OUTPATIENT
Start: 2020-04-09 | End: 2020-04-09 | Stop reason: SURG

## 2020-04-09 RX ORDER — PROCHLORPERAZINE EDISYLATE 5 MG/ML
5 INJECTION INTRAMUSCULAR; INTRAVENOUS ONCE AS NEEDED
Status: DISCONTINUED | OUTPATIENT
Start: 2020-04-09 | End: 2020-04-09

## 2020-04-09 RX ORDER — MIDAZOLAM HYDROCHLORIDE 1 MG/ML
INJECTION INTRAMUSCULAR; INTRAVENOUS AS NEEDED
Status: DISCONTINUED | OUTPATIENT
Start: 2020-04-09 | End: 2020-04-09 | Stop reason: SURG

## 2020-04-09 RX ORDER — ONDANSETRON 2 MG/ML
4 INJECTION INTRAMUSCULAR; INTRAVENOUS EVERY 6 HOURS PRN
Status: CANCELLED | OUTPATIENT
Start: 2020-04-09

## 2020-04-09 RX ORDER — HYDROMORPHONE HYDROCHLORIDE 1 MG/ML
0.4 INJECTION, SOLUTION INTRAMUSCULAR; INTRAVENOUS; SUBCUTANEOUS EVERY 5 MIN PRN
Status: DISCONTINUED | OUTPATIENT
Start: 2020-04-09 | End: 2020-04-09

## 2020-04-09 RX ORDER — METOPROLOL TARTRATE 5 MG/5ML
2.5 INJECTION INTRAVENOUS ONCE
Status: DISCONTINUED | OUTPATIENT
Start: 2020-04-09 | End: 2020-04-09

## 2020-04-09 RX ORDER — LIDOCAINE HYDROCHLORIDE 10 MG/ML
INJECTION, SOLUTION EPIDURAL; INFILTRATION; INTRACAUDAL; PERINEURAL AS NEEDED
Status: DISCONTINUED | OUTPATIENT
Start: 2020-04-09 | End: 2020-04-09 | Stop reason: SURG

## 2020-04-09 RX ORDER — SODIUM CHLORIDE, SODIUM LACTATE, POTASSIUM CHLORIDE, CALCIUM CHLORIDE 600; 310; 30; 20 MG/100ML; MG/100ML; MG/100ML; MG/100ML
INJECTION, SOLUTION INTRAVENOUS CONTINUOUS
Status: DISCONTINUED | OUTPATIENT
Start: 2020-04-09 | End: 2020-04-09

## 2020-04-09 RX ORDER — CYANOCOBALAMIN (VITAMIN B-12) 1000 MCG
1 TABLET, EXTENDED RELEASE ORAL 2 TIMES DAILY WITH MEALS
Qty: 60 TABLET | Refills: 0 | Status: SHIPPED | OUTPATIENT
Start: 2020-04-09 | End: 2020-05-06

## 2020-04-09 RX ORDER — ONDANSETRON 2 MG/ML
INJECTION INTRAMUSCULAR; INTRAVENOUS AS NEEDED
Status: DISCONTINUED | OUTPATIENT
Start: 2020-04-09 | End: 2020-04-09 | Stop reason: SURG

## 2020-04-09 RX ORDER — CEFAZOLIN SODIUM/WATER 2 G/20 ML
2 SYRINGE (ML) INTRAVENOUS ONCE
Status: DISCONTINUED | OUTPATIENT
Start: 2020-04-09 | End: 2020-04-09 | Stop reason: HOSPADM

## 2020-04-09 RX ORDER — ACETAMINOPHEN 500 MG
500 TABLET ORAL EVERY 6 HOURS PRN
COMMUNITY
End: 2020-08-19

## 2020-04-09 RX ORDER — TRAMADOL HYDROCHLORIDE 50 MG/1
50 TABLET ORAL EVERY 6 HOURS PRN
Qty: 20 TABLET | Refills: 0 | Status: SHIPPED | OUTPATIENT
Start: 2020-04-09 | End: 2020-05-06 | Stop reason: ALTCHOICE

## 2020-04-09 RX ORDER — HALOPERIDOL 5 MG/ML
0.25 INJECTION INTRAMUSCULAR ONCE AS NEEDED
Status: DISCONTINUED | OUTPATIENT
Start: 2020-04-09 | End: 2020-04-09

## 2020-04-09 RX ORDER — DEXAMETHASONE SODIUM PHOSPHATE 4 MG/ML
VIAL (ML) INJECTION AS NEEDED
Status: DISCONTINUED | OUTPATIENT
Start: 2020-04-09 | End: 2020-04-09 | Stop reason: SURG

## 2020-04-09 RX ORDER — GLYCOPYRROLATE 0.2 MG/ML
INJECTION, SOLUTION INTRAMUSCULAR; INTRAVENOUS AS NEEDED
Status: DISCONTINUED | OUTPATIENT
Start: 2020-04-09 | End: 2020-04-09 | Stop reason: SURG

## 2020-04-09 RX ORDER — MORPHINE SULFATE 10 MG/ML
6 INJECTION, SOLUTION INTRAMUSCULAR; INTRAVENOUS EVERY 10 MIN PRN
Status: DISCONTINUED | OUTPATIENT
Start: 2020-04-09 | End: 2020-04-09

## 2020-04-09 RX ORDER — HYDROCODONE BITARTRATE AND ACETAMINOPHEN 5; 325 MG/1; MG/1
1 TABLET ORAL AS NEEDED
Status: DISCONTINUED | OUTPATIENT
Start: 2020-04-09 | End: 2020-04-09

## 2020-04-09 RX ORDER — HYDROMORPHONE HYDROCHLORIDE 1 MG/ML
0.6 INJECTION, SOLUTION INTRAMUSCULAR; INTRAVENOUS; SUBCUTANEOUS EVERY 5 MIN PRN
Status: DISCONTINUED | OUTPATIENT
Start: 2020-04-09 | End: 2020-04-09

## 2020-04-09 RX ORDER — HYDROCODONE BITARTRATE AND ACETAMINOPHEN 5; 325 MG/1; MG/1
2 TABLET ORAL AS NEEDED
Status: DISCONTINUED | OUTPATIENT
Start: 2020-04-09 | End: 2020-04-09

## 2020-04-09 RX ORDER — HYDROMORPHONE HYDROCHLORIDE 1 MG/ML
0.2 INJECTION, SOLUTION INTRAMUSCULAR; INTRAVENOUS; SUBCUTANEOUS EVERY 5 MIN PRN
Status: DISCONTINUED | OUTPATIENT
Start: 2020-04-09 | End: 2020-04-09

## 2020-04-09 RX ORDER — MULTIVIT-MIN/FA/LYCOPEN/LUTEIN .4-300-25
1 TABLET ORAL DAILY
Qty: 30 TABLET | Refills: 0 | Status: SHIPPED | OUTPATIENT
Start: 2020-04-09 | End: 2020-07-21 | Stop reason: ALTCHOICE

## 2020-04-09 RX ORDER — ACETAMINOPHEN 500 MG
1000 TABLET ORAL ONCE
Status: DISCONTINUED | OUTPATIENT
Start: 2020-04-09 | End: 2020-04-09 | Stop reason: HOSPADM

## 2020-04-09 RX ORDER — NALOXONE HYDROCHLORIDE 0.4 MG/ML
80 INJECTION, SOLUTION INTRAMUSCULAR; INTRAVENOUS; SUBCUTANEOUS AS NEEDED
Status: DISCONTINUED | OUTPATIENT
Start: 2020-04-09 | End: 2020-04-09

## 2020-04-09 RX ADMIN — ONDANSETRON 4 MG: 2 INJECTION INTRAMUSCULAR; INTRAVENOUS at 07:52:00

## 2020-04-09 RX ADMIN — SODIUM CHLORIDE, SODIUM LACTATE, POTASSIUM CHLORIDE, CALCIUM CHLORIDE: 600; 310; 30; 20 INJECTION, SOLUTION INTRAVENOUS at 07:29:00

## 2020-04-09 RX ADMIN — DEXAMETHASONE SODIUM PHOSPHATE 4 MG: 10 INJECTION, SOLUTION INTRAMUSCULAR; INTRAVENOUS at 07:03:00

## 2020-04-09 RX ADMIN — LIDOCAINE HYDROCHLORIDE 50 MG: 10 INJECTION, SOLUTION EPIDURAL; INFILTRATION; INTRACAUDAL; PERINEURAL at 07:35:00

## 2020-04-09 RX ADMIN — MIDAZOLAM HYDROCHLORIDE 2 MG: 1 INJECTION INTRAMUSCULAR; INTRAVENOUS at 06:59:00

## 2020-04-09 RX ADMIN — DEXAMETHASONE SODIUM PHOSPHATE 4 MG: 4 MG/ML VIAL (ML) INJECTION at 07:52:00

## 2020-04-09 RX ADMIN — LIDOCAINE HYDROCHLORIDE 2 ML: 10 INJECTION, SOLUTION EPIDURAL; INFILTRATION; INTRACAUDAL; PERINEURAL at 06:59:00

## 2020-04-09 RX ADMIN — CEFAZOLIN SODIUM/WATER 2 G: 2 G/20 ML SYRINGE (ML) INTRAVENOUS at 07:40:00

## 2020-04-09 RX ADMIN — GLYCOPYRROLATE 0.2 MG: 0.2 INJECTION, SOLUTION INTRAMUSCULAR; INTRAVENOUS at 07:34:00

## 2020-04-09 RX ADMIN — ROPIVACAINE HYDROCHLORIDE 30 ML: 5 INJECTION, SOLUTION EPIDURAL; INFILTRATION; PERINEURAL at 07:03:00

## 2020-04-09 NOTE — OPERATIVE REPORT
[facility]    PATIENT'S NAME: [address] PHYSICIAN: Michael Barnes MD   OPERATING PHYSICIAN: Anel Barnes MD   PATIENT ACCOUNT#:   [id number]    LOCATION:  [facility] PACU 1 [facility] 10  MEDICAL RECORD #:   [id number]       DA room.    Before prepping, I attempted closed reduction under fluoroscopic guidance and was unsuccessful. The left upper extremity was then prepped and draped in sterile fashion. No tourniquet was used during the case.   With the assistant positioning the and the distal radius were closed with 3-0 nylon suture in horizontal interrupted mattress sutures.   Permanent pictures were taken off the fluoroscopy in both AP and lateral planes both proximally and distally, showed good placement of the hardware and goo

## 2020-04-09 NOTE — ANESTHESIA PROCEDURE NOTES
Airway  Urgency: Elective      General Information and Staff    Patient location during procedure: OR  Anesthesiologist: Agnelina Doll MD  Resident/CRNA: Sarita Wood CRNA  Performed: CRNA     Indications and Patient Condition  Indications for airw

## 2020-04-09 NOTE — INTERVAL H&P NOTE
Pre-op Diagnosis: left radial neck fracture    The above referenced H&P was reviewed by Joanne Dupree MD on 4/9/2020, the patient was examined and no significant changes have occurred in the patient's condition since the H&P was performed.   I discussed

## 2020-04-09 NOTE — ANESTHESIA POSTPROCEDURE EVALUATION
Patient: Miguel Crump    Procedure Summary     Date:  04/09/20 Room / Location:  [facility] OR 05 / [facility] OR    Anesthesia Start:  5932 Anesthesia Stop:  8421    Procedure:  ELBOW OPEN REDUCTION INTERNAL FIXATION (Left Elbow) Diagnosis:  (left radial

## 2020-04-09 NOTE — ANESTHESIA PREPROCEDURE EVALUATION
Anesthesia PreOp Note    HPI:     Martina Redd is a 81year old female who presents for preoperative consultation requested by: Arlene Noel MD    Date of Surgery: 4/9/2020    Procedure(s):  ELBOW OPEN REDUCTION INTERNAL FIXATION  Indication: left arm   • OTHER      vein surgery    • THYROID LOBECTOMY,UNILAT  02/03/2020   • THYROIDECTOMY Left 2/3/2020    Performed by Casandra Markham MD at [address] [place] [address] MAIN OR       acetaminophen 500 MG Oral Tab, Take 500 mg by mouth every 6 (six) hours as needed for Pain., D medications on file.         Meloxicam               OTHER (SEE COMMENTS)    Comment:\"affected my kidneys\"  Chantix [Vareniclin*    NAUSEA ONLY    Comment:Other reaction(s): mood swings  Strawberries            RASH    Comment:Hives    Family History   Pr file        Relationship status: Not on file      Intimate partner violence:        Fear of current or ex partner: Not on file        Emotionally abused: Not on file        Physically abused: Not on file        Forced sexual activity: Not on file    Other FB  Neck ROM: full  Dental - normal exam     Pulmonary - normal exam   (+) COPD moderate,   Cardiovascular - normal exam  Exercise tolerance: good  (+) hypertension well controlled,     Neuro/Psych    (+)  anxiety/panic attacks,  depression,       GI/Hepat

## 2020-04-09 NOTE — ANESTHESIA PROCEDURE NOTES
Peripheral Block  Performed by: Sneha Weinstein MD  Authorized by: Sneha Weinstein MD       General Information and Staff    Start Time:  4/9/2020 6:59 AM  End Time:  4/9/2020 7:05 AM  Anesthesiologist:  Sneha Weinstein MD  Performed by:   An

## 2020-04-09 NOTE — INTERVAL H&P NOTE
Pre-op Diagnosis: left radial neck fracture    The above referenced H&P was reviewed by Peri Thomas MD on 4/9/2020, the patient was examined and no significant changes have occurred in the patient's condition since the H&P was performed.   I discussed

## 2020-04-09 NOTE — BRIEF OP NOTE
Pre-Operative Diagnosis: 1) left radial neck fracture, 2) obesity (BMI 30.41)     Post-Operative Diagnosis: Same      Procedure Performed: left elbow open reduction internal fixation radial neck fracture, fluoroscopy    Surgeon(s) and Role: Camilo Rosas

## 2020-04-10 ENCOUNTER — TELEPHONE (OUTPATIENT)
Dept: ORTHOPEDICS CLINIC | Facility: CLINIC | Age: 70
End: 2020-04-10

## 2020-04-10 NOTE — TELEPHONE ENCOUNTER
5 days. I spoke to her this morning by phone. She is doing well with little pain, almost resolved tingling of the finger tips, and no weakness of the hand or fingers. She has a ursula sticking out of the skin I want to check.

## 2020-04-10 NOTE — TELEPHONE ENCOUNTER
S/w pt and appt made on 4/14 @ 1120am w/ RH at [facility].

## 2020-04-14 ENCOUNTER — HOSPITAL ENCOUNTER (OUTPATIENT)
Dept: GENERAL RADIOLOGY | Facility: HOSPITAL | Age: 70
Discharge: HOME | End: 2020-04-14
Attending: ORTHOPAEDIC SURGERY
Payer: MEDICARE

## 2020-04-14 ENCOUNTER — OFFICE VISIT (OUTPATIENT)
Dept: ORTHOPEDICS CLINIC | Facility: CLINIC | Age: 70
End: 2020-04-14
Payer: MEDICARE

## 2020-04-14 DIAGNOSIS — S52.124A CLOSED NONDISPLACED FRACTURE OF HEAD OF RIGHT RADIUS, INITIAL ENCOUNTER: ICD-10-CM

## 2020-04-14 DIAGNOSIS — S52.122A CLOSED DISPLACED FRACTURE OF HEAD OF LEFT RADIUS, INITIAL ENCOUNTER: ICD-10-CM

## 2020-04-14 DIAGNOSIS — S52.122A CLOSED DISPLACED FRACTURE OF HEAD OF LEFT RADIUS, INITIAL ENCOUNTER: Primary | ICD-10-CM

## 2020-04-14 PROCEDURE — 73070 X-RAY EXAM OF ELBOW: CPT | Performed by: ORTHOPAEDIC SURGERY

## 2020-04-14 PROCEDURE — 99024 POSTOP FOLLOW-UP VISIT: CPT | Performed by: ORTHOPAEDIC SURGERY

## 2020-04-14 PROCEDURE — 73090 X-RAY EXAM OF FOREARM: CPT | Performed by: ORTHOPAEDIC SURGERY

## 2020-04-14 PROCEDURE — G0463 HOSPITAL OUTPT CLINIC VISIT: HCPCS | Performed by: ORTHOPAEDIC SURGERY

## 2020-04-14 NOTE — PROGRESS NOTES
NURSING INTAKE COMMENTS: Patient presents with:   Follow - Up: post op left arm, pain at a 5/10 today      HPI: This 81year old female presents today for follow-up with right proximal radial neck fracture and status post left ORIF of a proximal radial neck onto the skin daily. 30 patch 0   • Umeclidinium Bromide (INCRUSE ELLIPTA) 62.5 MCG/INH Inhalation Aerosol Powder, Breath Activated Inhale 1 puff into the lungs daily.  1 each 5   • Budesonide-Formoterol Fumarate (SYMBICORT) 160-4.5 MCG/ACT Inhalation Aeros Binge frequency: Never      Drug use: No      Sexual activity: Not on file       Review of Systems:  GENERAL: feels generally well, no significant weight loss or weight gain  SKIN: no ulcerated or worrisome skin lesions  EYES:denies blurred vision or in proper position. The radial neck fracture is now in near anatomic alignment. No other fractures dislocations.     Xr Knee (1 Or 2 Views), Right (cpt=73560)    Result Date: 4/3/2020  PROCEDURE: XR KNEE (1 OR 2 VIEWS), RIGHT (CPT=73560)  COMPARISON: None FOREARM (2 VIEWS), LEFT (CPT=73090), 4/03/2020, 7:20 PM.  INDICATIONS: Left elbow pain post fall today. TECHNIQUE: 3 views were obtained. FINDINGS:  LIMITATIONS: The examination is limited due to suboptimal patient positioning.  BONES: A transverse fract on 4/03/2020 at 7:40 PM     Finalized by (CST): Jailyn Fontaine MD on 4/03/2020 at 7:42 PM          Xr Forearm (2 Views), Right (cpt=73090)    Result Date: 4/3/2020  PROCEDURE: XR FOREARM (2 VIEWS), RIGHT (CPT=73090)  COMPARISON: [facility] of head of left radius, initial encounter  -     Cancel: XR ELBOW, COMPLETE (MIN 3 VIEWS), LEFT (CPT=73080); Future  -     XR FOREARM (2 VIEWS), LEFT (CPT=73090);  Future    Closed nondisplaced fracture of head of right radius, initial encounter  -     Canc

## 2020-04-28 ENCOUNTER — OFFICE VISIT (OUTPATIENT)
Dept: ORTHOPEDICS CLINIC | Facility: CLINIC | Age: 70
End: 2020-04-28
Payer: MEDICARE

## 2020-04-28 ENCOUNTER — HOSPITAL ENCOUNTER (OUTPATIENT)
Dept: GENERAL RADIOLOGY | Facility: HOSPITAL | Age: 70
Discharge: HOME | End: 2020-04-28
Attending: ORTHOPAEDIC SURGERY
Payer: MEDICARE

## 2020-04-28 DIAGNOSIS — S52.124D CLOSED NONDISPLACED FRACTURE OF HEAD OF RIGHT RADIUS WITH ROUTINE HEALING, SUBSEQUENT ENCOUNTER: ICD-10-CM

## 2020-04-28 DIAGNOSIS — Z47.89 ORTHOPEDIC AFTERCARE: ICD-10-CM

## 2020-04-28 DIAGNOSIS — S52.122D CLOSED DISPLACED FRACTURE OF HEAD OF LEFT RADIUS WITH ROUTINE HEALING, SUBSEQUENT ENCOUNTER: Primary | ICD-10-CM

## 2020-04-28 PROCEDURE — G0463 HOSPITAL OUTPT CLINIC VISIT: HCPCS | Performed by: ORTHOPAEDIC SURGERY

## 2020-04-28 PROCEDURE — 73080 X-RAY EXAM OF ELBOW: CPT | Performed by: ORTHOPAEDIC SURGERY

## 2020-04-28 PROCEDURE — 99024 POSTOP FOLLOW-UP VISIT: CPT | Performed by: ORTHOPAEDIC SURGERY

## 2020-04-28 PROCEDURE — G0463 HOSPITAL OUTPT CLINIC VISIT: HCPCS | Performed by: PHYSICIAN ASSISTANT

## 2020-04-28 PROCEDURE — 73090 X-RAY EXAM OF FOREARM: CPT | Performed by: ORTHOPAEDIC SURGERY

## 2020-04-28 PROCEDURE — 99024 POSTOP FOLLOW-UP VISIT: CPT | Performed by: PHYSICIAN ASSISTANT

## 2020-04-28 NOTE — PROGRESS NOTES
NURSING INTAKE COMMENTS: Patient presents with:  Post-Op: 3 week post-op/ pain on both arms mainly on the right arm at worst 10/10-c/o a lot of pain in the forearm and upper right arm  -repeat xrays of both arms today      HPI: This 81year old female pres times daily with meals. 60 tablet 0   • METOPROLOL TARTRATE 50 MG Oral Tab Take 1 tablet (50 mg total) by mouth 2 (two) times daily. 180 tablet 0   • Triamterene-HCTZ 37.5-25 MG Oral Tab Take 1 tablet by mouth once daily.  90 tablet 1   • nicotine 21 MG/24H 4 or more times a week        Drinks per session: 1 or 2        Binge frequency: Never      Drug use: No      Sexual activity: Not on file       Review of Systems:  GENERAL: feels generally well, no significant weight loss or weight gain  SKIN: no ulcerate acceptable alignment. Xr Knee (1 Or 2 Views), Right (cpt=73560)    Result Date: 4/3/2020  PROCEDURE: XR KNEE (1 OR 2 VIEWS), RIGHT (CPT=73560)  COMPARISON: None. INDICATIONS: Right anterior knee pain and bruising post fall today.   TECHNIQUE: 2 views fall today. TECHNIQUE: 3 views were obtained. FINDINGS:  LIMITATIONS: The examination is limited due to suboptimal patient positioning.  BONES: A transverse fracture is again noted at the junction of the radial head and neck with impaction resulting in u Contusion. TECHNIQUE: 2 views were obtained. FINDINGS:  BONES: There is question of a transverse fracture involving the radial neck with slight impaction, suboptimally assessed on this forearm exam.  There is otherwise no fracture or dislocation.   There FOREARM (2 VIEWS), RIGHT (CPT=73090), 4/03/2020, 7:20 PM.   INDICATIONS: Follow up right elbow radial head/neck fracture. TECHNIQUE: 3 views were obtained. FINDINGS:  BONES: Minimally displaced radial neck fracture with fracture line still depicted.   Ot 01/22/2020    GFRNAA 38 (L) 01/22/2020    GFRAA 44 (L) 01/22/2020        Assessment and Plan:  Diagnoses and all orders for this visit:    Closed displaced fracture of head of left radius with routine healing, subsequent encounter    Orthopedic aftercare

## 2020-04-29 ENCOUNTER — TELEPHONE (OUTPATIENT)
Dept: INTERNAL MEDICINE CLINIC | Facility: CLINIC | Age: 70
End: 2020-04-29

## 2020-04-29 NOTE — TELEPHONE ENCOUNTER
Patient states someone from Dr. Gaby Frazier office contacted her (patient does not know who) to schedule a post surgery follow up with Dr. Jennifer Goetz. Patient states she had left arm surgery 04/09/2020 with Dr. Ines Linares.     Patient wants to know if she can com

## 2020-05-06 ENCOUNTER — OFFICE VISIT (OUTPATIENT)
Dept: INTERNAL MEDICINE CLINIC | Facility: CLINIC | Age: 70
End: 2020-05-06
Payer: MEDICARE

## 2020-05-06 ENCOUNTER — TELEPHONE (OUTPATIENT)
Dept: INTERNAL MEDICINE CLINIC | Facility: CLINIC | Age: 70
End: 2020-05-06

## 2020-05-06 VITALS
SYSTOLIC BLOOD PRESSURE: 123 MMHG | TEMPERATURE: 99 F | HEART RATE: 72 BPM | WEIGHT: 201.38 LBS | BODY MASS INDEX: 30.52 KG/M2 | DIASTOLIC BLOOD PRESSURE: 82 MMHG | HEIGHT: 68 IN

## 2020-05-06 DIAGNOSIS — Z12.31 ENCOUNTER FOR SCREENING MAMMOGRAM FOR BREAST CANCER: ICD-10-CM

## 2020-05-06 DIAGNOSIS — Z12.11 SCREENING FOR COLON CANCER: ICD-10-CM

## 2020-05-06 DIAGNOSIS — S52.132D CLOSED DISPLACED FRACTURE OF NECK OF LEFT RADIUS WITH ROUTINE HEALING, SUBSEQUENT ENCOUNTER: Primary | ICD-10-CM

## 2020-05-06 PROCEDURE — 99214 OFFICE O/P EST MOD 30 MIN: CPT | Performed by: INTERNAL MEDICINE

## 2020-05-06 RX ORDER — CARVEDILOL 3.12 MG/1
3.12 TABLET ORAL 2 TIMES DAILY WITH MEALS
Qty: 180 TABLET | Refills: 0 | Status: SHIPPED | OUTPATIENT
Start: 2020-05-06 | End: 2020-08-05

## 2020-05-06 RX ORDER — CYANOCOBALAMIN (VITAMIN B-12) 1000 MCG
1 TABLET, EXTENDED RELEASE ORAL 2 TIMES DAILY WITH MEALS
Qty: 180 TABLET | Refills: 0 | Status: SHIPPED | OUTPATIENT
Start: 2020-05-06 | End: 2020-07-21 | Stop reason: ALTCHOICE

## 2020-05-06 NOTE — PROGRESS NOTES
HPI:    Patient ID: Rishi Salazar is a 81year old female. HPI She is followed today after she had  open reduction internal fixation of the left radial head fracture.   She is still nonweightbearing on the left arm she has follow-up appointment with [facility] Dispense Refill   • carvedilol 3.125 MG Oral Tab Take 1 tablet (3.125 mg total) by mouth 2 (two) times daily with meals. 180 tablet 0   • Calcium Citrate-Vitamin D 315-250 MG-UNIT Oral Tab Take 1 tablet by mouth 2 (two) times daily with meals.  180 tablet 0 ELBOW OPEN REDUCTION INTERNAL FIXATION Left 4/9/2020    Performed by Kelly Cruz MD at [facility] OR   • [address] Right 8/12/2019    Performed by Will Lentz MD at [facility] OR   • INCISION AND DRAINAGE Right 06/11/2019    sebaceous cyst membranes are not cyanotic. No oropharyngeal exudate, posterior oropharyngeal edema or posterior oropharyngeal erythema. Eyes: Pupils are equal, round, and reactive to light. Conjunctivae and EOM are normal. Right eye exhibits no discharge.  Left eye exhi the same medication check blood pressure at home bring logbook next visit  Screening  for colon cancer referral for GI  Screening for breast cancer - mammogram  No orders of the defined types were placed in this encounter.       Meds This Visit:  Requested

## 2020-05-06 NOTE — TELEPHONE ENCOUNTER
Audi/ Pharmacist is calling regarding patient's medication carvedilol 3.125 MG Oral Tab. Raymon Grover states patient is also prescribed metoprolol and wants to know if okay to dispense medication since both medications are under the same class. Please advise.

## 2020-05-06 NOTE — TELEPHONE ENCOUNTER
Called pharmacy to inform them per 's note that metoprolol was d/c'd    Pharmacist stts he just spoke with Misael Ybarra regarding this and that the same message was given, closing encounter

## 2020-05-19 ENCOUNTER — OFFICE VISIT (OUTPATIENT)
Dept: ORTHOPEDICS CLINIC | Facility: CLINIC | Age: 70
End: 2020-05-19
Payer: MEDICARE

## 2020-05-19 ENCOUNTER — HOSPITAL ENCOUNTER (OUTPATIENT)
Dept: GENERAL RADIOLOGY | Facility: HOSPITAL | Age: 70
Discharge: HOME | End: 2020-05-19
Attending: PHYSICIAN ASSISTANT
Payer: MEDICARE

## 2020-05-19 DIAGNOSIS — S52.124D CLOSED NONDISPLACED FRACTURE OF HEAD OF RIGHT RADIUS WITH ROUTINE HEALING, SUBSEQUENT ENCOUNTER: ICD-10-CM

## 2020-05-19 DIAGNOSIS — Z47.89 ORTHOPEDIC AFTERCARE: Primary | ICD-10-CM

## 2020-05-19 DIAGNOSIS — S52.122D CLOSED DISPLACED FRACTURE OF HEAD OF LEFT RADIUS WITH ROUTINE HEALING, SUBSEQUENT ENCOUNTER: ICD-10-CM

## 2020-05-19 DIAGNOSIS — Z47.89 ORTHOPEDIC AFTERCARE: ICD-10-CM

## 2020-05-19 PROCEDURE — 73080 X-RAY EXAM OF ELBOW: CPT | Performed by: PHYSICIAN ASSISTANT

## 2020-05-19 PROCEDURE — 99024 POSTOP FOLLOW-UP VISIT: CPT | Performed by: ORTHOPAEDIC SURGERY

## 2020-05-19 PROCEDURE — 73090 X-RAY EXAM OF FOREARM: CPT | Performed by: PHYSICIAN ASSISTANT

## 2020-05-19 PROCEDURE — G0463 HOSPITAL OUTPT CLINIC VISIT: HCPCS | Performed by: ORTHOPAEDIC SURGERY

## 2020-05-19 NOTE — PROGRESS NOTES
NURSING INTAKE COMMENTS: Patient presents with:  Post-Op: left elbow open reduction radial neck fx on 4/9/2020, pt denies pain today, fever, chills, or swelling, occasional numbness and tingling      HPI: This 81year old female presents today for follow-u Vitamins-Minerals (CEROVITE SENIOR) Oral Tab Take 1 tablet by mouth daily. 30 tablet 0   • Triamterene-HCTZ 37.5-25 MG Oral Tab Take 1 tablet by mouth once daily.  90 tablet 1   • nicotine 21 MG/24HR Transdermal Patch 24 Hr Place 1 patch onto the skin daily Binge frequency: Never      Drug use: No      Sexual activity: Not on file       Review of Systems:  GENERAL: feels generally well, no significant weight loss or weight gain  SKIN: no ulcerated or worrisome skin lesions  EYES:denies blurred vision or COMPLETE (MIN 3 VIEWS), LEFT (CPT=73080), 4/03/2020, 8:24 PM.  [facility], [facility]. [facility] 2nd Floor, XR ELBOW, COMPLETE (MIN 3 VIEWS), LEFT (CPT=73080), 4/07/2020, 10:01 AM.  [facility], XR FOREARM (2 VIEWS), RIGHT (CPT=73090), 4/0 INDICATIONS: Follow up left elbow radial neck fracture. TECHNIQUE: 2 views were obtained. FINDINGS:  BONES: A transverse fracture is again noted at the junction of the left radial head and neck with slight impaction, unchanged.   The fracture margins cindy left radial head fracture advised patient we will remove the ursula. Patient is overall very wary as the pin site is very sensitive and declined an offer to do this in the office.   We discussed taking patient to the operating room for pin removal under sedati

## 2020-05-20 ENCOUNTER — MED REC SCAN ONLY (OUTPATIENT)
Dept: ORTHOPEDICS CLINIC | Facility: CLINIC | Age: 70
End: 2020-05-20

## 2020-05-26 NOTE — CM/SW NOTE
Patient was screened, possible need for HHC at [place]. CM/SW will follow PT notes for recommendations. RN to contact SW/CM if needs arise.     Nelwyn Meigs RN [facility]  [phone]

## 2020-05-27 NOTE — H&P
HPI: This 81year old female presents today for follow-up with bilateral radial neck fractures occurring on 4/9/2020 status post left ORIF  performed 6 weeks ago. She is overall doing well with regards to both elbows.   She is noting that her pain is impro Patch 24 Hr Place 1 patch onto the skin daily. 30 patch 0   • Umeclidinium Bromide (INCRUSE ELLIPTA) 62.5 MCG/INH Inhalation Aerosol Powder, Breath Activated Inhale 1 puff into the lungs daily. 1 each 5   • Budesonide-Formoterol Fumarate (SYMBICORT) 160-4. worrisome skin lesions  EYES:denies blurred vision or double vision  HEENT: denies new nasal congestion, sinus pain or ST  LUNGS: denies shortness of breath  CARDIOVASCULAR: denies chest pain  GI: no hematemesis, no worsening heartburn, no diarrhea  : no [facility], XR FOREARM (2 VIEWS), RIGHT (CPT=73090), 4/03/2020, 7:20 PM.  [facility] 2nd Floor, XR ELBOW, (2 VIEWS), RIGHT (CPT=73070), 4/14/2020, 11:21 AM.  INDICATIONS: Follow up right elbow radial neck fracture.   TECHNIQUE: 3 view slight impaction, unchanged. The fracture margins appear more ill-defined, although there is no bridging callus formation at this time. A retrograde K-wire again extends from the distal radial metaphysis into the radial head.   There is stable moderate os

## 2020-05-28 DIAGNOSIS — D32.9 MENINGIOMA (HCC): Primary | ICD-10-CM

## 2020-05-29 ENCOUNTER — HOSPITAL ENCOUNTER (OUTPATIENT)
Facility: HOSPITAL | Age: 70
Setting detail: HOSPITAL OUTPATIENT SURGERY
Discharge: HOME | End: 2020-05-29
Attending: ORTHOPAEDIC SURGERY | Admitting: ORTHOPAEDIC SURGERY
Payer: MEDICARE

## 2020-05-29 ENCOUNTER — ANESTHESIA (OUTPATIENT)
Dept: SURGERY | Facility: HOSPITAL | Age: 70
End: 2020-05-29
Payer: MEDICARE

## 2020-05-29 ENCOUNTER — APPOINTMENT (OUTPATIENT)
Dept: GENERAL RADIOLOGY | Facility: HOSPITAL | Age: 70
End: 2020-05-29
Attending: ORTHOPAEDIC SURGERY
Payer: MEDICARE

## 2020-05-29 ENCOUNTER — ANESTHESIA EVENT (OUTPATIENT)
Dept: SURGERY | Facility: HOSPITAL | Age: 70
End: 2020-05-29
Payer: MEDICARE

## 2020-05-29 VITALS
OXYGEN SATURATION: 98 % | HEIGHT: 67 IN | SYSTOLIC BLOOD PRESSURE: 107 MMHG | TEMPERATURE: 98 F | BODY MASS INDEX: 31.23 KG/M2 | WEIGHT: 199 LBS | DIASTOLIC BLOOD PRESSURE: 77 MMHG | RESPIRATION RATE: 16 BRPM | HEART RATE: 57 BPM

## 2020-05-29 PROBLEM — T84.84XA PAINFUL ORTHOPAEDIC HARDWARE: Status: ACTIVE | Noted: 2020-05-29

## 2020-05-29 PROBLEM — T84.84XA PAINFUL ORTHOPAEDIC HARDWARE (HCC): Status: ACTIVE | Noted: 2020-05-29

## 2020-05-29 PROBLEM — Z96.9 RETAINED ORTHOPEDIC HARDWARE: Status: ACTIVE | Noted: 2020-05-29

## 2020-05-29 PROCEDURE — 76000 FLUOROSCOPY <1 HR PHYS/QHP: CPT | Performed by: ORTHOPAEDIC SURGERY

## 2020-05-29 PROCEDURE — 0PPJ34Z REMOVAL OF INTERNAL FIXATION DEVICE FROM LEFT RADIUS, PERCUTANEOUS APPROACH: ICD-10-PCS | Performed by: ORTHOPAEDIC SURGERY

## 2020-05-29 PROCEDURE — 88300 SURGICAL PATH GROSS: CPT | Performed by: ORTHOPAEDIC SURGERY

## 2020-05-29 RX ORDER — HYDROCODONE BITARTRATE AND ACETAMINOPHEN 5; 325 MG/1; MG/1
2 TABLET ORAL AS NEEDED
Status: CANCELLED | OUTPATIENT
Start: 2020-05-29

## 2020-05-29 RX ORDER — HYDROMORPHONE HYDROCHLORIDE 1 MG/ML
0.6 INJECTION, SOLUTION INTRAMUSCULAR; INTRAVENOUS; SUBCUTANEOUS EVERY 5 MIN PRN
Status: CANCELLED | OUTPATIENT
Start: 2020-05-29

## 2020-05-29 RX ORDER — ACETAMINOPHEN 500 MG
1000 TABLET ORAL ONCE
Status: COMPLETED | OUTPATIENT
Start: 2020-05-29 | End: 2020-05-29

## 2020-05-29 RX ORDER — ONDANSETRON 2 MG/ML
4 INJECTION INTRAMUSCULAR; INTRAVENOUS EVERY 6 HOURS PRN
Status: CANCELLED | OUTPATIENT
Start: 2020-05-29

## 2020-05-29 RX ORDER — PROCHLORPERAZINE EDISYLATE 5 MG/ML
5 INJECTION INTRAMUSCULAR; INTRAVENOUS ONCE AS NEEDED
Status: CANCELLED | OUTPATIENT
Start: 2020-05-29 | End: 2020-05-29

## 2020-05-29 RX ORDER — NALOXONE HYDROCHLORIDE 0.4 MG/ML
80 INJECTION, SOLUTION INTRAMUSCULAR; INTRAVENOUS; SUBCUTANEOUS AS NEEDED
Status: CANCELLED | OUTPATIENT
Start: 2020-05-29 | End: 2020-05-29

## 2020-05-29 RX ORDER — SODIUM CHLORIDE, SODIUM LACTATE, POTASSIUM CHLORIDE, CALCIUM CHLORIDE 600; 310; 30; 20 MG/100ML; MG/100ML; MG/100ML; MG/100ML
INJECTION, SOLUTION INTRAVENOUS CONTINUOUS
Status: CANCELLED | OUTPATIENT
Start: 2020-05-29

## 2020-05-29 RX ORDER — MORPHINE SULFATE 4 MG/ML
4 INJECTION, SOLUTION INTRAMUSCULAR; INTRAVENOUS EVERY 10 MIN PRN
Status: CANCELLED | OUTPATIENT
Start: 2020-05-29

## 2020-05-29 RX ORDER — ONDANSETRON 2 MG/ML
4 INJECTION INTRAMUSCULAR; INTRAVENOUS ONCE AS NEEDED
Status: CANCELLED | OUTPATIENT
Start: 2020-05-29 | End: 2020-05-29

## 2020-05-29 RX ORDER — FAMOTIDINE 20 MG/1
20 TABLET ORAL ONCE
Status: COMPLETED | OUTPATIENT
Start: 2020-05-29 | End: 2020-05-29

## 2020-05-29 RX ORDER — HYDROCODONE BITARTRATE AND ACETAMINOPHEN 5; 325 MG/1; MG/1
1 TABLET ORAL AS NEEDED
Status: CANCELLED | OUTPATIENT
Start: 2020-05-29

## 2020-05-29 RX ORDER — CEFAZOLIN SODIUM/WATER 2 G/20 ML
2 SYRINGE (ML) INTRAVENOUS ONCE
Status: COMPLETED | OUTPATIENT
Start: 2020-05-29 | End: 2020-05-29

## 2020-05-29 RX ORDER — HYDROMORPHONE HYDROCHLORIDE 1 MG/ML
0.4 INJECTION, SOLUTION INTRAMUSCULAR; INTRAVENOUS; SUBCUTANEOUS EVERY 5 MIN PRN
Status: CANCELLED | OUTPATIENT
Start: 2020-05-29

## 2020-05-29 RX ORDER — MORPHINE SULFATE 10 MG/ML
6 INJECTION, SOLUTION INTRAMUSCULAR; INTRAVENOUS EVERY 10 MIN PRN
Status: CANCELLED | OUTPATIENT
Start: 2020-05-29

## 2020-05-29 RX ORDER — MORPHINE SULFATE 2 MG/ML
2 INJECTION, SOLUTION INTRAMUSCULAR; INTRAVENOUS EVERY 10 MIN PRN
Status: CANCELLED | OUTPATIENT
Start: 2020-05-29

## 2020-05-29 RX ORDER — METOCLOPRAMIDE 10 MG/1
10 TABLET ORAL ONCE
Status: COMPLETED | OUTPATIENT
Start: 2020-05-29 | End: 2020-05-29

## 2020-05-29 RX ORDER — TRAMADOL HYDROCHLORIDE 50 MG/1
50 TABLET ORAL EVERY 6 HOURS PRN
Qty: 10 TABLET | Refills: 0 | Status: SHIPPED | OUTPATIENT
Start: 2020-05-29 | End: 2020-07-21 | Stop reason: ALTCHOICE

## 2020-05-29 RX ORDER — SODIUM CHLORIDE, SODIUM LACTATE, POTASSIUM CHLORIDE, CALCIUM CHLORIDE 600; 310; 30; 20 MG/100ML; MG/100ML; MG/100ML; MG/100ML
INJECTION, SOLUTION INTRAVENOUS CONTINUOUS
Status: DISCONTINUED | OUTPATIENT
Start: 2020-05-29 | End: 2020-05-29

## 2020-05-29 RX ORDER — HYDROMORPHONE HYDROCHLORIDE 1 MG/ML
0.2 INJECTION, SOLUTION INTRAMUSCULAR; INTRAVENOUS; SUBCUTANEOUS EVERY 5 MIN PRN
Status: CANCELLED | OUTPATIENT
Start: 2020-05-29

## 2020-05-29 RX ORDER — METOPROLOL TARTRATE 5 MG/5ML
2.5 INJECTION INTRAVENOUS ONCE
Status: CANCELLED | OUTPATIENT
Start: 2020-05-29 | End: 2020-05-29

## 2020-05-29 RX ADMIN — SODIUM CHLORIDE, SODIUM LACTATE, POTASSIUM CHLORIDE, CALCIUM CHLORIDE: 600; 310; 30; 20 INJECTION, SOLUTION INTRAVENOUS at 13:19:00

## 2020-05-29 RX ADMIN — SODIUM CHLORIDE, SODIUM LACTATE, POTASSIUM CHLORIDE, CALCIUM CHLORIDE: 600; 310; 30; 20 INJECTION, SOLUTION INTRAVENOUS at 13:38:00

## 2020-05-29 RX ADMIN — CEFAZOLIN SODIUM/WATER 2 G: 2 G/20 ML SYRINGE (ML) INTRAVENOUS at 13:25:00

## 2020-05-29 NOTE — ANESTHESIA PREPROCEDURE EVALUATION
Anesthesia PreOp Note    HPI:     John Crouch is a 81year old female who presents for preoperative consultation requested by: Evelyn Navarrete MD    Date of Surgery: 5/29/2020    Procedure(s):  EXTREMITY UPPER HARDWARE REMOVAL  Indication: left radi Osteoarthritis        Past Surgical History:   Procedure Laterality Date   • CATARACT      Bilateral   • ELBOW OPEN REDUCTION INTERNAL FIXATION Left 4/9/2020    Performed by Chanelle Ramsey MD at [address] [place] [address] Right 8/12/2019 Inhaler, Rfl: 3, More than a month at Unknown time  albuterol sulfate (2.5 MG/3ML) 0.083% Inhalation Nebu Soln, Take 3 mL (2.5 mg total) by nebulization every 4 (four) hours as needed for Wheezing or Shortness of Breath., Disp: 30 ampule, Rfl: 0, Unknown a Types: Cigarettes        Quit date: 2020        Years since quittin.0      Smokeless tobacco: Never Used    Substance and Sexual Activity      Alcohol use:  Yes        Alcohol/week: 1.0 standard drinks        Types: 1 Standard drinks or equivalen 31.17 kg/m². height is 1.702 m (5' 7\") and weight is 90.3 kg (199 lb).     05/27/20  1413 05/29/20  1014   Weight: 90.7 kg (200 lb) 90.3 kg (199 lb)   Height: 1.702 m (5' 7\")         Anesthesia Evaluation     Patient summary reviewed and Nursing notes r

## 2020-05-29 NOTE — INTERVAL H&P NOTE
Pre-op Diagnosis: left radial head fracture, status post open reduction internal fixation left radial head fracture    The above referenced H&P was reviewed by Francis Reed MD on 5/29/2020, the patient was examined and no significant changes have occur

## 2020-05-29 NOTE — BRIEF OP NOTE
Pre-Operative Diagnosis: painful retained orthopedic hardware,  left radial head fracture   Post-Operative Diagnosis: Painful retained orthopedic hardware, left radial head fracture   Procedure Performed: removal hardware left radius, fluoroscopy    Jamie Hall

## 2020-05-29 NOTE — ANESTHESIA POSTPROCEDURE EVALUATION
Patient: Artheugene Gear    Procedure Summary     Date:  05/29/20 Room / Location:  [facility] OR 09 / [facility] OR    Anesthesia Start:  2628 Anesthesia Stop:  1666    Procedure:  EXTREMITY UPPER HARDWARE REMOVAL (Left Wrist) Diagnosis:  (left radial head f

## 2020-05-30 NOTE — OPERATIVE REPORT
[facility]    [facility] [address] PHYSICIAN: Michael Ramon MD   OPERATING PHYSICIAN: Sri Ramon MD   PATIENT ACCOUNT#:   [id number]    LOCATION:  [address]  MEDICAL RECORD #:   [id number]       DATE ointment and Xeroform and then sterile gauze and sterile Webril. She then had an Ace wrap. Pictures were then taken with the fluoroscopy of the ursula site, distal radius, as well as the elbow with the fracture.   This showed the fracture was stable and fl

## 2020-06-04 ENCOUNTER — MED REC SCAN ONLY (OUTPATIENT)
Dept: INTERNAL MEDICINE CLINIC | Facility: CLINIC | Age: 70
End: 2020-06-04

## 2020-06-04 ENCOUNTER — MED REC SCAN ONLY (OUTPATIENT)
Dept: ORTHOPEDICS CLINIC | Facility: CLINIC | Age: 70
End: 2020-06-04

## 2020-06-04 ENCOUNTER — TELEPHONE (OUTPATIENT)
Dept: INTERNAL MEDICINE CLINIC | Facility: CLINIC | Age: 70
End: 2020-06-04

## 2020-06-04 NOTE — TELEPHONE ENCOUNTER
I received blood pressure readings her blood pressure is well controlled, there are few spikes where  the blood pressure are  high , I will suggest her to continue to watch her diet avoid salty food and check blood pressure once a day or every other day wh

## 2020-06-16 ENCOUNTER — OFFICE VISIT (OUTPATIENT)
Dept: ORTHOPEDICS CLINIC | Facility: CLINIC | Age: 70
End: 2020-06-16
Payer: MEDICARE

## 2020-06-16 ENCOUNTER — HOSPITAL ENCOUNTER (OUTPATIENT)
Dept: GENERAL RADIOLOGY | Facility: HOSPITAL | Age: 70
Discharge: HOME | End: 2020-06-16
Attending: ORTHOPAEDIC SURGERY
Payer: MEDICARE

## 2020-06-16 ENCOUNTER — TELEPHONE (OUTPATIENT)
Dept: RADIATION ONCOLOGY | Facility: HOSPITAL | Age: 70
End: 2020-06-16

## 2020-06-16 DIAGNOSIS — Z47.89 ORTHOPEDIC AFTERCARE: ICD-10-CM

## 2020-06-16 DIAGNOSIS — M25.532 LEFT WRIST PAIN: ICD-10-CM

## 2020-06-16 DIAGNOSIS — S52.122D CLOSED DISPLACED FRACTURE OF HEAD OF LEFT RADIUS WITH ROUTINE HEALING, SUBSEQUENT ENCOUNTER: Primary | ICD-10-CM

## 2020-06-16 DIAGNOSIS — S52.124D CLOSED NONDISPLACED FRACTURE OF HEAD OF RIGHT RADIUS WITH ROUTINE HEALING, SUBSEQUENT ENCOUNTER: ICD-10-CM

## 2020-06-16 PROCEDURE — 73080 X-RAY EXAM OF ELBOW: CPT | Performed by: ORTHOPAEDIC SURGERY

## 2020-06-16 PROCEDURE — 99024 POSTOP FOLLOW-UP VISIT: CPT | Performed by: ORTHOPAEDIC SURGERY

## 2020-06-16 PROCEDURE — 73090 X-RAY EXAM OF FOREARM: CPT | Performed by: ORTHOPAEDIC SURGERY

## 2020-06-16 PROCEDURE — G0463 HOSPITAL OUTPT CLINIC VISIT: HCPCS | Performed by: ORTHOPAEDIC SURGERY

## 2020-06-16 RX ORDER — LORAZEPAM 1 MG/1
1 TABLET ORAL AS NEEDED
Qty: 4 TABLET | Refills: 0 | Status: SHIPPED | OUTPATIENT
Start: 2020-06-16 | End: 2020-06-19

## 2020-06-16 NOTE — TELEPHONE ENCOUNTER
Called pt and informed her that an order has been sent to her pharmacy for Lorazepam to be taken prior to her MRI scheduled for 6/25. Pre-authorization will be required by her insurance. Pt verbalized understanding.  States her  will be driving her t

## 2020-06-16 NOTE — PROGRESS NOTES
NURSING INTAKE COMMENTS: Patient presents with:  Post-Op: 2 week post op visit from removal hardware left radius, fluoroscopy on 5/29. Denies any pain, numbness or tingling. Stts that wrist is still sensitive.         HPI: This 81year old female presents Calcium Citrate (CALCITRATE OR) Take 350 mg by mouth 2 (two) times a day. • carvedilol 3.125 MG Oral Tab Take 1 tablet (3.125 mg total) by mouth 2 (two) times daily with meals.  180 tablet 0   • Calcium Citrate-Vitamin D 315-250 MG-UNIT Oral Tab Take 1 Former Smoker        Packs/day: 0.25        Years: 50.00        Pack years: 12.5        Types: Cigarettes        Quit date: 2020        Years since quittin.1      Smokeless tobacco: Never Used    Substance and Sexual Activity      Alcohol use: Yes swelling. Neurological: Normal motor and sensory to both hands and fingers. Imaging: X-rays of both elbows showed the fracture is healing in excellent alignment.   The forearm x-ray of the left wrist and hand show the entrance site for the nail healed a on 5/19/2020 at 1:49 PM          Xr Fluoroscopy C-arm Time <1 Hour  (cpt=76000)    Result Date: 5/29/2020  PROCEDURE: XR FLUORO PHYSICIAN TIME< 1 HOUR (CPT=76000)  COMPARISON: [facility], XR FLUOROSCOPY C-ARM  TIME< 1 HOUR (CPT=76000), 4/09 be her last visit. I told her she can start lifting up to 15 to 20 pounds. Follow Up: Return in about 4 weeks (around 7/14/2020) for X-ray both elbows.     Xiomy Purdy MD

## 2020-06-16 NOTE — TELEPHONE ENCOUNTER
Patient have a MRI on 6/25/20 and is asking if she can have some medication for sedation during the MRI like the last time so she can relax during the MRI.

## 2020-06-23 ENCOUNTER — OFFICE VISIT (OUTPATIENT)
Dept: PULMONOLOGY | Facility: CLINIC | Age: 70
End: 2020-06-23
Payer: MEDICARE

## 2020-06-23 VITALS
OXYGEN SATURATION: 99 % | RESPIRATION RATE: 18 BRPM | WEIGHT: 200 LBS | HEART RATE: 69 BPM | BODY MASS INDEX: 31.39 KG/M2 | HEIGHT: 67 IN | SYSTOLIC BLOOD PRESSURE: 127 MMHG | DIASTOLIC BLOOD PRESSURE: 88 MMHG

## 2020-06-23 DIAGNOSIS — J43.9 PULMONARY EMPHYSEMA, UNSPECIFIED EMPHYSEMA TYPE (HCC): Primary | ICD-10-CM

## 2020-06-23 PROCEDURE — G0463 HOSPITAL OUTPT CLINIC VISIT: HCPCS | Performed by: INTERNAL MEDICINE

## 2020-06-23 PROCEDURE — 99213 OFFICE O/P EST LOW 20 MIN: CPT | Performed by: INTERNAL MEDICINE

## 2020-06-23 NOTE — PROGRESS NOTES
The patient is a 82-year-old female who I know well from prior evaluation as I saw her last November. She quit smoking in January. She notes that her breathing is better. She remains on Symbicort and Incruse Ellipta.   She underwent low-dose CT screening

## 2020-06-25 ENCOUNTER — HOSPITAL ENCOUNTER (OUTPATIENT)
Dept: MRI IMAGING | Age: 70
Discharge: HOME | End: 2020-06-25
Attending: RADIOLOGY
Payer: MEDICARE

## 2020-06-25 DIAGNOSIS — D32.9 MENINGIOMA (HCC): ICD-10-CM

## 2020-06-25 PROCEDURE — 82565 ASSAY OF CREATININE: CPT

## 2020-06-25 PROCEDURE — A9575 INJ GADOTERATE MEGLUMI 0.1ML: HCPCS | Performed by: RADIOLOGY

## 2020-06-25 PROCEDURE — 70553 MRI BRAIN STEM W/O & W/DYE: CPT | Performed by: RADIOLOGY

## 2020-06-26 ENCOUNTER — DOCUMENTATION ONLY (OUTPATIENT)
Dept: RADIATION ONCOLOGY | Facility: HOSPITAL | Age: 70
End: 2020-06-26

## 2020-06-30 ENCOUNTER — TELEPHONE (OUTPATIENT)
Dept: ORTHOPEDICS CLINIC | Facility: CLINIC | Age: 70
End: 2020-06-30

## 2020-06-30 DIAGNOSIS — S52.124D CLOSED NONDISPLACED FRACTURE OF HEAD OF RIGHT RADIUS WITH ROUTINE HEALING, SUBSEQUENT ENCOUNTER: Primary | ICD-10-CM

## 2020-06-30 DIAGNOSIS — M25.532 LEFT WRIST PAIN: ICD-10-CM

## 2020-06-30 DIAGNOSIS — S52.122D CLOSED DISPLACED FRACTURE OF HEAD OF LEFT RADIUS WITH ROUTINE HEALING, SUBSEQUENT ENCOUNTER: ICD-10-CM

## 2020-07-21 ENCOUNTER — HOSPITAL ENCOUNTER (OUTPATIENT)
Dept: GENERAL RADIOLOGY | Facility: HOSPITAL | Age: 70
Discharge: HOME | End: 2020-07-21
Attending: ORTHOPAEDIC SURGERY
Payer: MEDICARE

## 2020-07-21 ENCOUNTER — OFFICE VISIT (OUTPATIENT)
Dept: ORTHOPEDICS CLINIC | Facility: CLINIC | Age: 70
End: 2020-07-21
Payer: MEDICARE

## 2020-07-21 VITALS — BODY MASS INDEX: 30.76 KG/M2 | HEIGHT: 67 IN | WEIGHT: 196 LBS

## 2020-07-21 DIAGNOSIS — Z47.89 ORTHOPEDIC AFTERCARE: ICD-10-CM

## 2020-07-21 DIAGNOSIS — S52.124D CLOSED NONDISPLACED FRACTURE OF HEAD OF RIGHT RADIUS WITH ROUTINE HEALING, SUBSEQUENT ENCOUNTER: ICD-10-CM

## 2020-07-21 DIAGNOSIS — S52.122D CLOSED DISPLACED FRACTURE OF HEAD OF LEFT RADIUS WITH ROUTINE HEALING, SUBSEQUENT ENCOUNTER: Primary | ICD-10-CM

## 2020-07-21 PROCEDURE — 73090 X-RAY EXAM OF FOREARM: CPT | Performed by: ORTHOPAEDIC SURGERY

## 2020-07-21 PROCEDURE — 73080 X-RAY EXAM OF ELBOW: CPT | Performed by: ORTHOPAEDIC SURGERY

## 2020-07-21 PROCEDURE — 99024 POSTOP FOLLOW-UP VISIT: CPT | Performed by: ORTHOPAEDIC SURGERY

## 2020-07-21 PROCEDURE — G0463 HOSPITAL OUTPT CLINIC VISIT: HCPCS | Performed by: ORTHOPAEDIC SURGERY

## 2020-07-21 NOTE — PROGRESS NOTES
NURSING INTAKE COMMENTS: No chief complaint on file. HPI: This 81year old female presents today with her  3-1/2 months after open reduction internal fixation with an intramedullary nail for the left radial neck fracture.   The right radial neck 160-4.5 MCG/ACT Inhalation Aerosol Inhale 2 puffs into the lungs 2 (two) times daily. 3 Inhaler 3   • acetaminophen 500 MG Oral Tab Take 500 mg by mouth every 6 (six) hours as needed for Pain.      • Albuterol Sulfate HFA (PROVENTIL HFA) 108 (90 Base) MCG/A vision  HEENT: denies new nasal congestion, sinus pain or ST  LUNGS: denies shortness of breath  CARDIOVASCULAR: denies chest pain  GI: no hematemesis, no worsening heartburn, no diarrhea  : no dysuria, no blood in urine, no difficulty urinating, no inco of suspected pathology.   Images were performed without and with gadolinium contrast.   FINDINGS:  CSF SPACES: There is a stable partially calcified uniformly enhancing 1.7 x 1.6 x 1.8 cm extra-axial dural based mass along the right frontal/anterior parafal associated right frontal lobe sulcal effacement, but without vasogenic edema. 2. New aerated secretions in the left maxillary sinus, as can be seen in the setting of acute sinusitis.    Dictated by (CST): Jayshree Moore MD on 6/25/2020 at 2:34 PM     Final

## 2020-08-05 RX ORDER — CARVEDILOL 3.12 MG/1
3.12 TABLET ORAL 2 TIMES DAILY WITH MEALS
Qty: 180 TABLET | Refills: 0 | Status: SHIPPED | OUTPATIENT
Start: 2020-08-05 | End: 2020-08-19

## 2020-08-19 ENCOUNTER — TELEPHONE (OUTPATIENT)
Dept: INTERNAL MEDICINE CLINIC | Facility: CLINIC | Age: 70
End: 2020-08-19

## 2020-08-19 ENCOUNTER — MED REC SCAN ONLY (OUTPATIENT)
Dept: INTERNAL MEDICINE CLINIC | Facility: CLINIC | Age: 70
End: 2020-08-19

## 2020-08-19 ENCOUNTER — OFFICE VISIT (OUTPATIENT)
Dept: INTERNAL MEDICINE CLINIC | Facility: CLINIC | Age: 70
End: 2020-08-19
Payer: MEDICARE

## 2020-08-19 VITALS
WEIGHT: 200.38 LBS | OXYGEN SATURATION: 98 % | DIASTOLIC BLOOD PRESSURE: 68 MMHG | HEART RATE: 70 BPM | TEMPERATURE: 98 F | BODY MASS INDEX: 31.45 KG/M2 | HEIGHT: 67 IN | SYSTOLIC BLOOD PRESSURE: 108 MMHG

## 2020-08-19 DIAGNOSIS — Z12.11 SCREENING FOR COLON CANCER: Primary | ICD-10-CM

## 2020-08-19 PROBLEM — S52.132A CLOSED DISPLACED FRACTURE OF NECK OF LEFT RADIUS: Status: RESOLVED | Noted: 2020-04-09 | Resolved: 2020-08-19

## 2020-08-19 PROBLEM — E04.1 THYROID NODULE: Status: RESOLVED | Noted: 2020-02-03 | Resolved: 2020-08-19

## 2020-08-19 PROBLEM — L72.3 SEBACEOUS CYST: Status: RESOLVED | Noted: 2019-06-25 | Resolved: 2020-08-19

## 2020-08-19 PROBLEM — S52.134D CLOSED NONDISPLACED FRACTURE OF NECK OF RIGHT RADIUS WITH ROUTINE HEALING: Status: RESOLVED | Noted: 2020-04-09 | Resolved: 2020-08-19

## 2020-08-19 PROBLEM — T84.84XA PAINFUL ORTHOPAEDIC HARDWARE (HCC): Status: RESOLVED | Noted: 2020-05-29 | Resolved: 2020-08-19

## 2020-08-19 PROBLEM — T84.84XA PAINFUL ORTHOPAEDIC HARDWARE: Status: RESOLVED | Noted: 2020-05-29 | Resolved: 2020-08-19

## 2020-08-19 PROCEDURE — G0439 PPPS, SUBSEQ VISIT: HCPCS | Performed by: INTERNAL MEDICINE

## 2020-08-19 RX ORDER — TRIAMTERENE AND HYDROCHLOROTHIAZIDE 37.5; 25 MG/1; MG/1
1 TABLET ORAL
Qty: 90 TABLET | Refills: 3 | Status: SHIPPED | OUTPATIENT
Start: 2020-08-19 | End: 2021-02-17

## 2020-08-19 RX ORDER — CARVEDILOL 3.12 MG/1
3.12 TABLET ORAL 2 TIMES DAILY WITH MEALS
Qty: 180 TABLET | Refills: 3 | Status: SHIPPED | OUTPATIENT
Start: 2020-08-19 | End: 2021-02-17

## 2020-08-19 NOTE — PROGRESS NOTES
HPI:   Radha Lainez is a 81year old female who presents for a Medicare Subsequent Annual Wellness visit (Pt already had Initial Annual Wellness).       Her last annual assessment has been over 1 year: Annual Physical due on 11/03/2016         Fall/Risk and refresh this link)        CAGE Alcohol Screen abnormal    She has been screened for EtOH abuse and her score is not 0:    Cut : Yes  Annoyed : No  Guilty : No  Eye Opener : No  Total Score: 1       Patient Care Team: Patient Care Team:  Mita Hurtado, (3.125 mg total) by mouth 2 (two) times daily with meals. Calcium Citrate (CALCITRATE OR), Take 350 mg by mouth 2 (two) times a day. Triamterene-HCTZ 37.5-25 MG Oral Tab, Take 1 tablet by mouth once daily.   nicotine 21 MG/24HR Transdermal Patch 24 Hr, Pl blurred vision or double vision  HEENT: denies nasal congestion, sinus pain or ST  LUNGS: denies shortness of breath with exertion  CARDIOVASCULAR: denies chest pain on exertion  GI: denies abdominal pain, denies heartburn  : denies dysuria, vaginal disc because I misunderstand what they say:  No   I misunderstand what others are saying and make inappropriate responses:  No I avoid social activities because I cannot hear well and fear I will reply improperly:  No   Family members and friends have told me t and time. She displays normal reflexes. No cranial nerve deficit, sensory deficit or motor deficit. Coordination and gait normal.   Skin: Skin is warm and dry. Psychiatric: She has a normal mood and affect.         Vaccination History     Immunization His months, have you lost more than 10 pounds without trying?: 2 - No  Has your appetite been poor?: No  How does the patient maintain a good energy level?: Daily Walks  How would you describe your daily physical activity?: Moderate  How would you describe you Mammogram  Annually to 78, then as discussed Mammogram due on 02/13/2020 Update Health Maintenance if applicable     Immunizations (Update Immunization Activity if applicable)     Influenza  Covered Annually No vaccine history found Please get every year

## 2020-08-19 NOTE — TELEPHONE ENCOUNTER
RECEIVED FAX FROM [facility] WITH VACCINES FOR PT, THEY HAVE BEEN INPUT IN IMMUNIZATIONS AND COPIES FOR SCANNING.

## 2020-08-19 NOTE — TELEPHONE ENCOUNTER
SPOKE WITH [facility] IN [place], PT HAD PREVNAR 13, AND INFLUENZA VACCINE ON 10-1-19, REQUESTED COPY TO BE FAXED TO OUR OFFICE, NO RECENT PNEUMOVAX 23.

## 2020-08-29 ENCOUNTER — PATIENT MESSAGE (OUTPATIENT)
Dept: INTERNAL MEDICINE CLINIC | Facility: CLINIC | Age: 70
End: 2020-08-29

## 2020-08-29 NOTE — TELEPHONE ENCOUNTER
Dr. Dino Gary, please see patient message and advise. The Calcitrate is not on patients medication list. Thanks.

## 2020-08-29 NOTE — TELEPHONE ENCOUNTER
From: Shiva Carrasco  To: Thom Rodriguez MD  Sent: 8/29/2020 12:43 PM CDT  Subject: Prescription Question    Could you please refill my Calcitrate 315-250 MG-Unit Tab Sharon? One tablet 2x daily (this is what Dr Sonali Erickson prescribed).  Also can I get prescri

## 2020-09-01 RX ORDER — VARENICLINE TARTRATE 1 MG/1
1 TABLET, FILM COATED ORAL 2 TIMES DAILY
Qty: 60 TABLET | Refills: 0 | Status: SHIPPED | OUTPATIENT
Start: 2020-09-01 | End: 2021-02-17

## 2020-09-01 NOTE — TELEPHONE ENCOUNTER
Dr Dori Perrin, patient is at pharmacy, she is requesting Chantix continuation page or 1 mg tablet rx, she has 2 days left on the starter apge and is doing well. Although it is flagged as an allergy She splits the tablets and is not nauseated.     Please reply to

## 2020-09-05 RX ORDER — METHADONE HYDROCHLORIDE 10 MG/ML
CONCENTRATE ORAL
Qty: 60 TABLET | Refills: 0 | OUTPATIENT
Start: 2020-09-05

## 2020-09-05 NOTE — TELEPHONE ENCOUNTER
LOV 7/21/20 sx 5/29/20 removal of hardware left radius. This med was just filled by pcp on 8/30 #180.

## 2020-09-10 ENCOUNTER — HOSPITAL ENCOUNTER (OUTPATIENT)
Dept: MAMMOGRAPHY | Facility: HOSPITAL | Age: 70
Discharge: HOME | End: 2020-09-10
Attending: INTERNAL MEDICINE
Payer: MEDICARE

## 2020-09-10 DIAGNOSIS — Z12.31 ENCOUNTER FOR SCREENING MAMMOGRAM FOR BREAST CANCER: ICD-10-CM

## 2020-09-10 PROCEDURE — 77063 BREAST TOMOSYNTHESIS BI: CPT | Performed by: INTERNAL MEDICINE

## 2020-09-10 PROCEDURE — 77067 SCR MAMMO BI INCL CAD: CPT | Performed by: INTERNAL MEDICINE

## 2020-09-15 ENCOUNTER — TELEPHONE (OUTPATIENT)
Dept: INTERNAL MEDICINE CLINIC | Facility: CLINIC | Age: 70
End: 2020-09-15

## 2020-11-02 ENCOUNTER — TELEPHONE (OUTPATIENT)
Dept: PULMONOLOGY | Facility: CLINIC | Age: 70
End: 2020-11-02

## 2020-11-28 ENCOUNTER — HOSPITAL ENCOUNTER (OUTPATIENT)
Dept: CT IMAGING | Facility: HOSPITAL | Age: 70
Discharge: HOME | End: 2020-11-28
Attending: INTERNAL MEDICINE
Payer: MEDICARE

## 2020-11-28 DIAGNOSIS — Z87.891 PERSONAL HISTORY OF TOBACCO USE, PRESENTING HAZARDS TO HEALTH: ICD-10-CM

## 2020-12-08 ENCOUNTER — TELEPHONE (OUTPATIENT)
Dept: PULMONOLOGY | Facility: CLINIC | Age: 70
End: 2020-12-08

## 2020-12-08 DIAGNOSIS — Z87.891 PERSONAL HISTORY OF TOBACCO USE, PRESENTING HAZARDS TO HEALTH: Primary | ICD-10-CM

## 2020-12-08 NOTE — TELEPHONE ENCOUNTER
Spoke with patient informed her of Dr. Croft Core result note/order. Patient verbalized understanding. CT chest LD ordered and placed in chronic calendar.

## 2020-12-08 NOTE — TELEPHONE ENCOUNTER
----- Message from Giovanni Alegria MD sent at 12/6/2020  6:49 PM CST -----  RN, please call the patient to let her know that her low-dose CT scan of the chest is stable. The tiny pulmonary nodules have not grown.   Please add to the calendar a repeat low-

## 2020-12-09 ENCOUNTER — MED REC SCAN ONLY (OUTPATIENT)
Dept: INTERNAL MEDICINE CLINIC | Facility: CLINIC | Age: 70
End: 2020-12-09

## 2021-02-01 DIAGNOSIS — Z23 NEED FOR VACCINATION: ICD-10-CM

## 2021-02-14 ENCOUNTER — TELEPHONE (OUTPATIENT)
Dept: INTERNAL MEDICINE CLINIC | Facility: CLINIC | Age: 71
End: 2021-02-14

## 2021-02-17 ENCOUNTER — OFFICE VISIT (OUTPATIENT)
Dept: INTERNAL MEDICINE CLINIC | Facility: CLINIC | Age: 71
End: 2021-02-17
Payer: MEDICARE

## 2021-02-17 VITALS
SYSTOLIC BLOOD PRESSURE: 137 MMHG | WEIGHT: 197 LBS | OXYGEN SATURATION: 97 % | DIASTOLIC BLOOD PRESSURE: 85 MMHG | HEART RATE: 65 BPM | HEIGHT: 67 IN | BODY MASS INDEX: 30.92 KG/M2

## 2021-02-17 DIAGNOSIS — F32.4 MAJOR DEPRESSIVE DISORDER WITH SINGLE EPISODE, IN PARTIAL REMISSION (HCC): ICD-10-CM

## 2021-02-17 DIAGNOSIS — Z12.11 SCREENING FOR COLON CANCER: ICD-10-CM

## 2021-02-17 DIAGNOSIS — I10 ESSENTIAL HYPERTENSION: Primary | ICD-10-CM

## 2021-02-17 DIAGNOSIS — J43.9 PULMONARY EMPHYSEMA, UNSPECIFIED EMPHYSEMA TYPE (HCC): ICD-10-CM

## 2021-02-17 DIAGNOSIS — D32.9 MENINGIOMA (HCC): ICD-10-CM

## 2021-02-17 DIAGNOSIS — N18.30 STAGE 3 CHRONIC KIDNEY DISEASE, UNSPECIFIED WHETHER STAGE 3A OR 3B CKD (HCC): ICD-10-CM

## 2021-02-17 PROCEDURE — 99214 OFFICE O/P EST MOD 30 MIN: CPT | Performed by: INTERNAL MEDICINE

## 2021-02-17 RX ORDER — TRIAMTERENE AND HYDROCHLOROTHIAZIDE 37.5; 25 MG/1; MG/1
1 TABLET ORAL
Qty: 90 TABLET | Refills: 1 | Status: SHIPPED | OUTPATIENT
Start: 2021-02-17 | End: 2021-10-08

## 2021-02-17 RX ORDER — CARVEDILOL 3.12 MG/1
3.12 TABLET ORAL 2 TIMES DAILY WITH MEALS
Qty: 180 TABLET | Refills: 1 | Status: SHIPPED | OUTPATIENT
Start: 2021-02-17

## 2021-02-17 NOTE — PROGRESS NOTES
HPI:    Patient ID: Alisha Her is a 78year old female. HPI     She came in today for follow-up. According to the patient she is not checking her blood pressure at home but she is taking her medications regularly.   She needs refill on her medicat hallucinations and sleep disturbance. The patient is not nervous/anxious. Current Outpatient Medications   Medication Sig Dispense Refill   • Triamterene-HCTZ 37.5-25 MG Oral Tab Take 1 tablet by mouth once daily.  90 tablet 1   • carvedilol 3.125 Performed by Elly Hawthorne MD at [facility] MAIN OR   • EXTREMITY UPPER HARDWARE REMOVAL Left 5/29/2020    Performed by Rexann Prader, MD at [address] [place] [address]   • INCISION AND DRAINAGE Right 06/11/2019    sebaceous cyst right arm   • OTHER Bilateral     vein surgery posterior oropharyngeal erythema. Eyes: Pupils are equal, round, and reactive to light. Conjunctivae and EOM are normal. Right eye exhibits no discharge. Left eye exhibits no discharge. No scleral icterus. Neck: Normal range of motion. Neck supple.  No smoking  No orders of the defined types were placed in this encounter. Meds This Visit:  Requested Prescriptions     Signed Prescriptions Disp Refills   • Triamterene-HCTZ 37.5-25 MG Oral Tab 90 tablet 1     Sig: Take 1 tablet by mouth once daily.    •

## 2021-03-08 NOTE — TELEPHONE ENCOUNTER
pls follow up on her make sure she scheduled cindy verbal cues/nonverbal cues (demo/gestures)/1 person assist

## 2021-03-24 ENCOUNTER — PATIENT MESSAGE (OUTPATIENT)
Dept: INTERNAL MEDICINE CLINIC | Facility: CLINIC | Age: 71
End: 2021-03-24

## 2021-03-24 NOTE — TELEPHONE ENCOUNTER
From: Fernando Esparza  To: Treasure Camara MD  Sent: 3/24/2021 11:29 AM CDT  Subject: Other    COVID Vaccine Record Card

## 2021-05-01 ENCOUNTER — APPOINTMENT (OUTPATIENT)
Dept: ULTRASOUND IMAGING | Facility: HOSPITAL | Age: 71
End: 2021-05-01
Attending: EMERGENCY MEDICINE
Payer: MEDICARE

## 2021-05-01 ENCOUNTER — HOSPITAL ENCOUNTER (EMERGENCY)
Facility: HOSPITAL | Age: 71
Discharge: HOME | End: 2021-05-01
Attending: EMERGENCY MEDICINE
Payer: MEDICARE

## 2021-05-01 ENCOUNTER — TELEPHONE (OUTPATIENT)
Dept: INTERNAL MEDICINE CLINIC | Facility: CLINIC | Age: 71
End: 2021-05-01

## 2021-05-01 VITALS
HEIGHT: 67 IN | HEART RATE: 64 BPM | WEIGHT: 190 LBS | BODY MASS INDEX: 29.82 KG/M2 | DIASTOLIC BLOOD PRESSURE: 68 MMHG | TEMPERATURE: 98 F | RESPIRATION RATE: 16 BRPM | OXYGEN SATURATION: 99 % | SYSTOLIC BLOOD PRESSURE: 143 MMHG

## 2021-05-01 DIAGNOSIS — I82.4Z2 LOWER LEG DVT (DEEP VENOUS THROMBOEMBOLISM), ACUTE, LEFT (HCC): Primary | ICD-10-CM

## 2021-05-01 PROCEDURE — 93971 EXTREMITY STUDY: CPT | Performed by: EMERGENCY MEDICINE

## 2021-05-01 PROCEDURE — 85025 COMPLETE CBC W/AUTO DIFF WBC: CPT | Performed by: EMERGENCY MEDICINE

## 2021-05-01 PROCEDURE — 36415 COLL VENOUS BLD VENIPUNCTURE: CPT

## 2021-05-01 PROCEDURE — 99284 EMERGENCY DEPT VISIT MOD MDM: CPT

## 2021-05-01 NOTE — CM/SW NOTE
Called pharmacist at [facility] in [place] at [phone] and spoke with Damian Boyd, pharmacist. [facility] gave her coupon information and she ran coupon and it would not go through stating \"if patient has Medicare Part D they are not eligible for free coupon\".

## 2021-05-01 NOTE — CM/SW NOTE
Received call from patient regarding her new prescription for 15mg Xarelto BID for 21 days. Patient stated that she attempted to fill the prescription at her [address] in [place] at [phone] and they told her it would cost $430.   She tried to [facility]

## 2021-05-01 NOTE — ED QUICK NOTES
Patient safe to DC home per MD. Susna Castro to dress self. DC teaching done, instructions reviewed with patient including when and how to follow up with healthcare providers and when to seek emergency care. The patient verbalizes understanding.  Patient ambulatory

## 2021-05-01 NOTE — ED PROVIDER NOTES
Patient Seen in: [facility] AND [facility] Emergency Department    History   Patient presents with:  Swelling Edema    Stated Complaint: left leg pain    HPI    Patient is here with complaint of discomfort she noticed to the left leg about 1 week ago when she ha mouth.   Cholecalciferol (VITAMIN D3 OR),  Take by mouth. Coenzyme Q10 (CO Q 10 OR),  Take by mouth. Stress Formula/Zinc Oral Tab,  Take 1 tablet by mouth daily.    Umeclidinium Bromide (INCRUSE ELLIPTA) 62.5 MCG/INH Inhalation Aerosol Powder, Breath Ac Lower extremities were examined closely.   The right appears normal without edema pulse 2+ the left lower extremity has 2+ edema below the knee more concentrated around the ankle there is a subtle mild redness noted around the lower leg no open wounds pulse (primary encounter diagnosis)    Disposition:  Discharge    Follow-up:  Avila Melendez MD  [address] [place] [address]  [phone]    In 5 days  For re-check      Medications Prescribed:  Current Discharge Medication List    START taking these me

## 2021-05-01 NOTE — TELEPHONE ENCOUNTER
Dr. Anahy Hernadez from [facility] calling from [phone] regarding patient with acute DVT. Can be started on Xarelto. Message to PCP to follow-up.

## 2021-05-01 NOTE — CM/SW NOTE
Received call back from Rosemarie Blanton, [facility] and he stated that he spoke with Spencer in [facility], regarding the Xarelto coupon that would not go through for the Medicare patient.     Rosemarie Blanton stated that the \"Free 30 day Trial Offer' has to

## 2021-05-01 NOTE — ED QUICK NOTES
Pt presents for eval of L calf pain and LLE swelling. States she noticed a sharp pain in L calf 2 weeks ago while sitting down watching TV. Over the last week, pain and swelling has worsened. Denies any injury/travel or recent travel.  Used [employer] on calf

## 2021-05-05 ENCOUNTER — OFFICE VISIT (OUTPATIENT)
Dept: INTERNAL MEDICINE CLINIC | Facility: CLINIC | Age: 71
End: 2021-05-05
Payer: MEDICARE

## 2021-05-05 VITALS
HEART RATE: 70 BPM | HEIGHT: 67 IN | DIASTOLIC BLOOD PRESSURE: 77 MMHG | SYSTOLIC BLOOD PRESSURE: 124 MMHG | BODY MASS INDEX: 30.13 KG/M2 | WEIGHT: 192 LBS

## 2021-05-05 DIAGNOSIS — I82.402 DEEP VEIN THROMBOSIS (DVT) OF LEFT LOWER EXTREMITY, UNSPECIFIED CHRONICITY, UNSPECIFIED VEIN (HCC): Primary | ICD-10-CM

## 2021-05-05 DIAGNOSIS — Z12.31 ENCOUNTER FOR SCREENING MAMMOGRAM FOR MALIGNANT NEOPLASM OF BREAST: ICD-10-CM

## 2021-05-05 PROCEDURE — 99213 OFFICE O/P EST LOW 20 MIN: CPT | Performed by: INTERNAL MEDICINE

## 2021-05-05 NOTE — PROGRESS NOTES
HPI/Subjective:     Patient ID: Aranza High is a 78year old female.     HPI She came in today for ER follow-up according to the patient she went to emergency room on the May 1 because of her left leg pain and swelling she was diagnosed with DVT they s is not nervous/anxious. Current Outpatient Medications   Medication Sig Dispense Refill   • Rivaroxaban (XARELTO) 20 MG Oral Tab Take 1 tablet (20 mg total) by mouth daily with food.  90 tablet 0   • rivaroxaban (XARELTO) 15 MG Oral Tab Take 1 tablet ( Bilateral   • INCISION AND DRAINAGE Right 06/11/2019    sebaceous cyst right arm   • OTHER Bilateral     vein surgery    • THYROID LOBECTOMY,UNILAT  02/03/2020      Family History   Problem Relation Age of Onset   • Diabetes Father    • Cancer Mother 6 tenderness or tracheal deviation. Cardiovascular:      Rate and Rhythm: Normal rate and regular rhythm. Heart sounds: Normal heart sounds. No murmur heard. No friction rub. No gallop.     Pulmonary:      Effort: Pulmonary effort is normal. No acces Prescriptions     Signed Prescriptions Disp Refills   • Rivaroxaban (XARELTO) 20 MG Oral Tab 90 tablet 0     Sig: Take 1 tablet (20 mg total) by mouth daily with food.        Imaging & Referrals:  OP REFERRAL TO [place]  [place]

## 2021-05-11 ENCOUNTER — APPOINTMENT (OUTPATIENT)
Dept: HEMATOLOGY/ONCOLOGY | Facility: HOSPITAL | Age: 71
End: 2021-05-11
Attending: INTERNAL MEDICINE
Payer: MEDICARE

## 2021-05-11 VITALS
OXYGEN SATURATION: 100 % | WEIGHT: 192 LBS | DIASTOLIC BLOOD PRESSURE: 68 MMHG | TEMPERATURE: 97 F | BODY MASS INDEX: 30.13 KG/M2 | HEART RATE: 67 BPM | SYSTOLIC BLOOD PRESSURE: 155 MMHG | RESPIRATION RATE: 18 BRPM | HEIGHT: 67 IN

## 2021-05-11 DIAGNOSIS — I82.402 DEEP VEIN THROMBOSIS (DVT) OF LEFT LOWER EXTREMITY, UNSPECIFIED CHRONICITY, UNSPECIFIED VEIN (HCC): Primary | ICD-10-CM

## 2021-05-11 DIAGNOSIS — Z51.81 ANTICOAGULATION MANAGEMENT ENCOUNTER: ICD-10-CM

## 2021-05-11 DIAGNOSIS — Z79.01 ANTICOAGULATION MANAGEMENT ENCOUNTER: ICD-10-CM

## 2021-05-11 PROCEDURE — 99204 OFFICE O/P NEW MOD 45 MIN: CPT | Performed by: INTERNAL MEDICINE

## 2021-05-11 RX ORDER — FLUTICASONE FUROATE, UMECLIDINIUM BROMIDE AND VILANTEROL TRIFENATATE 100; 62.5; 25 UG/1; UG/1; UG/1
1 POWDER RESPIRATORY (INHALATION) DAILY
COMMUNITY

## 2021-05-12 NOTE — CONSULTS
[facility]    PATIENT'S NAME: Alison Chaudhari   CONSULTING PHYSICIAN: Collette Footman C. Marionette Bossier, MD   PATIENT ACCOUNT #: [de-identified] LOCATION: [address] [place] [address] RECORD #: [id number] YOB: 1950   CONSULTATION DATE: 05/11/2021       CANCER CE performance status 0. Weight is 87 kg. Temperature 97.0 Fahrenheit, pulse ox 100% on room air, respiratory rate, pulse 67, blood pressure is 155/68. HEENT:  Moist mucous membranes. Oropharynx clear. NECK:  Supple. LUNGS:  Symmetrical expansion.   Nonl MD

## 2021-06-04 NOTE — TELEPHONE ENCOUNTER
pls schedule physical for next month she is overdue , plus she needs follow up after surgery Detail Level: Detailed

## 2021-06-17 ENCOUNTER — TELEPHONE (OUTPATIENT)
Dept: PULMONOLOGY | Facility: CLINIC | Age: 71
End: 2021-06-17

## 2021-06-17 NOTE — TELEPHONE ENCOUNTER
Per LOV 6/23/20:  Assessment and plan:  1. COPD– doing well clinically on current medical regime. We do not have a baseline PFT yet.   We will hold off on getting until after the coronavirus pandemic has passed.     Recommendations: PFTs in the future, co

## 2021-06-17 NOTE — TELEPHONE ENCOUNTER
Pt is calling to see if she has any test that  ordered that needs to be done, she thinks she is supposed to have a PFT test done,, there is a PFT test that is active but I believe it has . . please advise

## 2021-07-06 ENCOUNTER — NURSE TRIAGE (OUTPATIENT)
Dept: INTERNAL MEDICINE CLINIC | Facility: CLINIC | Age: 71
End: 2021-07-06

## 2021-07-06 NOTE — TELEPHONE ENCOUNTER
I ll send zpack   since she has copdand tapering steroids, continue with inhalers ,chedule video or televisit for tomorrow, if any sob go to er

## 2021-07-06 NOTE — TELEPHONE ENCOUNTER
----- Message from Jose Buck sent at 7/6/2021 11:50 AM CDT -----  Regarding: Non-Urgent Medical Question  Contact: [phone]  I've been having some wheezing and coughing. Not severe enough to go to hospitol.   I started doing breathing treatments

## 2021-07-06 NOTE — TELEPHONE ENCOUNTER
Action Requested: Summary for Provider     []  Critical Lab, Recommendations Needed  [] Need Additional Advice  []   FYI    []   Need Orders  [x] Need Medications Sent to Pharmacy  []  Other     SUMMARY: Patient c/o wheezing, with white clear phlegm, which

## 2021-07-06 NOTE — TELEPHONE ENCOUNTER
Is she wheezing only or she has cough? Any pain when she coughs? Any sob? Fever or chills ? Is she vaccinated ?

## 2021-07-07 ENCOUNTER — VIRTUAL PHONE E/M (OUTPATIENT)
Dept: INTERNAL MEDICINE CLINIC | Facility: CLINIC | Age: 71
End: 2021-07-07
Payer: MEDICARE

## 2021-07-07 DIAGNOSIS — R05.9 COUGH: ICD-10-CM

## 2021-07-07 PROCEDURE — 99441 PHONE E/M BY PHYS 5-10 MIN: CPT | Performed by: INTERNAL MEDICINE

## 2021-07-07 RX ORDER — ALBUTEROL SULFATE 2.5 MG/3ML
2.5 SOLUTION RESPIRATORY (INHALATION) EVERY 6 HOURS PRN
Qty: 30 EACH | Refills: 0 | Status: SHIPPED | OUTPATIENT
Start: 2021-07-07

## 2021-07-07 RX ORDER — ALBUTEROL SULFATE 90 UG/1
2 AEROSOL, METERED RESPIRATORY (INHALATION) EVERY 6 HOURS PRN
Qty: 2 EACH | Refills: 0 | Status: SHIPPED | OUTPATIENT
Start: 2021-07-07

## 2021-07-07 NOTE — PROGRESS NOTES
Virtual Telephone Check-In    Winferd Speak verbally consents to a Virtual/Telephone Check-In visit on 07/07/21. Patient has been referred to the [facility] website at [email or web address] to review the yearly Consent to Treat document.     Patient unders

## 2021-07-20 ENCOUNTER — OFFICE VISIT (OUTPATIENT)
Dept: PULMONOLOGY | Facility: CLINIC | Age: 71
End: 2021-07-20
Payer: MEDICARE

## 2021-07-20 VITALS
HEART RATE: 78 BPM | OXYGEN SATURATION: 98 % | RESPIRATION RATE: 14 BRPM | WEIGHT: 188.38 LBS | HEIGHT: 67 IN | SYSTOLIC BLOOD PRESSURE: 148 MMHG | DIASTOLIC BLOOD PRESSURE: 78 MMHG | BODY MASS INDEX: 29.57 KG/M2

## 2021-07-20 DIAGNOSIS — J43.9 PULMONARY EMPHYSEMA, UNSPECIFIED EMPHYSEMA TYPE (HCC): Primary | ICD-10-CM

## 2021-07-20 PROCEDURE — 99213 OFFICE O/P EST LOW 20 MIN: CPT | Performed by: INTERNAL MEDICINE

## 2021-07-20 RX ORDER — BUPROPION HYDROCHLORIDE 150 MG/1
150 TABLET, EXTENDED RELEASE ORAL 2 TIMES DAILY
Qty: 60 TABLET | Refills: 5 | Status: SHIPPED | OUTPATIENT
Start: 2021-07-20 | End: 2021-08-25

## 2021-08-12 ENCOUNTER — TELEPHONE (OUTPATIENT)
Dept: GASTROENTEROLOGY | Facility: CLINIC | Age: 71
End: 2021-08-12

## 2021-08-12 ENCOUNTER — OFFICE VISIT (OUTPATIENT)
Dept: GASTROENTEROLOGY | Facility: CLINIC | Age: 71
End: 2021-08-12
Payer: MEDICARE

## 2021-08-12 VITALS
WEIGHT: 184 LBS | SYSTOLIC BLOOD PRESSURE: 126 MMHG | HEIGHT: 67 IN | DIASTOLIC BLOOD PRESSURE: 88 MMHG | BODY MASS INDEX: 28.88 KG/M2

## 2021-08-12 DIAGNOSIS — Z12.11 SCREEN FOR COLON CANCER: Primary | ICD-10-CM

## 2021-08-12 DIAGNOSIS — Z12.11 SCREENING FOR COLORECTAL CANCER: Primary | ICD-10-CM

## 2021-08-12 DIAGNOSIS — Z83.71 FAMILY HISTORY OF COLONIC POLYPS: ICD-10-CM

## 2021-08-12 DIAGNOSIS — Z12.12 SCREENING FOR COLORECTAL CANCER: Primary | ICD-10-CM

## 2021-08-12 PROCEDURE — 99203 OFFICE O/P NEW LOW 30 MIN: CPT | Performed by: INTERNAL MEDICINE

## 2021-08-12 NOTE — H&P
[facility] - Gastroenterology                                                                                                  Clinic History and Physical Cigarettes      Smokeless tobacco: Never Used    Vaping Use      Vaping Use: Never used    Alcohol use:  Yes      Alcohol/week: 1.0 standard drinks      Types: 1 Standard drinks or equivalent per week    Drug use: No       Medications (Active prior to today 7/20/2021 ) 1 each 5     Allergies:    Meloxicam               RENAL INSUFFICIENCY    Comment:\"affected my kidneys\"  Chantix [Vareniclin*    NAUSEA ONLY    Comment:Other reaction(s): mood swings  Strawberries            RASH    Comment:Hives    ROS:   Yoseph satisfaction. The patient signed informed consent and elected to proceed with colonoscopy with intervention [i.e. polypectomy, biopsy, control of bleeding, etc.] as indicated.  I also discussed a ride home from a family member of friend is required and jude

## 2021-08-12 NOTE — TELEPHONE ENCOUNTER
----- Message from Bette Castro MD sent at 8/12/2021  2:31 PM CDT -----  She's been treated for 3 months so can do it anytime hold for 2 days prior. ----- Message -----  From: Jose Walton MD  Sent: 8/12/2021   2:07 PM CDT  To:  Bette Castro MD

## 2021-08-12 NOTE — TELEPHONE ENCOUNTER
GI staff:    Please let the patient know I communicated with Dr. James Mckeon, and she can hold her Xarelto 2 days prior to her procedure which is recommended.     Thanks    Sabrina Arriola MD  [address] [place] - Gastroenterology  8/12/2021  2:44 PM

## 2021-08-12 NOTE — PATIENT INSTRUCTIONS
1. Schedule colonoscopy with monitored anesthesia care (MAC) with Dr. Garcia Navarro    2.  bowel prep from pharmacy (split trilyte or golytely).  As we discussed it is important to take the bowel preparation in two parts taking 2L of the liquid the night

## 2021-08-12 NOTE — TELEPHONE ENCOUNTER
Scheduled for:  Colonoscopy 60922  Provider Name:  Dr. Jerod Choi  Date:  12/01/2021  Location:  [facility]  Sedation:  MAC  Time:  7:30 am, (pt is aware that [facility] will call the day before to confirm arrival time)  Prep:  Golytely  Meds/Allergies Reconciled?:  Kwadwo

## 2021-08-13 NOTE — TELEPHONE ENCOUNTER
Sent patient Mychart instructions for Xarelto. Patient is scheduled for procedure on 12/1/21. Will postpone encounter to follow-up closer to procedure date and review again.

## 2021-08-25 ENCOUNTER — OFFICE VISIT (OUTPATIENT)
Dept: INTERNAL MEDICINE CLINIC | Facility: CLINIC | Age: 71
End: 2021-08-25
Payer: MEDICARE

## 2021-08-25 VITALS
HEART RATE: 66 BPM | HEIGHT: 67 IN | SYSTOLIC BLOOD PRESSURE: 124 MMHG | WEIGHT: 184 LBS | BODY MASS INDEX: 28.88 KG/M2 | OXYGEN SATURATION: 96 % | DIASTOLIC BLOOD PRESSURE: 79 MMHG

## 2021-08-25 DIAGNOSIS — F17.200 SMOKER: ICD-10-CM

## 2021-08-25 DIAGNOSIS — Z00.00 ENCOUNTER FOR ANNUAL HEALTH EXAMINATION: Primary | ICD-10-CM

## 2021-08-25 PROBLEM — I82.4Y2 ACUTE DEEP VEIN THROMBOSIS (DVT) OF PROXIMAL VEIN OF LEFT LOWER EXTREMITY (HCC): Status: ACTIVE | Noted: 2021-08-25

## 2021-08-25 PROCEDURE — 99406 BEHAV CHNG SMOKING 3-10 MIN: CPT | Performed by: INTERNAL MEDICINE

## 2021-08-25 PROCEDURE — G0439 PPPS, SUBSEQ VISIT: HCPCS | Performed by: INTERNAL MEDICINE

## 2021-08-25 RX ORDER — NICOTINE 21 MG/24HR
1 PATCH, TRANSDERMAL 24 HOURS TRANSDERMAL EVERY 24 HOURS
Qty: 30 PATCH | Refills: 2 | Status: SHIPPED | OUTPATIENT
Start: 2021-08-25
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
## 2021-08-25 NOTE — PATIENT INSTRUCTIONS
True Emily Elder's SCREENING SCHEDULE   Tests on this list are recommended by your physician but may not be covered, or covered at this frequency, by your insurer. Please check with your insurance carrier before scheduling to verify coverage.    PREVENT 12/17/2019      No recommendations at this time   Pap and Pelvic    Pap   Covered every 2 years for women at normal risk;  Annually if at high risk -  No recommendations at this time    Chlamydia Annually if high risk -  No recommendations at this time   Sc necessary form from the [employer]. http://www. [place]ph.state. [place]/public/books/advin.htm  A link to the [employer].  This site has a lot of good information including definitions of the different types of Ad

## 2021-08-31 DIAGNOSIS — D32.9 MENINGIOMA (HCC): Primary | ICD-10-CM

## 2021-09-08 ENCOUNTER — LAB ENCOUNTER (OUTPATIENT)
Dept: LAB | Facility: REFERENCE LAB | Age: 71
End: 2021-09-08
Attending: INTERNAL MEDICINE
Payer: MEDICARE

## 2021-09-08 DIAGNOSIS — Z00.00 ENCOUNTER FOR ANNUAL HEALTH EXAMINATION: ICD-10-CM

## 2021-09-08 LAB
ALBUMIN SERPL-MCNC: 3.5 G/DL (ref 3.4–5)
ALBUMIN/GLOB SERPL: 1.1 {RATIO} (ref 1–2)
ALP LIVER SERPL-CCNC: 60 U/L
ALT SERPL-CCNC: 22 U/L
ANION GAP SERPL CALC-SCNC: 8 MMOL/L (ref 0–18)
AST SERPL-CCNC: 21 U/L (ref 15–37)
BASOPHILS # BLD AUTO: 0.09 X10(3) UL (ref 0–0.2)
BASOPHILS NFR BLD AUTO: 1.4 %
BILIRUB SERPL-MCNC: 1.1 MG/DL (ref 0.1–2)
BUN BLD-MCNC: 19 MG/DL (ref 7–18)
BUN/CREAT SERPL: 18.4 (ref 10–20)
CALCIUM BLD-MCNC: 9.2 MG/DL (ref 8.5–10.1)
CHLORIDE SERPL-SCNC: 99 MMOL/L (ref 98–112)
CHOLEST SMN-MCNC: 226 MG/DL (ref ?–200)
CO2 SERPL-SCNC: 27 MMOL/L (ref 21–32)
CREAT BLD-MCNC: 1.03 MG/DL
DEPRECATED RDW RBC AUTO: 44 FL (ref 35.1–46.3)
EOSINOPHIL # BLD AUTO: 0.24 X10(3) UL (ref 0–0.7)
EOSINOPHIL NFR BLD AUTO: 3.8 %
ERYTHROCYTE [DISTWIDTH] IN BLOOD BY AUTOMATED COUNT: 12.8 % (ref 11–15)
GLOBULIN PLAS-MCNC: 3.1 G/DL (ref 2.8–4.4)
GLUCOSE BLD-MCNC: 90 MG/DL (ref 70–99)
HCT VFR BLD AUTO: 39 %
HDLC SERPL-MCNC: 77 MG/DL (ref 40–59)
HGB BLD-MCNC: 13.5 G/DL
IMM GRANULOCYTES # BLD AUTO: 0.02 X10(3) UL (ref 0–1)
IMM GRANULOCYTES NFR BLD: 0.3 %
LDLC SERPL CALC-MCNC: 132 MG/DL (ref ?–100)
LYMPHOCYTES # BLD AUTO: 1.92 X10(3) UL (ref 1–4)
LYMPHOCYTES NFR BLD AUTO: 30.8 %
M PROTEIN MFR SERPL ELPH: 6.6 G/DL (ref 6.4–8.2)
MCH RBC QN AUTO: 32.5 PG (ref 26–34)
MCHC RBC AUTO-ENTMCNC: 34.6 G/DL (ref 31–37)
MCV RBC AUTO: 94 FL
MONOCYTES # BLD AUTO: 0.77 X10(3) UL (ref 0.1–1)
MONOCYTES NFR BLD AUTO: 12.3 %
NEUTROPHILS # BLD AUTO: 3.2 X10 (3) UL (ref 1.5–7.7)
NEUTROPHILS # BLD AUTO: 3.2 X10(3) UL (ref 1.5–7.7)
NEUTROPHILS NFR BLD AUTO: 51.4 %
NONHDLC SERPL-MCNC: 149 MG/DL (ref ?–130)
OSMOLALITY SERPL CALC.SUM OF ELEC: 280 MOSM/KG (ref 275–295)
PATIENT FASTING Y/N/NP: YES
PATIENT FASTING Y/N/NP: YES
PLATELET # BLD AUTO: 218 10(3)UL (ref 150–450)
POTASSIUM SERPL-SCNC: 3.6 MMOL/L (ref 3.5–5.1)
RBC # BLD AUTO: 4.15 X10(6)UL
SODIUM SERPL-SCNC: 134 MMOL/L (ref 136–145)
TRIGL SERPL-MCNC: 97 MG/DL (ref 30–149)
TSI SER-ACNC: 1.94 MIU/ML (ref 0.36–3.74)
VLDLC SERPL CALC-MCNC: 18 MG/DL (ref 0–30)
WBC # BLD AUTO: 6.2 X10(3) UL (ref 4–11)

## 2021-09-08 PROCEDURE — 85025 COMPLETE CBC W/AUTO DIFF WBC: CPT

## 2021-09-08 PROCEDURE — 80061 LIPID PANEL: CPT

## 2021-09-08 PROCEDURE — 80053 COMPREHEN METABOLIC PANEL: CPT

## 2021-09-08 PROCEDURE — 84443 ASSAY THYROID STIM HORMONE: CPT

## 2021-09-08 PROCEDURE — 36415 COLL VENOUS BLD VENIPUNCTURE: CPT

## 2021-09-17 ENCOUNTER — HOSPITAL ENCOUNTER (OUTPATIENT)
Dept: MRI IMAGING | Age: 71
Discharge: HOME | End: 2021-09-17
Attending: RADIOLOGY
Payer: MEDICARE

## 2021-09-17 DIAGNOSIS — D32.9 MENINGIOMA (HCC): ICD-10-CM

## 2021-09-17 PROCEDURE — 70553 MRI BRAIN STEM W/O & W/DYE: CPT | Performed by: RADIOLOGY

## 2021-09-17 PROCEDURE — A9575 INJ GADOTERATE MEGLUMI 0.1ML: HCPCS | Performed by: RADIOLOGY

## 2021-09-24 ENCOUNTER — HOSPITAL ENCOUNTER (OUTPATIENT)
Dept: MAMMOGRAPHY | Age: 71
Discharge: HOME | End: 2021-09-24
Attending: INTERNAL MEDICINE
Payer: MEDICARE

## 2021-09-24 DIAGNOSIS — Z12.31 ENCOUNTER FOR SCREENING MAMMOGRAM FOR MALIGNANT NEOPLASM OF BREAST: ICD-10-CM

## 2021-09-24 PROCEDURE — 77067 SCR MAMMO BI INCL CAD: CPT | Performed by: INTERNAL MEDICINE

## 2021-09-24 PROCEDURE — 77063 BREAST TOMOSYNTHESIS BI: CPT | Performed by: INTERNAL MEDICINE

## 2021-09-28 ENCOUNTER — OFFICE VISIT (OUTPATIENT)
Dept: RADIATION ONCOLOGY | Facility: HOSPITAL | Age: 71
End: 2021-09-28
Attending: RADIOLOGY
Payer: MEDICARE

## 2021-09-28 VITALS
SYSTOLIC BLOOD PRESSURE: 142 MMHG | TEMPERATURE: 98 F | HEART RATE: 68 BPM | DIASTOLIC BLOOD PRESSURE: 78 MMHG | OXYGEN SATURATION: 98 % | RESPIRATION RATE: 16 BRPM

## 2021-09-28 PROCEDURE — 99211 OFF/OP EST MAY X REQ PHY/QHP: CPT

## 2021-09-28 NOTE — PROGRESS NOTES
Nursing Follow-Up Note    Patient: Van Arreola  YOB: 1950  Age: 78year old  Radiation Oncologist: Dr. Debbra Duverney  Referring Physician: Ashlie Solis  Chief Complaint: Patient presents with:  Meningioma    Date: 9/28/2021    Toxicities: N

## 2021-09-28 NOTE — PATIENT INSTRUCTIONS
Call [phone] in a year-year and half to obtain order for brain MRI. You'll follow up with Dr. Marj Brothers following imaging.

## 2021-09-28 NOTE — PROGRESS NOTES
RADIATION ONCOLOGY NOTE    DATE OF VISIT: 9/28/2021    DIAGNOSIS :  Right frontal meningioma, 1.8cm, stable on repeat MRI, currently being observed. History of chronic GREENE.       Dear Cosme Sanabria and colleagues,            As you recall, pt is a 77 yo Roya Meneses MD on 9/27/2021 at 12:36 PM     Finalized by (CST): Roya Meneses MD on 9/27/2021 at 12:38 PM              ROS    A 12 Point review of system was obtained and is as above and per HPI and nursing note.          Current Outpatient Medica (DVT) of proximal vein of left lower extremity ([address] (8/25/2021), Anxiety, Cataract, COPD (chronic obstructive pulmonary disease) ([address] Depression, Disorder of thyroid, High blood pressure, Meningioma ([address] and Osteoarthritis.  She also has no past medica no upper or lower extremity edema. NEUROLOGIC:  Cranial nerves II-XII are intact. Neuro exam is nonfocal.    COMPLETED TESTS:  I have reviewed the patient's clinical, radiographic, pathologic and laboratory studies.     ASSESSMENT/PLAN    77 yo with rig superior frontal gyrus, but without significant mass effect or associated vasogenic edema. The ventricles, cisterns, and sulci are commensurate in caliber and appropriate   for age.  No hydrocephalus, subarachnoid hemorrhage, or effacement of the basal cist

## 2021-10-08 RX ORDER — TRIAMTERENE AND HYDROCHLOROTHIAZIDE 37.5; 25 MG/1; MG/1
1 TABLET ORAL DAILY
Qty: 90 TABLET | Refills: 1 | Status: SHIPPED | OUTPATIENT
Start: 2021-10-08 | End: 2022-04-02

## 2021-10-08 NOTE — TELEPHONE ENCOUNTER
Refill passed per [facility] protocol. Requested Prescriptions   Pending Prescriptions Disp Refills    TRIAMTERENE-HCTZ 37.5-25 MG Oral Tab [Pharmacy Med Name: Triamterene/Hydrochlorothiazide 37.5-25 Mg Tab Apot] 90 tablet 0     Sig: TAKE ONE TABLET BY MOUTH ONE TIME DAILY        Hypertensive Medications Protocol Passed - 10/8/2021  8:03 AM        Passed - CMP or BMP in past 12 months        Passed - Appointment in past 6 or next 3 months        Passed - GFR Non- > 50     Lab Results   Component Value Date    GFRNAA 56 (L) 09/08/2021                       Future Appointments         Provider Department Appt Notes    In 1 month Cassi Santos MD [facility] Hematology Oncology FOLLOW UP VISIT. CL  6M w d dimer    In 1 month JOHANA, PROCEDURE [address] GI PROCEDURE Colon @ [facility]    In 5 months Laci Frias MD [employer], [facility], [facility] 6 month fu             Recent Outpatient Visits              1 week ago     Surjit Pickett MD    Office Visit    1 month ago Encounter for annual health examination    David Morgan MD    Office Visit    1 month ago Screening for colorectal cancer    [facility], Cite Michelle Davies MD    Office Visit    2 months ago Pulmonary emphysema, unspecified emphysema type [facility])    Ashlyn Warner MD    Office Visit    3 months ago Gage Hand, Torsten Cowan MD    [employer] [facility]

## 2021-10-14 ENCOUNTER — TELEPHONE (OUTPATIENT)
Dept: PULMONOLOGY | Facility: CLINIC | Age: 71
End: 2021-10-14

## 2021-11-06 ENCOUNTER — LAB ENCOUNTER (OUTPATIENT)
Dept: LAB | Age: 71
End: 2021-11-06
Attending: INTERNAL MEDICINE
Payer: MEDICARE

## 2021-11-06 DIAGNOSIS — I82.402 DEEP VEIN THROMBOSIS (DVT) OF LEFT LOWER EXTREMITY, UNSPECIFIED CHRONICITY, UNSPECIFIED VEIN (HCC): ICD-10-CM

## 2021-11-06 PROCEDURE — 36415 COLL VENOUS BLD VENIPUNCTURE: CPT

## 2021-11-06 PROCEDURE — 85379 FIBRIN DEGRADATION QUANT: CPT

## 2021-11-11 ENCOUNTER — APPOINTMENT (OUTPATIENT)
Dept: HEMATOLOGY/ONCOLOGY | Facility: HOSPITAL | Age: 71
End: 2021-11-11
Attending: INTERNAL MEDICINE
Payer: MEDICARE

## 2021-11-12 ENCOUNTER — OFFICE VISIT (OUTPATIENT)
Dept: HEMATOLOGY/ONCOLOGY | Facility: HOSPITAL | Age: 71
End: 2021-11-12
Attending: INTERNAL MEDICINE
Payer: MEDICARE

## 2021-11-12 VITALS
OXYGEN SATURATION: 99 % | SYSTOLIC BLOOD PRESSURE: 160 MMHG | TEMPERATURE: 98 F | DIASTOLIC BLOOD PRESSURE: 65 MMHG | WEIGHT: 187 LBS | HEART RATE: 63 BPM | HEIGHT: 67 IN | RESPIRATION RATE: 18 BRPM | BODY MASS INDEX: 29.35 KG/M2

## 2021-11-12 DIAGNOSIS — Z79.01 ANTICOAGULATION MANAGEMENT ENCOUNTER: ICD-10-CM

## 2021-11-12 DIAGNOSIS — N18.9 CHRONIC KIDNEY DISEASE, UNSPECIFIED CKD STAGE: ICD-10-CM

## 2021-11-12 DIAGNOSIS — I82.402 DEEP VEIN THROMBOSIS (DVT) OF LEFT LOWER EXTREMITY, UNSPECIFIED CHRONICITY, UNSPECIFIED VEIN (HCC): Primary | ICD-10-CM

## 2021-11-12 DIAGNOSIS — Z51.81 ANTICOAGULATION MANAGEMENT ENCOUNTER: ICD-10-CM

## 2021-11-12 PROCEDURE — 99214 OFFICE O/P EST MOD 30 MIN: CPT | Performed by: INTERNAL MEDICINE

## 2021-11-12 NOTE — PROGRESS NOTES
Cancer Center Progress Note    Patient Name: Alena Weiss   YOB: 1950   Medical Record Number: [id number]   Attending Physician: Amber Florian M.D.      Chief Complaint:  dvt    History of Present Illness:  63-year-old female being evaluat History:  Social History    Socioeconomic History      Marital status: Single    Tobacco Use      Smoking status: Current Some Day Smoker        Packs/day: 0.25        Years: 50.00        Pack years: 12.5        Types: Cigarettes      Smokeless tobacco: Ne mouth., Disp: , Rfl:   •  Stress Formula/Zinc Oral Tab, Take 1 tablet by mouth daily. , Disp: , Rfl:   •  nicotine (NICODERM CQ) 14 MG/24HR Transdermal Patch 24 Hr, Place 1 patch onto the skin daily.  (Patient not taking: No sig reported), Disp: 30 patch, Rf swings  Strawberries            RASH    Comment:Hives     Review of Systems:  All other systems reviewed and negative x12    Vital Signs:  /65 (BP Location: Radial, Patient Position: Sitting, Cuff Size: adult)   Pulse 63   Temp 98.2 °F (36.8 °C) (Ora we discussed anticoagulation with rivaroxaban using a therapeutic dose for 6 months and then a preventative dose thereafter especially given her family history  --Orders placed to continue rivaroxaban 10 mg daily  --Return to clinic in [place] [address], M

## 2021-11-16 NOTE — TELEPHONE ENCOUNTER
Contacted patient and confirmed Xarelto orders were received which patient verbalized understanding of. I also informed patient that [facility] will call her day before with arrival time.

## 2021-11-28 ENCOUNTER — LAB REQUISITION (OUTPATIENT)
Dept: SURGERY | Age: 71
End: 2021-11-28
Payer: MEDICARE

## 2021-11-28 DIAGNOSIS — Z01.818 PREOP EXAMINATION: ICD-10-CM

## 2021-12-01 ENCOUNTER — TELEPHONE (OUTPATIENT)
Dept: INTERNAL MEDICINE CLINIC | Facility: CLINIC | Age: 71
End: 2021-12-01

## 2021-12-01 ENCOUNTER — OFFICE VISIT (OUTPATIENT)
Dept: INTERNAL MEDICINE CLINIC | Facility: CLINIC | Age: 71
End: 2021-12-01
Payer: MEDICARE

## 2021-12-01 ENCOUNTER — SURGERY CENTER DOCUMENTATION (OUTPATIENT)
Dept: SURGERY | Age: 71
End: 2021-12-01

## 2021-12-01 ENCOUNTER — LAB REQUISITION (OUTPATIENT)
Dept: SURGERY | Age: 71
End: 2021-12-01
Payer: MEDICARE

## 2021-12-01 VITALS
HEART RATE: 74 BPM | OXYGEN SATURATION: 98 % | DIASTOLIC BLOOD PRESSURE: 73 MMHG | BODY MASS INDEX: 29.35 KG/M2 | WEIGHT: 187 LBS | HEIGHT: 67 IN | SYSTOLIC BLOOD PRESSURE: 113 MMHG

## 2021-12-01 DIAGNOSIS — R00.2 PALPITATION: Primary | ICD-10-CM

## 2021-12-01 DIAGNOSIS — Z12.12 ENCOUNTER FOR COLORECTAL CANCER SCREENING: ICD-10-CM

## 2021-12-01 DIAGNOSIS — Z12.11 SPECIAL SCREENING FOR MALIGNANT NEOPLASMS, COLON: ICD-10-CM

## 2021-12-01 DIAGNOSIS — Z12.11 ENCOUNTER FOR COLORECTAL CANCER SCREENING: ICD-10-CM

## 2021-12-01 PROCEDURE — 99213 OFFICE O/P EST LOW 20 MIN: CPT | Performed by: INTERNAL MEDICINE

## 2021-12-01 PROCEDURE — 93000 ELECTROCARDIOGRAM COMPLETE: CPT | Performed by: INTERNAL MEDICINE

## 2021-12-01 PROCEDURE — 88305 TISSUE EXAM BY PATHOLOGIST: CPT | Performed by: INTERNAL MEDICINE

## 2021-12-01 PROCEDURE — 45385 COLONOSCOPY W/LESION REMOVAL: CPT | Performed by: INTERNAL MEDICINE

## 2021-12-01 PROCEDURE — 45380 COLONOSCOPY AND BIOPSY: CPT | Performed by: INTERNAL MEDICINE

## 2021-12-01 NOTE — TELEPHONE ENCOUNTER
If she doesn't want to go to er and hr is ok now she can come and see me, we can do ekg , but if she still continues to have same symptoms hr fluctuates needs to go to er

## 2021-12-01 NOTE — PROGRESS NOTES
Subjective:     Patient ID: Aranza High is a 73year old female. HPI She came in today because of flattering sensation this morning.  Per patient she had colonoscopy today and after anesthesia her heart rate was up and down and she felt some flatter by mouth daily. • nicotine (NICODERM CQ) 14 MG/24HR Transdermal Patch 24 Hr Place 1 patch onto the skin daily.  (Patient not taking: No sig reported) 30 patch 2     Allergies:  Meloxicam               RENAL INSUFFICIENCY    Comment:\"affected my kidneys regular rhythm. Pulses: Normal pulses. Heart sounds: Normal heart sounds. Pulmonary:      Effort: Pulmonary effort is normal.      Breath sounds: Normal breath sounds. Abdominal:      General: Bowel sounds are normal. There is no distension.

## 2021-12-01 NOTE — TELEPHONE ENCOUNTER
Lencho Juarez I received a call from [facility]lle/anesthsiologist that pt had a colonoscopy done today under anesthesia. Pt is having intermittent heart rate of  and can feel some fluttering. Pt has no other s/sx.  She will like for the pt to be seen for derian

## 2021-12-01 NOTE — PROCEDURES
Colonoscopy Report    Kusum Fong     10/14/1950 Age 73year old   PCP Yonis Salmeron MD Endoscopist Aracely Saavedra MD     Date of procedure: 21    Procedure: Colonoscopy w/ biopsy and snare polypectomy    Pre-operative diagnosis: colorec complications. The patient’s vital signs were monitored throughout the procedure. There was some brief bradycardia during the middle of the procedure though otherwise remained stable.     Estimated blood loss: insignificant    Specimens collected:  Colon po

## 2021-12-01 NOTE — TELEPHONE ENCOUNTER
Jennifer Most pt was called and informed of your message below. Pt stated that she currently feels fine does not feel the fluttering. Pt stated that the reason they wanted her to see you was so you could order the EKG.  Dr. Latanya Leo ok to keep the appt with you for

## 2021-12-01 NOTE — TELEPHONE ENCOUNTER
Spoke to patient and relayed Dr. Castillo Pain message below. Patient verbalized understanding. She said her HR has been fine. She will keep her appointment with Dr. Dino Gary today.      Future Appointments   Date Time Provider Justyna Garcia   12/1/2021  5:45 PM

## 2021-12-01 NOTE — TELEPHONE ENCOUNTER
If patient has intermittent flattering  heart rate fluctuates between 80-140we have to make sure that she is not in A. fib I will suggest her to go to emergency room so they can do ekg stat, if she doesn't want to go then I will see her .   She has no histo

## 2021-12-02 NOTE — PATIENT INSTRUCTIONS
Palpitation  (primary encounter diagnosis)post anesthesia    ekg sinus rhythm at 67 bpm, no st t changes, she has no symptoms of palpitation/ I will holter monitor, I did advise her if any palpitation , chest pain, sob, go to er

## 2021-12-03 ENCOUNTER — TELEPHONE (OUTPATIENT)
Dept: GASTROENTEROLOGY | Facility: CLINIC | Age: 71
End: 2021-12-03

## 2021-12-03 NOTE — TELEPHONE ENCOUNTER
Health maintenance updated. Last colonoscopy done 12/1/21. 3 year recall placed into Pt Outreach, next due on 12/1/24 per Dr. Scout Stevens.

## 2021-12-03 NOTE — TELEPHONE ENCOUNTER
Called patient. Discussed her path results informing her the polyps were benign but at least 3 were adenomas considered pre-cancerous and guidelines recommend surveillance colonoscopy in 3 years.  Says she is still a bit tired from the procedure but otherwi

## 2021-12-07 ENCOUNTER — HOSPITAL ENCOUNTER (OUTPATIENT)
Dept: CT IMAGING | Facility: HOSPITAL | Age: 71
Discharge: HOME | End: 2021-12-07
Attending: INTERNAL MEDICINE
Payer: MEDICARE

## 2021-12-07 DIAGNOSIS — Z87.891 PERSONAL HISTORY OF TOBACCO USE, PRESENTING HAZARDS TO HEALTH: ICD-10-CM

## 2021-12-07 PROCEDURE — 71271 CT THORAX LUNG CANCER SCR C-: CPT | Performed by: INTERNAL MEDICINE

## 2021-12-08 ENCOUNTER — HOSPITAL ENCOUNTER (OUTPATIENT)
Dept: CV DIAGNOSTICS | Facility: HOSPITAL | Age: 71
Discharge: HOME | End: 2021-12-08
Attending: INTERNAL MEDICINE
Payer: MEDICARE

## 2021-12-08 DIAGNOSIS — R00.2 PALPITATION: ICD-10-CM

## 2021-12-08 PROCEDURE — 93243 EXT ECG>48HR<7D SCAN A/R: CPT | Performed by: INTERNAL MEDICINE

## 2021-12-08 PROCEDURE — 93242 EXT ECG>48HR<7D RECORDING: CPT | Performed by: INTERNAL MEDICINE

## 2021-12-08 PROCEDURE — 93244 EXT ECG>48HR<7D REV&INTERPJ: CPT | Performed by: INTERNAL MEDICINE

## 2021-12-17 DIAGNOSIS — Z87.891 PERSONAL HISTORY OF TOBACCO USE, PRESENTING HAZARDS TO HEALTH: Primary | ICD-10-CM

## 2022-01-17 ENCOUNTER — OFFICE VISIT (OUTPATIENT)
Dept: SURGERY | Facility: CLINIC | Age: 72
End: 2022-01-17
Payer: MEDICARE

## 2022-01-17 DIAGNOSIS — M18.11 PRIMARY OSTEOARTHRITIS OF FIRST CARPOMETACARPAL JOINT OF RIGHT HAND: ICD-10-CM

## 2022-01-17 DIAGNOSIS — M79.641 PAIN IN RIGHT HAND: Primary | ICD-10-CM

## 2022-01-17 PROCEDURE — 99204 OFFICE O/P NEW MOD 45 MIN: CPT | Performed by: PLASTIC SURGERY

## 2022-01-17 NOTE — H&P
Will Umaña is a 73year old female that presents with Patient presents with:  Pain: RTH  . REFERRED BY:  No ref.  provider found      Pacemaker: No  Latex Allergy: no  Coumadin: No  Plavix: No  Other anticoagulants: Xarelto  Cardiac stents: No    H • Anxiety    • Cataract    • Colon adenomas 12/01/2021    at least 3   • COPD (chronic obstructive pulmonary disease) (HCC)    • Depression    • Disorder of thyroid    • High blood pressure    • Meningioma [facility])     July 2017 started with dizzy spell and Flaxseed, Linseed, (FLAX SEED OIL OR) Take by mouth. • Cholecalciferol (VITAMIN D3 OR) Take by mouth. • Coenzyme Q10 (CO Q 10 OR) Take by mouth. • Stress Formula/Zinc Oral Tab Take 1 tablet by mouth daily.           SOCIAL HISTORY  Social Histor multiple treatments over a long period of time, and symptoms may not go away completely. Surgery is sometimes necessary. Questions were answered and the patient wishes to proceed with treatment.     Splint - I would recomment the following splint for comfo

## 2022-03-09 ENCOUNTER — TELEPHONE (OUTPATIENT)
Dept: INTERNAL MEDICINE CLINIC | Facility: CLINIC | Age: 72
End: 2022-03-09

## 2022-03-09 ENCOUNTER — OFFICE VISIT (OUTPATIENT)
Dept: INTERNAL MEDICINE CLINIC | Facility: CLINIC | Age: 72
End: 2022-03-09
Payer: MEDICARE

## 2022-03-09 VITALS
HEIGHT: 67 IN | TEMPERATURE: 97 F | HEART RATE: 61 BPM | SYSTOLIC BLOOD PRESSURE: 130 MMHG | WEIGHT: 183 LBS | DIASTOLIC BLOOD PRESSURE: 80 MMHG | BODY MASS INDEX: 28.72 KG/M2

## 2022-03-09 DIAGNOSIS — I10 PRIMARY HYPERTENSION: Primary | ICD-10-CM

## 2022-03-09 PROCEDURE — 99213 OFFICE O/P EST LOW 20 MIN: CPT | Performed by: INTERNAL MEDICINE

## 2022-03-09 RX ORDER — OMEGA-3/DHA/EPA/FISH OIL 900-1400MG
CAPSULE,DELAYED RELEASE (ENTERIC COATED) ORAL
COMMUNITY

## 2022-03-09 RX ORDER — ASPIRIN 81 MG/1
162 TABLET ORAL DAILY
COMMUNITY

## 2022-03-09 NOTE — PATIENT INSTRUCTIONS
htn - controlled , continue with  Current medication     dvt - she stopped xarelto- discussed about coumadin, will check with hematology regarding further recommendation

## 2022-03-09 NOTE — TELEPHONE ENCOUNTER
Spoke with patient and reinforced Dr Rowley Resides recommendations for prophylactic dosing of xarelto as previous DVT was unprovoked. Aurea Sterling states she has to meet a deductible but beyond that, her copay is also very high. With her new insurance, I cannot help her with the deductible, however, a copay card my help with the copay. I explained it will be at the  with instructions to register the card then bring in to the pharmacy. Stressed that if this does not work, please call our office back. She confirms understanding and will  tomorrow.

## 2022-03-09 NOTE — TELEPHONE ENCOUNTER
Can you please forward this message to hematology oncology group patient states that she cannot afford Xarelto due to high co-pay. She stopped taking for 2 weeks .  She is supposed to take life long per dr Amee Gonsales is also not covered    she wants dr Sosa Etienne opinion

## 2022-04-02 RX ORDER — TRIAMTERENE AND HYDROCHLOROTHIAZIDE 37.5; 25 MG/1; MG/1
1 TABLET ORAL DAILY
Qty: 90 TABLET | Refills: 1 | Status: SHIPPED | OUTPATIENT
Start: 2022-04-02

## 2022-04-02 NOTE — TELEPHONE ENCOUNTER
Refill passed per [facility] protocol   Requested Prescriptions   Pending Prescriptions Disp Refills    TRIAMTERENE-HCTZ 37.5-25 MG Oral Tab [Pharmacy Med Name: Triamterene/Hydrochlorothiazide 37.5-25 Mg Tab Zydu] 90 tablet 0     Sig: TAKE ONE TABLET BY MOUTH ONE TIME DAILY        Hypertensive Medications Protocol Passed - 4/2/2022  1:31 AM        Passed - CMP or BMP in past 12 months        Passed - Appointment in past 6 or next 3 months        Passed - GFR Non- > 50     Lab Results   Component Value Date    GFRNAA 56 (L) 09/08/2021

## 2022-04-17 NOTE — TELEPHONE ENCOUNTER
Refill passed per [facility] protocol, but last Rx shows 2/17/2021 for #180 + 1    Not reordered at 3/09/2022 office visit    Please send, if appropriate      Requested Prescriptions   Pending Prescriptions Disp Refills    CARVEDILOL 3.125 MG Oral Tab [Pharmacy Med Name: Carvedilol 3.125 Mg Tab Zydu] 180 tablet 1     Sig: Take 1 tablet (3.125 mg total) by mouth 2 (two) times daily with meals. Hypertensive Medications Protocol Passed - 4/17/2022  4:04 PM        Passed - CMP or BMP in past 12 months        Passed - Appointment in past 6 or next 3 months        Passed - GFR Non- > 50     Lab Results   Component Value Date    GFRNAA 64 (L) 09/08/2021                         Recent Outpatient Visits              1 month ago Primary hypertension    Hamzah Jonas MD    Office Visit    3 months ago Pain in right hand    [address] [facility],[address], Nadir Morel MD    Office Visit    4 months ago Yahaira Gilliland MD    Office Visit    5 months ago Deep vein thrombosis (DVT) of left lower extremity, unspecified chronicity, unspecified vein [facility] Hematology Oncology Josie Morris MD    Office Visit    6 months ago     Maya Allan MD    Office Visit             Future Appointments         Provider Department Appt Notes    In 5 months David Cannon MD [facility], Kiko Morel px     In 7 months Josie Morris MD [facility] AND CLINICS Hematology Oncology FOLLOW UP VISIT. CL  1y

## 2022-04-18 RX ORDER — CARVEDILOL 3.12 MG/1
3.12 TABLET ORAL 2 TIMES DAILY WITH MEALS
Qty: 180 TABLET | Refills: 1 | Status: SHIPPED | OUTPATIENT
Start: 2022-04-18

## 2022-06-11 RX ORDER — RIVAROXABAN 10 MG/1
TABLET, FILM COATED ORAL
Qty: 90 TABLET | Refills: 0 | OUTPATIENT
Start: 2022-06-11

## 2022-09-21 ENCOUNTER — OFFICE VISIT (OUTPATIENT)
Dept: INTERNAL MEDICINE CLINIC | Facility: CLINIC | Age: 72
End: 2022-09-21

## 2022-09-21 VITALS
SYSTOLIC BLOOD PRESSURE: 137 MMHG | HEART RATE: 59 BPM | TEMPERATURE: 97 F | DIASTOLIC BLOOD PRESSURE: 68 MMHG | BODY MASS INDEX: 27.62 KG/M2 | HEIGHT: 67 IN | WEIGHT: 176 LBS | OXYGEN SATURATION: 97 %

## 2022-09-21 DIAGNOSIS — D32.9 MENINGIOMA (HCC): ICD-10-CM

## 2022-09-21 DIAGNOSIS — F32.4 MAJOR DEPRESSIVE DISORDER WITH SINGLE EPISODE, IN PARTIAL REMISSION (HCC): ICD-10-CM

## 2022-09-21 DIAGNOSIS — F17.200 SMOKER: ICD-10-CM

## 2022-09-21 DIAGNOSIS — I82.402 DEEP VEIN THROMBOSIS (DVT) OF LEFT LOWER EXTREMITY, UNSPECIFIED CHRONICITY, UNSPECIFIED VEIN (HCC): ICD-10-CM

## 2022-09-21 DIAGNOSIS — Z12.31 ENCOUNTER FOR SCREENING MAMMOGRAM FOR MALIGNANT NEOPLASM OF BREAST: ICD-10-CM

## 2022-09-21 DIAGNOSIS — Z00.00 ENCOUNTER FOR ANNUAL HEALTH EXAMINATION: Primary | ICD-10-CM

## 2022-09-21 DIAGNOSIS — J43.9 PULMONARY EMPHYSEMA, UNSPECIFIED EMPHYSEMA TYPE (HCC): ICD-10-CM

## 2022-09-21 DIAGNOSIS — N18.30 STAGE 3 CHRONIC KIDNEY DISEASE, UNSPECIFIED WHETHER STAGE 3A OR 3B CKD (HCC): ICD-10-CM

## 2022-09-21 DIAGNOSIS — Z00.00 ANNUAL PHYSICAL EXAM: ICD-10-CM

## 2022-09-21 LAB
ALBUMIN SERPL-MCNC: 3.8 G/DL (ref 3.4–5)
ALBUMIN/GLOB SERPL: 1.2 {RATIO} (ref 1–2)
ALP LIVER SERPL-CCNC: 65 U/L
ALT SERPL-CCNC: 21 U/L
ANION GAP SERPL CALC-SCNC: 10 MMOL/L (ref 0–18)
AST SERPL-CCNC: 22 U/L (ref 15–37)
BASOPHILS # BLD AUTO: 0.08 X10(3) UL (ref 0–0.2)
BASOPHILS NFR BLD AUTO: 1.2 %
BILIRUB SERPL-MCNC: 1.2 MG/DL (ref 0.1–2)
BUN BLD-MCNC: 18 MG/DL (ref 7–18)
BUN/CREAT SERPL: 16.5 (ref 10–20)
CALCIUM BLD-MCNC: 9.6 MG/DL (ref 8.5–10.1)
CHLORIDE SERPL-SCNC: 97 MMOL/L (ref 98–112)
CHOLEST SERPL-MCNC: 225 MG/DL (ref ?–200)
CO2 SERPL-SCNC: 29 MMOL/L (ref 21–32)
CREAT BLD-MCNC: 1.09 MG/DL
DEPRECATED RDW RBC AUTO: 47.8 FL (ref 35.1–46.3)
EOSINOPHIL # BLD AUTO: 0.19 X10(3) UL (ref 0–0.7)
EOSINOPHIL NFR BLD AUTO: 2.9 %
ERYTHROCYTE [DISTWIDTH] IN BLOOD BY AUTOMATED COUNT: 13.5 % (ref 11–15)
FASTING PATIENT LIPID ANSWER: NO
FASTING STATUS PATIENT QL REPORTED: NO
GFR SERPLBLD BASED ON 1.73 SQ M-ARVRAT: 55 ML/MIN/1.73M2 (ref 60–?)
GLOBULIN PLAS-MCNC: 3.2 G/DL (ref 2.8–4.4)
GLUCOSE BLD-MCNC: 82 MG/DL (ref 70–99)
HCT VFR BLD AUTO: 41.4 %
HDLC SERPL-MCNC: 84 MG/DL (ref 40–59)
HGB BLD-MCNC: 13.9 G/DL
IMM GRANULOCYTES # BLD AUTO: 0.02 X10(3) UL (ref 0–1)
IMM GRANULOCYTES NFR BLD: 0.3 %
LDLC SERPL CALC-MCNC: 119 MG/DL (ref ?–100)
LYMPHOCYTES # BLD AUTO: 1.87 X10(3) UL (ref 1–4)
LYMPHOCYTES NFR BLD AUTO: 29 %
MCH RBC QN AUTO: 32.3 PG (ref 26–34)
MCHC RBC AUTO-ENTMCNC: 33.6 G/DL (ref 31–37)
MCV RBC AUTO: 96.1 FL
MONOCYTES # BLD AUTO: 0.64 X10(3) UL (ref 0.1–1)
MONOCYTES NFR BLD AUTO: 9.9 %
NEUTROPHILS # BLD AUTO: 3.65 X10 (3) UL (ref 1.5–7.7)
NEUTROPHILS # BLD AUTO: 3.65 X10(3) UL (ref 1.5–7.7)
NEUTROPHILS NFR BLD AUTO: 56.7 %
NONHDLC SERPL-MCNC: 141 MG/DL (ref ?–130)
OSMOLALITY SERPL CALC.SUM OF ELEC: 283 MOSM/KG (ref 275–295)
PLATELET # BLD AUTO: 214 10(3)UL (ref 150–450)
POTASSIUM SERPL-SCNC: 3.4 MMOL/L (ref 3.5–5.1)
PROT SERPL-MCNC: 7 G/DL (ref 6.4–8.2)
RBC # BLD AUTO: 4.31 X10(6)UL
SODIUM SERPL-SCNC: 136 MMOL/L (ref 136–145)
TRIGL SERPL-MCNC: 126 MG/DL (ref 30–149)
TSI SER-ACNC: 1.84 MIU/ML (ref 0.36–3.74)
VLDLC SERPL CALC-MCNC: 22 MG/DL (ref 0–30)
WBC # BLD AUTO: 6.5 X10(3) UL (ref 4–11)

## 2022-09-21 PROCEDURE — G0439 PPPS, SUBSEQ VISIT: HCPCS | Performed by: INTERNAL MEDICINE

## 2022-09-21 PROCEDURE — 36415 COLL VENOUS BLD VENIPUNCTURE: CPT | Performed by: INTERNAL MEDICINE

## 2022-09-21 PROCEDURE — 1126F AMNT PAIN NOTED NONE PRSNT: CPT | Performed by: INTERNAL MEDICINE

## 2022-09-27 RX ORDER — TRIAMTERENE AND HYDROCHLOROTHIAZIDE 37.5; 25 MG/1; MG/1
1 TABLET ORAL DAILY
Qty: 90 TABLET | Refills: 1 | Status: SHIPPED | OUTPATIENT
Start: 2022-09-27 | End: 2023-03-25

## 2022-09-27 NOTE — TELEPHONE ENCOUNTER
Refill passed per [facility] protocol. Requested Prescriptions   Pending Prescriptions Disp Refills    TRIAMTERENE-HCTZ 37.5-25 MG Oral Tab [Pharmacy Med Name: Triamterene/Hydrochlorothiazide 37.5-25 Mg Tab Zydu] 90 tablet 0     Sig: Take 1 tablet by mouth daily. Hypertensive Medications Protocol Passed - 9/27/2022  1:31 AM        Passed - In person appointment in the past 12 or next 3 months       Recent Outpatient Visits              6 days ago Encounter for annual health examination    Luis Fernando Bernal MD    Office Visit    6 months ago Primary hypertension    [facility], Kamran Matias MD    Office Visit    8 months ago Pain in right hand    [address] [facility],[address] Floor, [address], [place] Yeh MD    Office Visit    10 months ago [facility], [address], Kamran Matias MD    Office Visit    10 months ago Deep vein thrombosis (DVT) of left lower extremity, unspecified chronicity, unspecified vein [facility] AND [facility] Hematology Oncology Anna Okeefe MD    Office Visit     Future Appointments         Provider Department Appt Notes    In 1 month Anna Okeefe MD [facility] AND [facility] Hematology Oncology FOLLOW UP VISIT. CL  1y    In 2 months Lester Schumacher MD [place], [address] Follow up, policy informed     In 5 months Azael Chisholm MD [facility], [address], [place] 6 MONTH FU                Passed - Last BP reading less than 140/90     BP Readings from Last 1 Encounters:  09/21/22 : 137/68                Passed - CMP or BMP in past 6 months     Recent Results (from the past 4392 hour(s))   COMP METABOLIC PANEL (14)    Collection Time: 09/21/22  2:25 PM   Result Value Ref Range    Glucose 82 70 - 99 mg/dL    Sodium 136 136 - 145 mmol/L    Potassium 3.4 (L) 3.5 - 5.1 mmol/L    Chloride 97 (L) 98 - 112 mmol/L    CO2 29.0 21.0 - 32.0 mmol/L    Anion Gap 10 0 - 18 mmol/L    BUN 18 7 - 18 mg/dL    Creatinine 1.09 (H) 0.55 - 1.02 mg/dL    BUN/CREA Ratio 16.5 10.0 - 20.0    Calcium, Total 9.6 8.5 - 10.1 mg/dL    Calculated Osmolality 283 275 - 295 mOsm/kg    eGFR-Cr 55 (L) >=60 mL/min/1.73m2    ALT 21 13 - 56 U/L    AST 22 15 - 37 U/L    Alkaline Phosphatase 65 55 - 142 U/L    Bilirubin, Total 1.2 0.1 - 2.0 mg/dL    Total Protein 7.0 6.4 - 8.2 g/dL    Albumin 3.8 3.4 - 5.0 g/dL    Globulin  3.2 2.8 - 4.4 g/dL    A/G Ratio 1.2 1.0 - 2.0    Patient Fasting for CMP? No      *Note: Due to a large number of results and/or encounters for the requested time period, some results have not been displayed. A complete set of results can be found in Results Review. Passed - In person appointment or virtual visit in the past 6 months       Recent Outpatient Visits              6 days ago Encounter for annual health examination    Aimee Gerber MD    Office Visit    6 months ago Primary hypertension    Mecca Robb MD    Office Visit    8 months ago Pain in right hand    [address] [facility],[address] Floor, [address], Millie Castrejon MD    Office Visit    10 months ago Tomasz Toscano, [address], Mecca Oh MD    Office Visit    10 months ago Deep vein thrombosis (DVT) of left lower extremity, unspecified chronicity, unspecified vein [facility])    [facility] Hematology Oncology Dean Marcelino MD    Office Visit     Future Appointments         Provider Department Appt Notes    In 1 month Dean Marcelino MD [facility] Hematology Oncology FOLLOW UP VISIT. CL  1y    In 2 months Johnie Schumacher MD [place], [facility] Follow up, policy informed     In 5 months Fransisca Watkins MD [facility], [facility], [address], [address] 6 MONTH FU                Passed - EGFRCR or GFRNAA > 50     GFR Evaluation  EGFRCR: 55 , resulted on 9/21/2022                  Recent Outpatient Visits              6 days ago Encounter for annual health examination    Caleb Villatoro MD    Office Visit    6 months ago Primary hypertension    Talib Quintero MD    Office Visit    8 months ago Pain in right hand    [address] [facility],2[address] Floor, [address], Jyoti Gardner MD    Office Visit    10 months ago [facility], [address], Talib Rdz MD    Office Visit    10 months ago Deep vein thrombosis (DVT) of left lower extremity, unspecified chronicity, unspecified vein [facility] AND [facility] Hematology Oncology Delilah Summers MD    Office Visit            Future Appointments         Provider Department Appt Notes    In 1 month [address] Hematology Oncology FOLLOW UP VISIT. CL  1y    In 2 months MD Brant Kyle [address] Follow up, policy informed     In 5 months Martínez Goddard MD [facility], [facility], [address], [place] 6 MONTH FU

## 2022-10-13 ENCOUNTER — HOSPITAL ENCOUNTER (OUTPATIENT)
Dept: MAMMOGRAPHY | Age: 72
Discharge: HOME | End: 2022-10-13
Attending: INTERNAL MEDICINE
Payer: MEDICARE

## 2022-10-13 DIAGNOSIS — Z12.31 ENCOUNTER FOR SCREENING MAMMOGRAM FOR MALIGNANT NEOPLASM OF BREAST: ICD-10-CM

## 2022-10-13 PROCEDURE — 77067 SCR MAMMO BI INCL CAD: CPT | Performed by: INTERNAL MEDICINE

## 2022-10-13 PROCEDURE — 77063 BREAST TOMOSYNTHESIS BI: CPT | Performed by: INTERNAL MEDICINE

## 2022-10-16 RX ORDER — CARVEDILOL 3.12 MG/1
3.12 TABLET ORAL 2 TIMES DAILY WITH MEALS
Qty: 180 TABLET | Refills: 1 | Status: SHIPPED | OUTPATIENT
Start: 2022-10-16 | End: 2023-04-12

## 2022-10-16 NOTE — TELEPHONE ENCOUNTER
Refill passed per IWT protocol. Requested Prescriptions   Pending Prescriptions Disp Refills    CARVEDILOL 3.125 MG Oral Tab [Pharmacy Med Name: Carvedilol 3.125 Mg Tab Zydu] 180 tablet 0     Sig: Take 1 tablet (3.125 mg total) by mouth 2 (two) times daily with meals. Hypertensive Medications Protocol Passed - 10/15/2022  1:31 AM        Passed - In person appointment in the past 12 or next 3 months       Recent Outpatient Visits              3 weeks ago Encounter for annual health examination    Cristiano Parekh MD    Office Visit    7 months ago Primary hypertension    Dorian Otero, Ced Head MD    Office Visit    9 months ago Pain in right hand    [facility] [facility],2Nd Floor, [address], Trixie Chavez MD    Office Visit    10 months ago [facility], [address], Ced Head MD    Office Visit    11 months ago Deep vein thrombosis (DVT) of left lower extremity, unspecified chronicity, unspecified vein [facility] AND [facility] Hematology Oncology Dayana Boss MD    Office Visit     Future Appointments         Provider Department Appt Notes    In 1 month Dayana Boss MD [facility] Hematology Oncology FOLLOW UP VISIT. CL  1y    In 1 month [facility]. 220 [facility]     In 2 months Sumeet Bains MD [place] 84 Follow up, policy informed     In 5 months Charlie Lubin MD [employer] [facility], [address], [place] 6 MONTH FU                Passed - Last BP reading less than 140/90     BP Readings from Last 1 Encounters:  09/21/22 : 137/68                Passed - CMP or BMP in past 6 months     Recent Results (from the past 4392 hour(s))   COMP METABOLIC PANEL (14)    Collection Time: 09/21/22  2:25 PM   Result Value Ref Range    Glucose 82 70 - 99 mg/dL    Sodium 136 136 - 145 mmol/L    Potassium 3.4 (L) 3.5 - 5.1 mmol/L    Chloride 97 (L) 98 - 112 mmol/L    CO2 29.0 21.0 - 32.0 mmol/L    Anion Gap 10 0 - 18 mmol/L    BUN 18 7 - 18 mg/dL    Creatinine 1.09 (H) 0.55 - 1.02 mg/dL    BUN/CREA Ratio 16.5 10.0 - 20.0    Calcium, Total 9.6 8.5 - 10.1 mg/dL    Calculated Osmolality 283 275 - 295 mOsm/kg    eGFR-Cr 55 (L) >=60 mL/min/1.73m2    ALT 21 13 - 56 U/L    AST 22 15 - 37 U/L    Alkaline Phosphatase 65 55 - 142 U/L    Bilirubin, Total 1.2 0.1 - 2.0 mg/dL    Total Protein 7.0 6.4 - 8.2 g/dL    Albumin 3.8 3.4 - 5.0 g/dL    Globulin  3.2 2.8 - 4.4 g/dL    A/G Ratio 1.2 1.0 - 2.0    Patient Fasting for CMP? No      *Note: Due to a large number of results and/or encounters for the requested time period, some results have not been displayed. A complete set of results can be found in Results Review. Passed - In person appointment or virtual visit in the past 6 months       Recent Outpatient Visits              3 weeks ago Encounter for annual health examination    Saranya Fall MD    Office Visit    7 months ago Primary hypertension    [facility], Vivian Christopher MD    Office Visit    9 months ago Pain in right hand    [facility] [facility],2Nd Floor, [address], Jona Merrill MD    Office Visit    10 months ago [employer], [address], Vivian Christopher MD    Office Visit    11 months ago Deep vein thrombosis (DVT) of left lower extremity, unspecified chronicity, unspecified vein [facility])    [facility] AND [facility] Hematology Oncology Denver Lopes, MD    Office Visit     Future Appointments         Provider Department Appt Notes    In 1 month Denver Lopes, MD [facility] AND [facility] Hematology Oncology FOLLOW UP VISIT. CL  1y    In 1 month [facility]. 220 [facility]     In 2 months Andres Jensne MD [facility] Medical Office [facility], [facility] 84 Follow up, policy informed     In 5 months Paulo Toscano MD [employer], [address], [place] 6 [address] or GFRNAA > 50     GFR Evaluation  EGFRCR: 55 , resulted on 9/21/2022                  Recent Outpatient Visits              3 weeks ago Encounter for annual health examination    [place]a Bob MD    Office Visit    7 months ago Primary hypertension    [facility], Silvana Mendez MD    Office Visit    9 months ago Pain in right hand    [address] [facility],2Nd Floor, [address], Matti Whyte MD    Office Visit    10 months ago [address] [facility] [address], [address], Silvana Mendez MD    Office Visit    11 months ago Deep vein thrombosis (DVT) of left lower extremity, unspecified chronicity, unspecified vein [facility])    [facility] AND CLINICS Hematology Oncology Renetta Dimas MD    Office Visit            Future Appointments         Provider Department Appt Notes    In 1 month Zander Horn 19 Hematology Oncology FOLLOW UP VISIT. CL  1y    In 1 month [facility] [facility]     In 2 months Ced Nuñez MD [facility] 84 Follow up, policy informed     In 5 months Paulo Toscano MD [employer], [address], [place] 6 MONTH FU

## 2022-11-15 ENCOUNTER — APPOINTMENT (OUTPATIENT)
Dept: HEMATOLOGY/ONCOLOGY | Facility: HOSPITAL | Age: 72
End: 2022-11-15
Attending: INTERNAL MEDICINE
Payer: MEDICARE

## 2022-11-18 ENCOUNTER — OFFICE VISIT (OUTPATIENT)
Dept: HEMATOLOGY/ONCOLOGY | Facility: HOSPITAL | Age: 72
End: 2022-11-18
Attending: INTERNAL MEDICINE
Payer: MEDICARE

## 2022-11-18 VITALS
DIASTOLIC BLOOD PRESSURE: 85 MMHG | HEIGHT: 67 IN | SYSTOLIC BLOOD PRESSURE: 152 MMHG | WEIGHT: 175 LBS | OXYGEN SATURATION: 98 % | BODY MASS INDEX: 27.47 KG/M2 | TEMPERATURE: 98 F | HEART RATE: 65 BPM | RESPIRATION RATE: 18 BRPM

## 2022-11-18 DIAGNOSIS — I82.402 DEEP VEIN THROMBOSIS (DVT) OF LEFT LOWER EXTREMITY, UNSPECIFIED CHRONICITY, UNSPECIFIED VEIN (HCC): Primary | ICD-10-CM

## 2022-11-18 DIAGNOSIS — Z79.01 ANTICOAGULATION MANAGEMENT ENCOUNTER: ICD-10-CM

## 2022-11-18 DIAGNOSIS — Z51.81 ANTICOAGULATION MANAGEMENT ENCOUNTER: ICD-10-CM

## 2022-11-18 PROCEDURE — 99214 OFFICE O/P EST MOD 30 MIN: CPT | Performed by: INTERNAL MEDICINE

## 2022-12-01 ENCOUNTER — TELEPHONE (OUTPATIENT)
Dept: PULMONOLOGY | Facility: CLINIC | Age: 72
End: 2022-12-01

## 2022-12-01 NOTE — TELEPHONE ENCOUNTER
Patient is due for Ct [facility] in December, has an appointment scheduled for 12/7/22.  Reminder letter sent

## 2022-12-07 ENCOUNTER — HOSPITAL ENCOUNTER (OUTPATIENT)
Dept: CT IMAGING | Facility: HOSPITAL | Age: 72
Discharge: HOME | End: 2022-12-07
Attending: INTERNAL MEDICINE
Payer: MEDICARE

## 2022-12-07 DIAGNOSIS — Z87.891 PERSONAL HISTORY OF TOBACCO USE, PRESENTING HAZARDS TO HEALTH: ICD-10-CM

## 2022-12-07 PROCEDURE — 71271 CT THORAX LUNG CANCER SCR C-: CPT | Performed by: INTERNAL MEDICINE

## 2022-12-19 ENCOUNTER — OFFICE VISIT (OUTPATIENT)
Dept: PULMONOLOGY | Facility: CLINIC | Age: 72
End: 2022-12-19
Payer: MEDICARE

## 2022-12-19 VITALS
OXYGEN SATURATION: 100 % | SYSTOLIC BLOOD PRESSURE: 149 MMHG | HEART RATE: 63 BPM | DIASTOLIC BLOOD PRESSURE: 78 MMHG | BODY MASS INDEX: 27 KG/M2 | WEIGHT: 174 LBS

## 2022-12-19 DIAGNOSIS — J43.9 PULMONARY EMPHYSEMA, UNSPECIFIED EMPHYSEMA TYPE (HCC): Primary | ICD-10-CM

## 2022-12-19 PROCEDURE — 1126F AMNT PAIN NOTED NONE PRSNT: CPT | Performed by: INTERNAL MEDICINE

## 2022-12-19 PROCEDURE — 99213 OFFICE O/P EST LOW 20 MIN: CPT | Performed by: INTERNAL MEDICINE

## 2022-12-19 NOTE — PROGRESS NOTES
The patient is a 72-year-old female who I know well from prior evaluation who comes in now for follow-up. She continues to smoke half pack per day. Again she was advised to quit. She otherwise is doing about the same. Review of Systems:  Vision normal. Ear nose and throat normal. Bowel normal. Bladder function normal. No depression. No thyroid disease. No lymphatic system concerns. No rash. Muscles and joints unremarkable. No weight loss no weight gain. Physical Examination:  Vital signs normal. HEENT examination is unremarkable with pupils equal round and reactive to light and accommodation. Neck without adenopathy, thyromegaly, JVD nor bruit. Lungs diminished to auscultation and percussion. Cardiac regular rate and rhythm no murmur. Abdomen nontender, without hepatosplenomegaly and no mass appreciable. Extremities and Musculoskeletal without clubbing cyanosis nor edema, and mobility acceptable. Neurologic grossly intact with symmetric tone and strength and reflex. Assessment and plan:  1. COPD-again patient is still smoking and again she is advised to quit. She can continue current medical regime and I encouraged her to start taking the Wellbutrin which she actually picked up last year but never took. See me again at the 1 year interval or sooner if needed and contact me promptly if new trouble. Annual flu shot and COVID booster.     2.  Multiple tiny pulmonary nodules-following with the low-dose CT program.    3.  DVT-on Xarelto

## 2023-03-22 ENCOUNTER — OFFICE VISIT (OUTPATIENT)
Dept: INTERNAL MEDICINE CLINIC | Facility: CLINIC | Age: 73
End: 2023-03-22

## 2023-03-22 VITALS
HEART RATE: 64 BPM | TEMPERATURE: 97 F | OXYGEN SATURATION: 99 % | SYSTOLIC BLOOD PRESSURE: 135 MMHG | WEIGHT: 174 LBS | DIASTOLIC BLOOD PRESSURE: 80 MMHG | HEIGHT: 67 IN | BODY MASS INDEX: 27.31 KG/M2

## 2023-03-22 DIAGNOSIS — I10 PRIMARY HYPERTENSION: Primary | ICD-10-CM

## 2023-03-22 LAB
ANION GAP SERPL CALC-SCNC: 8 MMOL/L (ref 0–18)
BUN BLD-MCNC: 17 MG/DL (ref 7–18)
BUN/CREAT SERPL: 16 (ref 10–20)
CALCIUM BLD-MCNC: 9.6 MG/DL (ref 8.5–10.1)
CHLORIDE SERPL-SCNC: 95 MMOL/L (ref 98–112)
CO2 SERPL-SCNC: 31 MMOL/L (ref 21–32)
CREAT BLD-MCNC: 1.06 MG/DL
FASTING STATUS PATIENT QL REPORTED: NO
GFR SERPLBLD BASED ON 1.73 SQ M-ARVRAT: 57 ML/MIN/1.73M2 (ref 60–?)
GLUCOSE BLD-MCNC: 81 MG/DL (ref 70–99)
OSMOLALITY SERPL CALC.SUM OF ELEC: 279 MOSM/KG (ref 275–295)
POTASSIUM SERPL-SCNC: 3.4 MMOL/L (ref 3.5–5.1)
SODIUM SERPL-SCNC: 134 MMOL/L (ref 136–145)

## 2023-03-22 PROCEDURE — 1126F AMNT PAIN NOTED NONE PRSNT: CPT | Performed by: INTERNAL MEDICINE

## 2023-03-22 PROCEDURE — 36415 COLL VENOUS BLD VENIPUNCTURE: CPT | Performed by: INTERNAL MEDICINE

## 2023-03-22 PROCEDURE — 99213 OFFICE O/P EST LOW 20 MIN: CPT | Performed by: INTERNAL MEDICINE

## 2023-03-22 RX ORDER — ALBUTEROL SULFATE 90 UG/1
2 AEROSOL, METERED RESPIRATORY (INHALATION) EVERY 6 HOURS PRN
Qty: 2 EACH | Refills: 0 | Status: SHIPPED | OUTPATIENT
Start: 2023-03-22

## 2023-03-23 NOTE — PROGRESS NOTES
Pt informed her  creatinin is about the same , avoid nephrotoxic drugs , ibuprofen, aleeve , will repeat bmp in 6 months , her k is low , I dante send kcl to replace, eat more k rich food.

## 2023-03-25 RX ORDER — TRIAMTERENE AND HYDROCHLOROTHIAZIDE 37.5; 25 MG/1; MG/1
1 TABLET ORAL DAILY
Qty: 90 TABLET | Refills: 3 | Status: SHIPPED | OUTPATIENT
Start: 2023-03-25

## 2023-03-25 NOTE — TELEPHONE ENCOUNTER
Refill passed per [place] protocol.      Requested Prescriptions   Pending Prescriptions Disp Refills    TRIAMTERENE-HCTZ 37.5-25 MG Oral Tab [Pharmacy Med Name: Triamterene/Hydrochlorothiazide 37.5-25 Mg Tab Zydu] 90 tablet 0     Sig: TAKE ONE TABLET BY MOUTH ONE TIME DAILY       Hypertensive Medications Protocol Passed - 3/25/2023  1:31 AM        Passed - In person appointment in the past 12 or next 3 months     Recent Outpatient Visits              3 days ago Primary hypertension    [address], [address], [place] MD    Office Visit    3 months ago Pulmonary emphysema, unspecified emphysema type [facility])    Prasad Yañez MD    Office Visit    4 months ago Deep vein thrombosis (DVT) of left lower extremity, unspecified chronicity, unspecified vein [facility])    [facility] AND CLINICS Hematology Oncology Dennie Back, MD    Office Visit    6 months ago Encounter for annual health examination    Radha Vogel MD    Office Visit    1 year ago Primary hypertension    Radha Vogel MD    Office Visit          Future Appointments         Provider Department Appt Notes    In 5 months Kathleen Jay MD [address], [address], [facility]     In 8 months Magaly Jewell MD [address], 602 [facility] yearly               Passed - Last BP reading less than 140/90     BP Readings from Last 1 Encounters:  03/22/23 : 135/80              Passed - CMP or BMP in past 6 months     Recent Results (from the past 4392 hour(s))   BASIC METABOLIC PANEL (8)    Collection Time: 03/22/23  1:32 PM   Result Value Ref Range    Glucose 81 70 - 99 mg/dL    Sodium 134 (L) 136 - 145 mmol/L    Potassium 3.4 (L) 3.5 - 5.1 mmol/L    Chloride 95 (L) 98 - 112 mmol/L    CO2 31.0 21.0 - 32.0 mmol/L Anion Gap 8 0 - 18 mmol/L    BUN 17 7 - 18 mg/dL    Creatinine 1.06 (H) 0.55 - 1.02 mg/dL    BUN/CREA Ratio 16.0 10.0 - 20.0    Calcium, Total 9.6 8.5 - 10.1 mg/dL    Calculated Osmolality 279 275 - 295 mOsm/kg    eGFR-Cr 57 (L) >=60 mL/min/1.73m2    Patient Fasting for BMP? No      *Note: Due to a large number of results and/or encounters for the requested time period, some results have not been displayed. A complete set of results can be found in Results Review.                Passed - In person appointment or virtual visit in the past 6 months     Recent Outpatient Visits              3 days ago Primary hypertension    [facility] Linda Moore MD    Office Visit    3 months ago Pulmonary emphysema, unspecified emphysema type [facility])    Franklin Roberto MD    Office Visit    4 months ago Deep vein thrombosis (DVT) of left lower extremity, unspecified chronicity, unspecified vein [facility])    [facility] AND CLINICS Hematology Oncology Robina Turner MD    Office Visit    6 months ago Encounter for annual health examination    [address], Linda Moore MD    Office Visit    1 year ago Primary hypertension    [address], Linda Moore MD    Office Visit          Future Appointments         Provider Department Appt Notes    In 5 months Gaurav Garduno MD [address], [address] [facility],3Rd Floor, [employer]     In 8 months Kalyani Pantoja MD [address], 602 [facility], [employer] yearly               Passed - [facility] or [facility] > 50     GFR Evaluation  EGFRCR: 62 , resulted on 3/22/2023                Recent Outpatient Visits              3 days ago Primary hypertension    [address], Brock Torres MD    Office Visit    3 months ago Pulmonary emphysema, unspecified emphysema type ([facility] [address] Bella Parnell MD    Office Visit    4 months ago Deep vein thrombosis (DVT) of left lower extremity, unspecified chronicity, unspecified vein [facility])    [facility] AND CLINICS Hematology Oncology Meliton العراقي MD    Office Visit    6 months ago Encounter for annual health examination    Sukhjinder Reece MD    Office Visit    1 year ago Primary hypertension    Thao Cline, Moriah White MD    Office Visit             Future Appointments         Provider Department Appt Notes    In 5 months MD Jabari Green, [address] [place] [address],3Rd Floor, [facility]     In 8 months MD Jabari Martinez, [address] [facility], [facility] yearly

## 2023-04-09 ENCOUNTER — HOSPITAL ENCOUNTER (OUTPATIENT)
Age: 73
Discharge: HOME | End: 2023-04-09
Payer: MEDICARE

## 2023-04-09 ENCOUNTER — APPOINTMENT (OUTPATIENT)
Dept: GENERAL RADIOLOGY | Age: 73
End: 2023-04-09
Attending: NURSE PRACTITIONER
Payer: MEDICARE

## 2023-04-09 VITALS
RESPIRATION RATE: 18 BRPM | DIASTOLIC BLOOD PRESSURE: 88 MMHG | TEMPERATURE: 98 F | HEART RATE: 68 BPM | OXYGEN SATURATION: 97 % | SYSTOLIC BLOOD PRESSURE: 154 MMHG

## 2023-04-09 DIAGNOSIS — S93.402A MODERATE LEFT ANKLE SPRAIN, INITIAL ENCOUNTER: ICD-10-CM

## 2023-04-09 DIAGNOSIS — R60.9 SWELLING: ICD-10-CM

## 2023-04-09 DIAGNOSIS — S92.902A CLOSED FRACTURE OF LEFT FOOT, INITIAL ENCOUNTER: Primary | ICD-10-CM

## 2023-04-09 PROCEDURE — 73610 X-RAY EXAM OF ANKLE: CPT | Performed by: NURSE PRACTITIONER

## 2023-04-09 PROCEDURE — 99203 OFFICE O/P NEW LOW 30 MIN: CPT | Performed by: NURSE PRACTITIONER

## 2023-04-09 PROCEDURE — 73630 X-RAY EXAM OF FOOT: CPT | Performed by: NURSE PRACTITIONER

## 2023-04-09 NOTE — DISCHARGE INSTRUCTIONS
Seen in immediate care today for a ankle and foot injury that occurred a couple of days ago. He did have x-rays done of both your foot and ankle there is no fracture in your ankle. You do have a fracture of the bone on your foot. The splint was applied. Please continue to use your walker to apply minimal weight on that foot. Please call to see orthopedics as soon as possible.

## 2023-04-10 ENCOUNTER — TELEPHONE (OUTPATIENT)
Dept: ORTHOPEDICS CLINIC | Facility: CLINIC | Age: 73
End: 2023-04-10

## 2023-04-10 NOTE — TELEPHONE ENCOUNTER
Patient has a podiatrist to call in [place] since this is a fx foot.  She will call her for apptointment

## 2023-04-11 NOTE — TELEPHONE ENCOUNTER
LVM for patient to call the office back if still in need of an appt for foot fracture.      Phone room if patient calls back today 4/11/23 prior to 6pm and is requesting an appt please attempt to transfer to RN [id number]

## 2023-04-12 RX ORDER — CARVEDILOL 3.12 MG/1
3.12 TABLET ORAL 2 TIMES DAILY WITH MEALS
Qty: 180 TABLET | Refills: 3 | Status: SHIPPED | OUTPATIENT
Start: 2023-04-12

## 2023-04-12 NOTE — TELEPHONE ENCOUNTER
Please review; protocol failed/ no protocol  Medication pended for your review and approval.    Requested Prescriptions   Pending Prescriptions Disp Refills    CARVEDILOL 3.125 MG Oral Tab [Pharmacy Med Name: Carvedilol 3.125 Mg Tab Zydu] 180 tablet 0     Sig: Take 1 tablet (3.125 mg total) by mouth 2 (two) times daily with meals.        Hypertensive Medications Protocol Failed - 4/12/2023  1:31 AM        Failed - Last BP reading less than 140/90     BP Readings from Last 1 Encounters:  04/09/23 : 154/88                Passed - In person appointment in the past 12 or next 3 months     Recent Outpatient Visits              3 weeks ago Primary hypertension    [address] [place], [place] [address]Ajit MD    Office Visit    3 months ago Pulmonary emphysema, unspecified emphysema type [facility])    Wally Padilla MD    Office Visit    4 months ago Deep vein thrombosis (DVT) of left lower extremity, unspecified chronicity, unspecified vein [facility])    [facility] AND CLINICS Hematology Oncology Fide Mccarty MD    Office Visit    6 months ago Encounter for annual health examination    [address] [place], [place] [address]Ajit MD    Office Visit    1 year ago Primary hypertension    [address] [place], [place] [address]Ajit MD    Office Visit          Future Appointments         Provider Department Appt Notes    Tomorrow Oliverio Hewitt MD [facility], [address], [place] Pre-op, fractured ankle and broken foot,policy informed    In 5 months MD Jania Whitmore, [address] [facility],3Rd Floor, [address]     In 8 months MD Jania Paiz, [address] [facility], [address] yearly               Passed - CMP or BMP in past 6 months     Recent Results (from the past 4392 hour(s))   BASIC METABOLIC PANEL (8)    Collection Time: 03/22/23  1:32 PM Result Value Ref Range    Glucose 81 70 - 99 mg/dL    Sodium 134 (L) 136 - 145 mmol/L    Potassium 3.4 (L) 3.5 - 5.1 mmol/L    Chloride 95 (L) 98 - 112 mmol/L    CO2 31.0 21.0 - 32.0 mmol/L    Anion Gap 8 0 - 18 mmol/L    BUN 17 7 - 18 mg/dL    Creatinine 1.06 (H) 0.55 - 1.02 mg/dL    BUN/CREA Ratio 16.0 10.0 - 20.0    Calcium, Total 9.6 8.5 - 10.1 mg/dL    Calculated Osmolality 279 275 - 295 mOsm/kg    eGFR-Cr 57 (L) >=60 mL/min/1.73m2    Patient Fasting for BMP? No      *Note: Due to a large number of results and/or encounters for the requested time period, some results have not been displayed. A complete set of results can be found in Results Review.                  Passed - In person appointment or virtual visit in the past 6 months     Recent Outpatient Visits              3 weeks ago Primary hypertension    Sravani Dawson MD    Office Visit    3 months ago Pulmonary emphysema, unspecified emphysema type [facility])    Danny Mccarty MD    Office Visit    4 months ago Deep vein thrombosis (DVT) of left lower extremity, unspecified chronicity, unspecified vein [facility])    [facility] Hematology Oncology Taylor Olsen MD    Office Visit    6 months ago Encounter for annual health examination    Sravani Dawson MD    Office Visit    1 year ago Primary hypertension    Sravani Dawson MD    Office Visit          Future Appointments         Provider Department Appt Notes    Tomorrow MD Trista Bailey [place] Pre-op, fractured ankle and broken foot,policy informed    In 5 months MD Ignacio Tavarez, [address] [facility],3Rd Floor, [facility]     In 8 months MD Ignacio Fernández, 602 [facility] yearly Passed - EGFRCR or GFRNAA > 50     GFR Evaluation  EGFRCR: 57 , resulted on 3/22/2023

## 2023-04-13 ENCOUNTER — OFFICE VISIT (OUTPATIENT)
Dept: INTERNAL MEDICINE CLINIC | Facility: CLINIC | Age: 73
End: 2023-04-13

## 2023-04-13 VITALS
OXYGEN SATURATION: 98 % | HEART RATE: 68 BPM | DIASTOLIC BLOOD PRESSURE: 62 MMHG | SYSTOLIC BLOOD PRESSURE: 118 MMHG | BODY MASS INDEX: 26.37 KG/M2 | RESPIRATION RATE: 16 BRPM | WEIGHT: 168 LBS | HEIGHT: 67 IN | TEMPERATURE: 98 F

## 2023-04-13 DIAGNOSIS — Z01.818 PRE-OP EXAM: Primary | ICD-10-CM

## 2023-04-13 PROCEDURE — 93000 ELECTROCARDIOGRAM COMPLETE: CPT | Performed by: INTERNAL MEDICINE

## 2023-04-13 PROCEDURE — 36415 COLL VENOUS BLD VENIPUNCTURE: CPT | Performed by: INTERNAL MEDICINE

## 2023-04-13 PROCEDURE — 99214 OFFICE O/P EST MOD 30 MIN: CPT | Performed by: INTERNAL MEDICINE

## 2023-04-13 PROCEDURE — 1126F AMNT PAIN NOTED NONE PRSNT: CPT | Performed by: INTERNAL MEDICINE

## 2023-04-14 ENCOUNTER — HOSPITAL ENCOUNTER (OUTPATIENT)
Dept: CV DIAGNOSTICS | Facility: HOSPITAL | Age: 73
Discharge: HOME | End: 2023-04-14
Attending: INTERNAL MEDICINE
Payer: MEDICARE

## 2023-04-14 ENCOUNTER — HOSPITAL ENCOUNTER (OUTPATIENT)
Dept: GENERAL RADIOLOGY | Facility: HOSPITAL | Age: 73
Discharge: HOME | End: 2023-04-14
Attending: INTERNAL MEDICINE
Payer: MEDICARE

## 2023-04-14 DIAGNOSIS — Z01.818 PRE-OP EXAM: ICD-10-CM

## 2023-04-14 LAB
ALBUMIN SERPL-MCNC: 3.6 G/DL (ref 3.4–5)
ALBUMIN/GLOB SERPL: 1.2 {RATIO} (ref 1–2)
ALP LIVER SERPL-CCNC: 56 U/L
ALT SERPL-CCNC: 45 U/L
ANION GAP SERPL CALC-SCNC: 8 MMOL/L (ref 0–18)
AST SERPL-CCNC: 72 U/L (ref 15–37)
BASOPHILS # BLD AUTO: 0.06 X10(3) UL (ref 0–0.2)
BASOPHILS NFR BLD AUTO: 0.9 %
BILIRUB SERPL-MCNC: 0.8 MG/DL (ref 0.1–2)
BUN BLD-MCNC: 16 MG/DL (ref 7–18)
BUN/CREAT SERPL: 15 (ref 10–20)
CALCIUM BLD-MCNC: 9.5 MG/DL (ref 8.5–10.1)
CHLORIDE SERPL-SCNC: 94 MMOL/L (ref 98–112)
CO2 SERPL-SCNC: 29 MMOL/L (ref 21–32)
CREAT BLD-MCNC: 1.07 MG/DL
DEPRECATED RDW RBC AUTO: 47.8 FL (ref 35.1–46.3)
EOSINOPHIL # BLD AUTO: 0.16 X10(3) UL (ref 0–0.7)
EOSINOPHIL NFR BLD AUTO: 2.3 %
ERYTHROCYTE [DISTWIDTH] IN BLOOD BY AUTOMATED COUNT: 13.3 % (ref 11–15)
FASTING STATUS PATIENT QL REPORTED: YES
GFR SERPLBLD BASED ON 1.73 SQ M-ARVRAT: 56 ML/MIN/1.73M2 (ref 60–?)
GLOBULIN PLAS-MCNC: 3.1 G/DL (ref 2.8–4.4)
GLUCOSE BLD-MCNC: 95 MG/DL (ref 70–99)
HCT VFR BLD AUTO: 39.6 %
HGB BLD-MCNC: 13.3 G/DL
IMM GRANULOCYTES # BLD AUTO: 0.01 X10(3) UL (ref 0–1)
IMM GRANULOCYTES NFR BLD: 0.1 %
LYMPHOCYTES # BLD AUTO: 1.51 X10(3) UL (ref 1–4)
LYMPHOCYTES NFR BLD AUTO: 21.7 %
MCH RBC QN AUTO: 32.5 PG (ref 26–34)
MCHC RBC AUTO-ENTMCNC: 33.6 G/DL (ref 31–37)
MCV RBC AUTO: 96.8 FL
MONOCYTES # BLD AUTO: 0.51 X10(3) UL (ref 0.1–1)
MONOCYTES NFR BLD AUTO: 7.3 %
NEUTROPHILS # BLD AUTO: 4.7 X10 (3) UL (ref 1.5–7.7)
NEUTROPHILS # BLD AUTO: 4.7 X10(3) UL (ref 1.5–7.7)
NEUTROPHILS NFR BLD AUTO: 67.7 %
OSMOLALITY SERPL CALC.SUM OF ELEC: 273 MOSM/KG (ref 275–295)
PLATELET # BLD AUTO: 214 10(3)UL (ref 150–450)
POTASSIUM SERPL-SCNC: 3.6 MMOL/L (ref 3.5–5.1)
PROT SERPL-MCNC: 6.7 G/DL (ref 6.4–8.2)
RBC # BLD AUTO: 4.09 X10(6)UL
SODIUM SERPL-SCNC: 131 MMOL/L (ref 136–145)
WBC # BLD AUTO: 7 X10(3) UL (ref 4–11)

## 2023-04-14 PROCEDURE — 71046 X-RAY EXAM CHEST 2 VIEWS: CPT | Performed by: INTERNAL MEDICINE

## 2023-04-14 PROCEDURE — 93306 TTE W/DOPPLER COMPLETE: CPT | Performed by: INTERNAL MEDICINE

## 2023-04-14 RX ORDER — AMLODIPINE BESYLATE 5 MG/1
5 TABLET ORAL DAILY
Qty: 30 TABLET | Refills: 1 | Status: SHIPPED | OUTPATIENT
Start: 2023-04-14 | End: 2024-04-08

## 2023-04-17 ENCOUNTER — TELEPHONE (OUTPATIENT)
Dept: HEMATOLOGY/ONCOLOGY | Facility: HOSPITAL | Age: 73
End: 2023-04-17

## 2023-04-17 NOTE — TELEPHONE ENCOUNTER
Patient has foot surgery coming up. Has been taking Xarelto 10mg every other day. Is asking if she should continue doing that after surgery? Please call patient.

## 2023-04-18 ENCOUNTER — TELEPHONE (OUTPATIENT)
Dept: INTERNAL MEDICINE CLINIC | Facility: CLINIC | Age: 73
End: 2023-04-18

## 2023-04-18 NOTE — TELEPHONE ENCOUNTER
Call to patient and advised to resume her xarelto as Dr Odell Figueredo instructed when the podiatrist tells her it is safe to after the procedure as they know the extent of the bleeding risk after their procedure - typically no more than 2 days after the procedure. I explained I provided the clearance as well to [facility]. She thanked me for the call back.

## 2023-04-24 ENCOUNTER — INITIAL APN SNF VISIT (OUTPATIENT)
Dept: INTERNAL MEDICINE CLINIC | Facility: SKILLED NURSING FACILITY | Age: 73
End: 2023-04-24

## 2023-04-24 DIAGNOSIS — Z86.718 HISTORY OF DVT (DEEP VEIN THROMBOSIS): ICD-10-CM

## 2023-04-24 DIAGNOSIS — J44.9 CHRONIC OBSTRUCTIVE PULMONARY DISEASE, UNSPECIFIED COPD TYPE (HCC): ICD-10-CM

## 2023-04-24 DIAGNOSIS — S82.892D CLOSED FRACTURE OF LEFT ANKLE WITH ROUTINE HEALING, SUBSEQUENT ENCOUNTER: ICD-10-CM

## 2023-04-24 DIAGNOSIS — I10 PRIMARY HYPERTENSION: ICD-10-CM

## 2023-04-24 DIAGNOSIS — S92.352D CLOSED DISPLACED FRACTURE OF FIFTH METATARSAL BONE OF LEFT FOOT WITH ROUTINE HEALING, SUBSEQUENT ENCOUNTER: ICD-10-CM

## 2023-04-25 PROCEDURE — 99305 1ST NF CARE MODERATE MDM 35: CPT | Performed by: INTERNAL MEDICINE

## 2023-04-26 ENCOUNTER — EXTERNAL FACILITY (OUTPATIENT)
Dept: INTERNAL MEDICINE CLINIC | Facility: CLINIC | Age: 73
End: 2023-04-26

## 2023-04-26 ENCOUNTER — EXTERNAL FACILITY (OUTPATIENT)
Dept: PULMONOLOGY | Facility: CLINIC | Age: 73
End: 2023-04-26

## 2023-04-26 DIAGNOSIS — R91.8 MULTIPLE PULMONARY NODULES: ICD-10-CM

## 2023-04-26 DIAGNOSIS — J43.9 PULMONARY EMPHYSEMA, UNSPECIFIED EMPHYSEMA TYPE (HCC): Primary | ICD-10-CM

## 2023-04-26 DIAGNOSIS — S82.892D CLOSED FRACTURE OF LEFT ANKLE WITH ROUTINE HEALING, SUBSEQUENT ENCOUNTER: ICD-10-CM

## 2023-04-26 DIAGNOSIS — I10 HYPERTENSION, BENIGN: ICD-10-CM

## 2023-04-26 DIAGNOSIS — Z72.0 TOBACCO ABUSE: ICD-10-CM

## 2023-04-26 DIAGNOSIS — Z86.718 HISTORY OF DVT (DEEP VEIN THROMBOSIS): ICD-10-CM

## 2023-04-27 ENCOUNTER — EXTERNAL FACILITY (OUTPATIENT)
Dept: INTERNAL MEDICINE CLINIC | Facility: CLINIC | Age: 73
End: 2023-04-27

## 2023-04-27 DIAGNOSIS — N18.30 STAGE 3 CHRONIC KIDNEY DISEASE, UNSPECIFIED WHETHER STAGE 3A OR 3B CKD (HCC): ICD-10-CM

## 2023-04-27 DIAGNOSIS — S82.892D CLOSED FRACTURE OF LEFT ANKLE WITH ROUTINE HEALING, SUBSEQUENT ENCOUNTER: ICD-10-CM

## 2023-04-27 DIAGNOSIS — I10 HYPERTENSION, BENIGN: ICD-10-CM

## 2023-04-27 DIAGNOSIS — F32.4 MAJOR DEPRESSIVE DISORDER WITH SINGLE EPISODE, IN PARTIAL REMISSION (HCC): ICD-10-CM

## 2023-04-27 PROCEDURE — 99309 SBSQ NF CARE MODERATE MDM 30: CPT | Performed by: INTERNAL MEDICINE

## 2023-05-01 ENCOUNTER — EXTERNAL FACILITY (OUTPATIENT)
Dept: PULMONOLOGY | Facility: CLINIC | Age: 73
End: 2023-05-01

## 2023-05-01 ENCOUNTER — SNF VISIT (OUTPATIENT)
Dept: INTERNAL MEDICINE CLINIC | Facility: SKILLED NURSING FACILITY | Age: 73
End: 2023-05-01

## 2023-05-01 VITALS
WEIGHT: 174 LBS | DIASTOLIC BLOOD PRESSURE: 68 MMHG | OXYGEN SATURATION: 98 % | BODY MASS INDEX: 27 KG/M2 | SYSTOLIC BLOOD PRESSURE: 132 MMHG | RESPIRATION RATE: 18 BRPM | TEMPERATURE: 98 F | HEART RATE: 68 BPM

## 2023-05-01 DIAGNOSIS — J43.9 PULMONARY EMPHYSEMA, UNSPECIFIED EMPHYSEMA TYPE (HCC): Primary | ICD-10-CM

## 2023-05-01 DIAGNOSIS — Z86.718 HISTORY OF DVT (DEEP VEIN THROMBOSIS): ICD-10-CM

## 2023-05-01 DIAGNOSIS — J43.9 PULMONARY EMPHYSEMA, UNSPECIFIED EMPHYSEMA TYPE (HCC): ICD-10-CM

## 2023-05-01 DIAGNOSIS — J44.9 CHRONIC OBSTRUCTIVE PULMONARY DISEASE, UNSPECIFIED COPD TYPE (HCC): ICD-10-CM

## 2023-05-01 DIAGNOSIS — Z72.0 TOBACCO ABUSE: ICD-10-CM

## 2023-05-01 DIAGNOSIS — S82.892D CLOSED FRACTURE OF LEFT ANKLE WITH ROUTINE HEALING, SUBSEQUENT ENCOUNTER: ICD-10-CM

## 2023-05-01 DIAGNOSIS — R91.8 MULTIPLE PULMONARY NODULES: ICD-10-CM

## 2023-05-01 DIAGNOSIS — I10 HYPERTENSION, BENIGN: ICD-10-CM

## 2023-05-02 ENCOUNTER — EXTERNAL FACILITY (OUTPATIENT)
Dept: INTERNAL MEDICINE CLINIC | Facility: CLINIC | Age: 73
End: 2023-05-02

## 2023-05-02 DIAGNOSIS — S82.892D CLOSED FRACTURE OF LEFT ANKLE WITH ROUTINE HEALING, SUBSEQUENT ENCOUNTER: ICD-10-CM

## 2023-05-02 DIAGNOSIS — N18.30 STAGE 3 CHRONIC KIDNEY DISEASE, UNSPECIFIED WHETHER STAGE 3A OR 3B CKD (HCC): ICD-10-CM

## 2023-05-02 PROCEDURE — 99308 SBSQ NF CARE LOW MDM 20: CPT | Performed by: INTERNAL MEDICINE

## 2023-05-03 ENCOUNTER — SNF VISIT (OUTPATIENT)
Dept: INTERNAL MEDICINE CLINIC | Facility: SKILLED NURSING FACILITY | Age: 73
End: 2023-05-03

## 2023-05-03 VITALS
RESPIRATION RATE: 18 BRPM | WEIGHT: 174 LBS | TEMPERATURE: 97 F | DIASTOLIC BLOOD PRESSURE: 75 MMHG | SYSTOLIC BLOOD PRESSURE: 135 MMHG | HEART RATE: 70 BPM | BODY MASS INDEX: 27 KG/M2 | OXYGEN SATURATION: 98 %

## 2023-05-03 DIAGNOSIS — S82.892S CLOSED LEFT ANKLE FRACTURE, SEQUELA: ICD-10-CM

## 2023-05-03 DIAGNOSIS — I10 PRIMARY HYPERTENSION: ICD-10-CM

## 2023-05-03 DIAGNOSIS — R52 PAIN: ICD-10-CM

## 2023-05-04 ENCOUNTER — EXTERNAL FACILITY (OUTPATIENT)
Dept: INTERNAL MEDICINE CLINIC | Facility: CLINIC | Age: 73
End: 2023-05-04

## 2023-05-04 DIAGNOSIS — N18.30 STAGE 3 CHRONIC KIDNEY DISEASE, UNSPECIFIED WHETHER STAGE 3A OR 3B CKD (HCC): ICD-10-CM

## 2023-05-04 DIAGNOSIS — I10 HYPERTENSION, BENIGN: ICD-10-CM

## 2023-05-04 DIAGNOSIS — J42 CHRONIC BRONCHITIS, UNSPECIFIED CHRONIC BRONCHITIS TYPE (HCC): ICD-10-CM

## 2023-05-04 DIAGNOSIS — I82.4Y2 ACUTE DEEP VEIN THROMBOSIS (DVT) OF PROXIMAL VEIN OF LEFT LOWER EXTREMITY (HCC): ICD-10-CM

## 2023-05-04 PROCEDURE — 99308 SBSQ NF CARE LOW MDM 20: CPT | Performed by: INTERNAL MEDICINE

## 2023-05-05 ENCOUNTER — SNF VISIT (OUTPATIENT)
Dept: INTERNAL MEDICINE CLINIC | Facility: SKILLED NURSING FACILITY | Age: 73
End: 2023-05-05

## 2023-05-05 DIAGNOSIS — E78.2 MIXED HYPERLIPIDEMIA: ICD-10-CM

## 2023-05-05 DIAGNOSIS — J44.9 CHRONIC OBSTRUCTIVE PULMONARY DISEASE, UNSPECIFIED COPD TYPE (HCC): ICD-10-CM

## 2023-05-05 DIAGNOSIS — I10 HYPERTENSION, BENIGN: ICD-10-CM

## 2023-05-05 DIAGNOSIS — S82.892D CLOSED FRACTURE OF LEFT ANKLE WITH ROUTINE HEALING, SUBSEQUENT ENCOUNTER: ICD-10-CM

## 2023-05-05 DIAGNOSIS — Z02.9 DISCHARGE PLANNING ISSUES: ICD-10-CM

## 2023-05-05 DIAGNOSIS — I82.4Y2 ACUTE DEEP VEIN THROMBOSIS (DVT) OF PROXIMAL VEIN OF LEFT LOWER EXTREMITY (HCC): ICD-10-CM

## 2023-05-08 ENCOUNTER — SNF VISIT (OUTPATIENT)
Dept: INTERNAL MEDICINE CLINIC | Facility: SKILLED NURSING FACILITY | Age: 73
End: 2023-05-08

## 2023-05-08 DIAGNOSIS — M25.531 RIGHT WRIST PAIN: ICD-10-CM

## 2023-05-08 DIAGNOSIS — J44.9 CHRONIC OBSTRUCTIVE PULMONARY DISEASE, UNSPECIFIED COPD TYPE (HCC): ICD-10-CM

## 2023-05-08 DIAGNOSIS — I10 PRIMARY HYPERTENSION: ICD-10-CM

## 2023-05-08 DIAGNOSIS — F17.200 TOBACCO USE DISORDER: ICD-10-CM

## 2023-05-08 DIAGNOSIS — I82.4Y2 ACUTE DEEP VEIN THROMBOSIS (DVT) OF PROXIMAL VEIN OF LEFT LOWER EXTREMITY (HCC): ICD-10-CM

## 2023-05-08 DIAGNOSIS — S82.892D CLOSED FRACTURE OF LEFT ANKLE WITH ROUTINE HEALING, SUBSEQUENT ENCOUNTER: ICD-10-CM

## 2023-05-10 ENCOUNTER — EXTERNAL FACILITY (OUTPATIENT)
Dept: PULMONOLOGY | Facility: CLINIC | Age: 73
End: 2023-05-10

## 2023-05-10 DIAGNOSIS — J43.9 PULMONARY EMPHYSEMA, UNSPECIFIED EMPHYSEMA TYPE (HCC): Primary | ICD-10-CM

## 2023-05-10 DIAGNOSIS — Z86.718 HISTORY OF DVT (DEEP VEIN THROMBOSIS): ICD-10-CM

## 2023-05-10 DIAGNOSIS — Z72.0 TOBACCO ABUSE: ICD-10-CM

## 2023-05-10 DIAGNOSIS — R91.8 MULTIPLE PULMONARY NODULES: ICD-10-CM

## 2023-05-10 PROCEDURE — 99309 SBSQ NF CARE MODERATE MDM 30: CPT | Performed by: PHYSICIAN ASSISTANT

## 2023-05-11 ENCOUNTER — EXTERNAL FACILITY (OUTPATIENT)
Dept: INTERNAL MEDICINE CLINIC | Facility: CLINIC | Age: 73
End: 2023-05-11

## 2023-05-11 DIAGNOSIS — N18.30 STAGE 3 CHRONIC KIDNEY DISEASE, UNSPECIFIED WHETHER STAGE 3A OR 3B CKD (HCC): ICD-10-CM

## 2023-05-11 DIAGNOSIS — I10 PRIMARY HYPERTENSION: ICD-10-CM

## 2023-05-11 DIAGNOSIS — S82.892D CLOSED FRACTURE OF LEFT ANKLE WITH ROUTINE HEALING, SUBSEQUENT ENCOUNTER: ICD-10-CM

## 2023-05-15 ENCOUNTER — SNF DISCHARGE (OUTPATIENT)
Dept: INTERNAL MEDICINE CLINIC | Facility: SKILLED NURSING FACILITY | Age: 73
End: 2023-05-15

## 2023-05-15 ENCOUNTER — MED REC SCAN ONLY (OUTPATIENT)
Dept: INTERNAL MEDICINE CLINIC | Facility: CLINIC | Age: 73
End: 2023-05-15

## 2023-05-15 DIAGNOSIS — J44.9 CHRONIC OBSTRUCTIVE PULMONARY DISEASE, UNSPECIFIED COPD TYPE (HCC): ICD-10-CM

## 2023-05-15 DIAGNOSIS — Z86.718 HISTORY OF DVT (DEEP VEIN THROMBOSIS): ICD-10-CM

## 2023-05-15 DIAGNOSIS — I10 PRIMARY HYPERTENSION: ICD-10-CM

## 2023-05-15 DIAGNOSIS — S82.892D CLOSED FRACTURE OF LEFT ANKLE WITH ROUTINE HEALING, SUBSEQUENT ENCOUNTER: ICD-10-CM

## 2023-05-18 ENCOUNTER — TELEPHONE (OUTPATIENT)
Dept: INTERNAL MEDICINE CLINIC | Facility: CLINIC | Age: 73
End: 2023-05-18

## 2023-05-18 NOTE — TELEPHONE ENCOUNTER
Pt was discharged from rehab yesterday and is looking for an appt. Next apt was 5/24 at 5pm and pt said that is too late in the day. Pt looking for sooner or earlier in the day.  Please advise

## 2023-05-31 ENCOUNTER — OFFICE VISIT (OUTPATIENT)
Dept: INTERNAL MEDICINE CLINIC | Facility: CLINIC | Age: 73
End: 2023-05-31

## 2023-05-31 VITALS
HEART RATE: 62 BPM | SYSTOLIC BLOOD PRESSURE: 126 MMHG | DIASTOLIC BLOOD PRESSURE: 74 MMHG | HEIGHT: 67 IN | WEIGHT: 180 LBS | BODY MASS INDEX: 28.25 KG/M2 | TEMPERATURE: 97 F

## 2023-05-31 DIAGNOSIS — Z78.0 MENOPAUSE: ICD-10-CM

## 2023-05-31 DIAGNOSIS — S82.892D CLOSED FRACTURE OF LEFT ANKLE WITH ROUTINE HEALING, SUBSEQUENT ENCOUNTER: Primary | ICD-10-CM

## 2023-05-31 PROCEDURE — 1125F AMNT PAIN NOTED PAIN PRSNT: CPT | Performed by: INTERNAL MEDICINE

## 2023-05-31 PROCEDURE — 99214 OFFICE O/P EST MOD 30 MIN: CPT | Performed by: INTERNAL MEDICINE

## 2023-05-31 RX ORDER — CALCITONIN SALMON 200 [IU]/.09ML
SPRAY, METERED NASAL
COMMUNITY
Start: 2023-05-18

## 2023-06-14 ENCOUNTER — MED REC SCAN ONLY (OUTPATIENT)
Dept: INTERNAL MEDICINE CLINIC | Facility: CLINIC | Age: 73
End: 2023-06-14

## 2023-07-10 ENCOUNTER — LAB ENCOUNTER (OUTPATIENT)
Dept: LAB | Age: 73
End: 2023-07-10
Attending: INTERNAL MEDICINE
Payer: MEDICARE

## 2023-07-10 DIAGNOSIS — R79.89 ELEVATED LFTS: ICD-10-CM

## 2023-07-10 DIAGNOSIS — N17.9 AKI (ACUTE KIDNEY INJURY) (HCC): ICD-10-CM

## 2023-07-10 LAB
ALBUMIN SERPL-MCNC: 3.5 G/DL (ref 3.4–5)
ALBUMIN/GLOB SERPL: 1.1 {RATIO} (ref 1–2)
ALP LIVER SERPL-CCNC: 72 U/L
ALT SERPL-CCNC: 16 U/L
ANION GAP SERPL CALC-SCNC: 6 MMOL/L (ref 0–18)
AST SERPL-CCNC: 22 U/L (ref 15–37)
BILIRUB SERPL-MCNC: 1 MG/DL (ref 0.1–2)
BUN BLD-MCNC: 17 MG/DL (ref 7–18)
CALCIUM BLD-MCNC: 9.4 MG/DL (ref 8.5–10.1)
CERULOPLASMIN SERPL-MCNC: 27.4 MG/DL (ref 20–60)
CHLORIDE SERPL-SCNC: 103 MMOL/L (ref 98–112)
CO2 SERPL-SCNC: 26 MMOL/L (ref 21–32)
CREAT BLD-MCNC: 1.14 MG/DL
FASTING STATUS PATIENT QL REPORTED: NO
GFR SERPLBLD BASED ON 1.73 SQ M-ARVRAT: 52 ML/MIN/1.73M2 (ref 60–?)
GLOBULIN PLAS-MCNC: 3.2 G/DL (ref 2.8–4.4)
GLUCOSE BLD-MCNC: 92 MG/DL (ref 70–99)
OSMOLALITY SERPL CALC.SUM OF ELEC: 281 MOSM/KG (ref 275–295)
POTASSIUM SERPL-SCNC: 3.9 MMOL/L (ref 3.5–5.1)
PROT SERPL-MCNC: 6.7 G/DL (ref 6.4–8.2)
SODIUM SERPL-SCNC: 135 MMOL/L (ref 136–145)

## 2023-07-10 PROCEDURE — 82390 ASSAY OF CERULOPLASMIN: CPT

## 2023-07-10 PROCEDURE — 87340 HEPATITIS B SURFACE AG IA: CPT

## 2023-07-10 PROCEDURE — 36415 COLL VENOUS BLD VENIPUNCTURE: CPT

## 2023-07-10 PROCEDURE — 83516 IMMUNOASSAY NONANTIBODY: CPT

## 2023-07-10 PROCEDURE — 86704 HEP B CORE ANTIBODY TOTAL: CPT

## 2023-07-10 PROCEDURE — 80053 COMPREHEN METABOLIC PANEL: CPT

## 2023-07-10 PROCEDURE — 80503 PATH CLIN CONSLTJ SF 5-20: CPT

## 2023-07-10 PROCEDURE — 86038 ANTINUCLEAR ANTIBODIES: CPT

## 2023-07-10 PROCEDURE — 86708 HEPATITIS A ANTIBODY: CPT

## 2023-07-10 PROCEDURE — 86225 DNA ANTIBODY NATIVE: CPT

## 2023-07-10 PROCEDURE — 86803 HEPATITIS C AB TEST: CPT

## 2023-07-10 PROCEDURE — 86706 HEP B SURFACE ANTIBODY: CPT

## 2023-07-10 RX ORDER — AMLODIPINE BESYLATE 5 MG/1
5 TABLET ORAL DAILY
Qty: 90 TABLET | Refills: 3 | Status: SHIPPED | OUTPATIENT
Start: 2023-07-10

## 2023-07-10 RX ORDER — AMLODIPINE BESYLATE 5 MG/1
5 TABLET ORAL DAILY
Qty: 90 TABLET | Refills: 3 | Status: SHIPPED | OUTPATIENT
Start: 2023-07-10 | End: 2023-07-10

## 2023-07-11 LAB
ACTIN SMOOTH MUSCLE AB: 5 UNITS
DSDNA IGG SERPL IA-ACNC: 1.2 IU/ML
ENA AB SER QL IA: 0.3 UG/L
ENA AB SER QL IA: NEGATIVE
HAV AB SER QL IA: NONREACTIVE
HBV CORE AB SERPL QL IA: NONREACTIVE
HBV SURFACE AB SER QL: NONREACTIVE
HBV SURFACE AB SERPL IA-ACNC: <3.1 MIU/ML
HBV SURFACE AG SERPL QL IA: NONREACTIVE
HCV AB SERPL QL IA: NONREACTIVE
M2 MITOCHONDRIAL AB: <20 UNITS

## 2023-07-11 NOTE — TELEPHONE ENCOUNTER
Refill passed per [place] [employer] [place] protocol. Per progress note by Dr. Jana Coffman 05/31/23:  htn- controlled, continue with current medications  No orders of the defined types were placed in this encounter. .  Requested Prescriptions   Pending Prescriptions Disp Refills    AMLODIPINE 5 MG Oral Tab [Pharmacy Med Name: Amlodipine Besylate 5 Mg Tab Unic] 30 tablet 0     Sig: Take 1 tablet (5 mg total) by mouth daily.        Hypertensive Medications Protocol Passed - 7/10/2023 11:38 AM        Passed - In person appointment in the past 12 or next 3 months     Recent Outpatient Visits              1 month ago Closed fracture of left ankle with routine healing, subsequent encounter    Casimiro Rosales MD    Office Visit    2 months ago Pre-op exam    Jennifer Rosales MD    Office Visit    3 months ago Primary hypertension    Melissa Rosales Austin, MD    Office Visit    6 months ago Pulmonary emphysema, unspecified emphysema type [facility])    Dino Iniguez MD    Office Visit    7 months ago Deep vein thrombosis (DVT) of left lower extremity, unspecified chronicity, unspecified vein ([facility])/    [facility] AND CLINICS Hematology Oncology Augustus Oliver MD    Office Visit          Future Appointments         Provider Department Appt Notes    In 2 months Milton Cline MD [address], [address] [facility],3Rd Floor, [facility]     In 5 months Aster Schaefer MD [address], 602 [facility] yearly               Passed - Last BP reading less than 140/90     BP Readings from Last 1 Encounters:  05/31/23 : 126/74              Passed - CMP or BMP in past 6 months     Recent Results (from the past 4392 hour(s))   COMP METABOLIC PANEL (14)    Collection Time: 07/10/23 12:24 PM   Result Value Ref Range Glucose 92 70 - 99 mg/dL    Sodium 135 (L) 136 - 145 mmol/L    Potassium 3.9 3.5 - 5.1 mmol/L    Chloride 103 98 - 112 mmol/L    CO2 26.0 21.0 - 32.0 mmol/L    Anion Gap 6 0 - 18 mmol/L    BUN 17 7 - 18 mg/dL    Creatinine 1.14 (H) 0.55 - 1.02 mg/dL    Calcium, Total 9.4 8.5 - 10.1 mg/dL    Calculated Osmolality 281 275 - 295 mOsm/kg    eGFR-Cr 52 (L) >=60 mL/min/1.73m2    AST 22 15 - 37 U/L    ALT 16 13 - 56 U/L    Alkaline Phosphatase 72 55 - 142 U/L    Bilirubin, Total 1.0 0.1 - 2.0 mg/dL    Total Protein 6.7 6.4 - 8.2 g/dL    Albumin 3.5 3.4 - 5.0 g/dL    Globulin  3.2 2.8 - 4.4 g/dL    A/G Ratio 1.1 1.0 - 2.0    Patient Fasting for CMP? No      *Note: Due to a large number of results and/or encounters for the requested time period, some results have not been displayed. A complete set of results can be found in Results Review.                Passed - In person appointment or virtual visit in the past 6 months     Recent Outpatient Visits              1 month ago Closed fracture of left ankle with routine healing, subsequent encounter    Margareth Caballero MD    Office Visit    2 months ago Pre-op exam    Cal Avila MD    Office Visit    3 months ago Primary hypertension    [address] [facility],[address], [address] [facility],3Rd Floor, AvingerBrock MD    Office Visit    6 months ago Pulmonary emphysema, unspecified emphysema type [facility])    Reina Wild MD    Office Visit    7 months ago Deep vein thrombosis (DVT) of left lower extremity, unspecified chronicity, unspecified vein [facility])    [facility] [place] AND [facility] Hematology Oncology Toya Gibbs MD    Office Visit          Future Appointments         Provider Department Appt Notes    In 2 months MD Franko Hemphill     In 5 months Kartik Forbes MD [employer], [address] [facility] yearly               Passed - [facility] or GFRNAA > 50     GFR Evaluation  EGFRCR: 52 , resulted on 7/10/2023               Recent Outpatient Visits              1 month ago Closed fracture of left ankle with routine healing, subsequent encounter    [facility], Ruth Welsh MD    Office Visit    2 months ago Pre-op exam    [facility]    Office Visit    3 months ago Primary hypertension    [address], [address], [place] MD    Office Visit    6 months ago Pulmonary emphysema, unspecified emphysema type [facility])    Jesus Rosales MD    Office Visit    7 months ago Deep vein thrombosis (DVT) of left lower extremity, unspecified chronicity, unspecified vein [facility])    [facility] AND CLINICS Hematology Oncology Tapan Bhatt MD    Office Visit            Future Appointments         Provider Department Appt Notes    In 2 months Mike Moreno MD [address], [address], [address]     In 5 months Marquise Serrano MD [address], [address] [facility], [address] yearly

## 2023-08-21 ENCOUNTER — HOSPITAL ENCOUNTER (EMERGENCY)
Facility: HOSPITAL | Age: 73
Discharge: HOME | End: 2023-08-21
Attending: STUDENT IN AN ORGANIZED HEALTH CARE EDUCATION/TRAINING PROGRAM
Payer: MEDICARE

## 2023-08-21 ENCOUNTER — APPOINTMENT (OUTPATIENT)
Dept: ULTRASOUND IMAGING | Facility: HOSPITAL | Age: 73
End: 2023-08-21
Attending: STUDENT IN AN ORGANIZED HEALTH CARE EDUCATION/TRAINING PROGRAM
Payer: MEDICARE

## 2023-08-21 ENCOUNTER — APPOINTMENT (OUTPATIENT)
Dept: GENERAL RADIOLOGY | Facility: HOSPITAL | Age: 73
End: 2023-08-21
Attending: STUDENT IN AN ORGANIZED HEALTH CARE EDUCATION/TRAINING PROGRAM
Payer: MEDICARE

## 2023-08-21 VITALS
TEMPERATURE: 98 F | WEIGHT: 175 LBS | HEART RATE: 60 BPM | DIASTOLIC BLOOD PRESSURE: 84 MMHG | BODY MASS INDEX: 27 KG/M2 | RESPIRATION RATE: 17 BRPM | SYSTOLIC BLOOD PRESSURE: 139 MMHG | OXYGEN SATURATION: 98 %

## 2023-08-21 DIAGNOSIS — S83.92XA SPRAIN OF LEFT KNEE, UNSPECIFIED LIGAMENT, INITIAL ENCOUNTER: Primary | ICD-10-CM

## 2023-08-21 PROCEDURE — 93971 EXTREMITY STUDY: CPT | Performed by: STUDENT IN AN ORGANIZED HEALTH CARE EDUCATION/TRAINING PROGRAM

## 2023-08-21 PROCEDURE — 99284 EMERGENCY DEPT VISIT MOD MDM: CPT

## 2023-08-21 PROCEDURE — 73560 X-RAY EXAM OF KNEE 1 OR 2: CPT | Performed by: STUDENT IN AN ORGANIZED HEALTH CARE EDUCATION/TRAINING PROGRAM

## 2023-08-21 NOTE — ED INITIAL ASSESSMENT (HPI)
Patient arrives via triage for swollen left knee. Patient states no known injury. No warmth noted but visible swelling to knee. HX of blood clots.

## 2023-08-24 ENCOUNTER — PATIENT OUTREACH (OUTPATIENT)
Dept: CASE MANAGEMENT | Age: 73
End: 2023-08-24

## 2023-08-24 NOTE — PROGRESS NOTES
1st attempt ED f/up apt request   PCP -existing apt (9/20) for annual physical, pt doesn't want to make another apt  Closing encounter

## 2023-09-11 ENCOUNTER — OFFICE VISIT (OUTPATIENT)
Dept: PULMONOLOGY | Facility: CLINIC | Age: 73
End: 2023-09-11

## 2023-09-11 VITALS
SYSTOLIC BLOOD PRESSURE: 173 MMHG | BODY MASS INDEX: 27 KG/M2 | HEART RATE: 67 BPM | DIASTOLIC BLOOD PRESSURE: 90 MMHG | HEIGHT: 67 IN | RESPIRATION RATE: 17 BRPM | OXYGEN SATURATION: 100 %

## 2023-09-11 DIAGNOSIS — J42 CHRONIC BRONCHITIS, UNSPECIFIED CHRONIC BRONCHITIS TYPE (HCC): Primary | ICD-10-CM

## 2023-09-11 PROCEDURE — 99213 OFFICE O/P EST LOW 20 MIN: CPT | Performed by: INTERNAL MEDICINE

## 2023-09-11 PROCEDURE — 1126F AMNT PAIN NOTED NONE PRSNT: CPT | Performed by: INTERNAL MEDICINE

## 2023-09-11 NOTE — PROGRESS NOTES
The patient is a 81-year-old female who I know well from prior evaluation comes in now for follow-up. She notes that in general that she is doing well. She still is smoking half pack per day. Review of Systems:  Vision normal. Ear nose and throat normal. Bowel normal. Bladder function normal. No depression. No thyroid disease. No lymphatic system concerns. No rash. Muscles and joints unremarkable. No weight loss no weight gain. Physical Examination:  Vital signs normal. HEENT examination is unremarkable with pupils equal round and reactive to light and accommodation. Neck without adenopathy, thyromegaly, JVD nor bruit. Lungs clear to auscultation and percussion. Cardiac regular rate and rhythm no murmur. Abdomen nontender, without hepatosplenomegaly and no mass appreciable. Extremities and Musculoskeletal without clubbing cyanosis nor edema, and mobility acceptable. Neurologic grossly intact with symmetric tone and strength and reflex. Assessment and plan:  1. COPD-doing well clinically. Continue current management and again patient is encouraged to quit smoking. Literature provided. Continue with current inhalers. Annual flu shot and COVID booster. 2.  Tiny pulmonary nodules-following with the low-dose CT screening program.    Lung Cancer Counseling and Shared Decision Making Session in an Asymptomatic Smoker/Former Smoker   Spencer White is a 81year old female without current symptoms of lung cancer. Smoking status:   Some Days   0.25 Packs/day   For 50.00 Years      Types:   Cigarettes      Smokeless tobacco:   Never        She received information on the importance of adherence to annual lung cancer Low Dose CT (LDCT) screening, the impact of her comorbidities and her ability or willingness to undergo diagnosis and treatment. she is in agreement and an order will be placed for CT LUNG LD SCREENING(CPT=71271).     We counseled the importance of maintaining cigarette smoking abstinence if she is a former smoker and the importance of smoking cessation if she is a current smoker and we discussed and furnished information about tobacco cessation interventions. 3.  History of DVT-on Xarelto.

## 2023-09-20 ENCOUNTER — OFFICE VISIT (OUTPATIENT)
Dept: INTERNAL MEDICINE CLINIC | Facility: CLINIC | Age: 73
End: 2023-09-20

## 2023-09-20 VITALS
HEIGHT: 67 IN | OXYGEN SATURATION: 98 % | TEMPERATURE: 97 F | BODY MASS INDEX: 27.62 KG/M2 | HEART RATE: 60 BPM | WEIGHT: 176 LBS | DIASTOLIC BLOOD PRESSURE: 82 MMHG | SYSTOLIC BLOOD PRESSURE: 137 MMHG

## 2023-09-20 DIAGNOSIS — D32.9 MENINGIOMA (HCC): ICD-10-CM

## 2023-09-20 DIAGNOSIS — Z00.00 ANNUAL PHYSICAL EXAM: Primary | ICD-10-CM

## 2023-09-20 DIAGNOSIS — Z12.31 ENCOUNTER FOR SCREENING MAMMOGRAM FOR MALIGNANT NEOPLASM OF BREAST: ICD-10-CM

## 2023-09-20 DIAGNOSIS — Z00.00 ENCOUNTER FOR ANNUAL HEALTH EXAMINATION: ICD-10-CM

## 2023-09-20 PROBLEM — F32.4 MAJOR DEPRESSIVE DISORDER WITH SINGLE EPISODE, IN PARTIAL REMISSION (HCC): Status: RESOLVED | Noted: 2020-01-22 | Resolved: 2023-09-20

## 2023-09-20 PROBLEM — F32.4 MAJOR DEPRESSIVE DISORDER WITH SINGLE EPISODE, IN PARTIAL REMISSION: Status: RESOLVED | Noted: 2020-01-22 | Resolved: 2023-09-20

## 2023-09-20 LAB
ALBUMIN SERPL-MCNC: 3.6 G/DL (ref 3.4–5)
ALBUMIN/GLOB SERPL: 1.1 {RATIO} (ref 1–2)
ALP LIVER SERPL-CCNC: 66 U/L
ALT SERPL-CCNC: 19 U/L
ANION GAP SERPL CALC-SCNC: 6 MMOL/L (ref 0–18)
AST SERPL-CCNC: 21 U/L (ref 15–37)
BASOPHILS # BLD AUTO: 0.08 X10(3) UL (ref 0–0.2)
BASOPHILS NFR BLD AUTO: 1.3 %
BILIRUB SERPL-MCNC: 0.9 MG/DL (ref 0.1–2)
BUN BLD-MCNC: 18 MG/DL (ref 7–18)
BUN/CREAT SERPL: 15.3 (ref 10–20)
CALCIUM BLD-MCNC: 9.6 MG/DL (ref 8.5–10.1)
CHLORIDE SERPL-SCNC: 104 MMOL/L (ref 98–112)
CHOLEST SERPL-MCNC: 213 MG/DL (ref ?–200)
CO2 SERPL-SCNC: 28 MMOL/L (ref 21–32)
CREAT BLD-MCNC: 1.18 MG/DL
DEPRECATED RDW RBC AUTO: 48.2 FL (ref 35.1–46.3)
EGFRCR SERPLBLD CKD-EPI 2021: 50 ML/MIN/1.73M2 (ref 60–?)
EOSINOPHIL # BLD AUTO: 0.25 X10(3) UL (ref 0–0.7)
EOSINOPHIL NFR BLD AUTO: 4.2 %
ERYTHROCYTE [DISTWIDTH] IN BLOOD BY AUTOMATED COUNT: 13.3 % (ref 11–15)
FASTING PATIENT LIPID ANSWER: NO
FASTING STATUS PATIENT QL REPORTED: NO
GLOBULIN PLAS-MCNC: 3.4 G/DL (ref 2.8–4.4)
GLUCOSE BLD-MCNC: 85 MG/DL (ref 70–99)
HCT VFR BLD AUTO: 42.6 %
HDLC SERPL-MCNC: 96 MG/DL (ref 40–59)
HGB BLD-MCNC: 14 G/DL
IMM GRANULOCYTES # BLD AUTO: 0.02 X10(3) UL (ref 0–1)
IMM GRANULOCYTES NFR BLD: 0.3 %
LDLC SERPL CALC-MCNC: 104 MG/DL (ref ?–100)
LYMPHOCYTES # BLD AUTO: 1.84 X10(3) UL (ref 1–4)
LYMPHOCYTES NFR BLD AUTO: 30.9 %
MCH RBC QN AUTO: 32.2 PG (ref 26–34)
MCHC RBC AUTO-ENTMCNC: 32.9 G/DL (ref 31–37)
MCV RBC AUTO: 97.9 FL
MONOCYTES # BLD AUTO: 0.6 X10(3) UL (ref 0.1–1)
MONOCYTES NFR BLD AUTO: 10.1 %
NEUTROPHILS # BLD AUTO: 3.17 X10 (3) UL (ref 1.5–7.7)
NEUTROPHILS # BLD AUTO: 3.17 X10(3) UL (ref 1.5–7.7)
NEUTROPHILS NFR BLD AUTO: 53.2 %
NONHDLC SERPL-MCNC: 117 MG/DL (ref ?–130)
OSMOLALITY SERPL CALC.SUM OF ELEC: 287 MOSM/KG (ref 275–295)
PLATELET # BLD AUTO: 219 10(3)UL (ref 150–450)
POTASSIUM SERPL-SCNC: 4.2 MMOL/L (ref 3.5–5.1)
PROT SERPL-MCNC: 7 G/DL (ref 6.4–8.2)
RBC # BLD AUTO: 4.35 X10(6)UL
SODIUM SERPL-SCNC: 138 MMOL/L (ref 136–145)
TRIGL SERPL-MCNC: 76 MG/DL (ref 30–149)
TSI SER-ACNC: 1.69 MIU/ML (ref 0.36–3.74)
VLDLC SERPL CALC-MCNC: 13 MG/DL (ref 0–30)
WBC # BLD AUTO: 6 X10(3) UL (ref 4–11)

## 2023-09-20 PROCEDURE — 1125F AMNT PAIN NOTED PAIN PRSNT: CPT | Performed by: INTERNAL MEDICINE

## 2023-09-20 PROCEDURE — G0008 ADMIN INFLUENZA VIRUS VAC: HCPCS | Performed by: INTERNAL MEDICINE

## 2023-09-20 PROCEDURE — G0439 PPPS, SUBSEQ VISIT: HCPCS | Performed by: INTERNAL MEDICINE

## 2023-09-20 PROCEDURE — 90662 IIV NO PRSV INCREASED AG IM: CPT | Performed by: INTERNAL MEDICINE

## 2023-09-20 PROCEDURE — 36415 COLL VENOUS BLD VENIPUNCTURE: CPT | Performed by: INTERNAL MEDICINE

## 2023-09-20 RX ORDER — MONTELUKAST SODIUM 10 MG/1
10 TABLET ORAL DAILY
Qty: 30 TABLET | Refills: 0 | Status: SHIPPED | OUTPATIENT
Start: 2023-09-20 | End: 2023-09-20

## 2023-09-21 NOTE — PROGRESS NOTES
Patient informed that her total cholesterol is high but also hdl is high , balancing, kidney fucntion little off, drink more fluids , avoid ibuprophen , aleeve , hb is ok, thyroid is ok

## 2023-10-11 ENCOUNTER — MED REC SCAN ONLY (OUTPATIENT)
Dept: INTERNAL MEDICINE CLINIC | Facility: CLINIC | Age: 73
End: 2023-10-11

## 2023-10-16 ENCOUNTER — TELEPHONE (OUTPATIENT)
Facility: CLINIC | Age: 73
End: 2023-10-16

## 2023-10-16 NOTE — TELEPHONE ENCOUNTER
Madison from [employer] calling to request order for Bone Density scan placed on 05/31/23 and a new one placed, as old patient is trying to schedule Bone density scan, but do to older in system message is popping up state bone density scan will not be covered by insurance.

## 2023-10-21 ENCOUNTER — HOSPITAL ENCOUNTER (OUTPATIENT)
Dept: MAMMOGRAPHY | Age: 73
Discharge: HOME | End: 2023-10-21
Attending: INTERNAL MEDICINE

## 2023-10-21 DIAGNOSIS — Z12.31 ENCOUNTER FOR SCREENING MAMMOGRAM FOR MALIGNANT NEOPLASM OF BREAST: ICD-10-CM

## 2023-10-21 PROCEDURE — 77067 SCR MAMMO BI INCL CAD: CPT | Performed by: INTERNAL MEDICINE

## 2023-10-21 PROCEDURE — 77063 BREAST TOMOSYNTHESIS BI: CPT | Performed by: INTERNAL MEDICINE

## 2023-10-31 ENCOUNTER — HOSPITAL ENCOUNTER (OUTPATIENT)
Dept: ULTRASOUND IMAGING | Age: 73
Discharge: HOME | End: 2023-10-31
Attending: INTERNAL MEDICINE
Payer: MEDICARE

## 2023-10-31 DIAGNOSIS — R79.89 ELEVATED LFTS: ICD-10-CM

## 2023-10-31 PROCEDURE — 76705 ECHO EXAM OF ABDOMEN: CPT | Performed by: INTERNAL MEDICINE

## 2023-12-02 ENCOUNTER — APPOINTMENT (OUTPATIENT)
Dept: GENERAL RADIOLOGY | Age: 73
End: 2023-12-02
Attending: NURSE PRACTITIONER
Payer: MEDICARE

## 2023-12-02 ENCOUNTER — APPOINTMENT (OUTPATIENT)
Dept: ULTRASOUND IMAGING | Age: 73
End: 2023-12-02
Attending: NURSE PRACTITIONER
Payer: MEDICARE

## 2023-12-02 ENCOUNTER — HOSPITAL ENCOUNTER (OUTPATIENT)
Age: 73
Discharge: HOME | End: 2023-12-02
Payer: MEDICARE

## 2023-12-02 VITALS
TEMPERATURE: 98 F | RESPIRATION RATE: 18 BRPM | HEART RATE: 84 BPM | OXYGEN SATURATION: 100 % | SYSTOLIC BLOOD PRESSURE: 139 MMHG | DIASTOLIC BLOOD PRESSURE: 63 MMHG

## 2023-12-02 DIAGNOSIS — M79.89 LEFT LEG SWELLING: Primary | ICD-10-CM

## 2023-12-02 DIAGNOSIS — M25.572 ACUTE LEFT ANKLE PAIN: ICD-10-CM

## 2023-12-02 DIAGNOSIS — L03.116 CELLULITIS OF LEFT ANTERIOR LOWER LEG: ICD-10-CM

## 2023-12-02 PROCEDURE — 73610 X-RAY EXAM OF ANKLE: CPT | Performed by: NURSE PRACTITIONER

## 2023-12-02 PROCEDURE — 99213 OFFICE O/P EST LOW 20 MIN: CPT | Performed by: NURSE PRACTITIONER

## 2023-12-02 PROCEDURE — 93971 EXTREMITY STUDY: CPT | Performed by: NURSE PRACTITIONER

## 2023-12-02 RX ORDER — CEFADROXIL 500 MG/1
500 CAPSULE ORAL 2 TIMES DAILY
Qty: 20 CAPSULE | Refills: 0 | Status: SHIPPED | OUTPATIENT
Start: 2023-12-02 | End: 2023-12-12

## 2023-12-02 RX ORDER — SACCHAROMYCES BOULARDII 250 MG
250 CAPSULE ORAL 2 TIMES DAILY
Qty: 20 CAPSULE | Refills: 0 | Status: SHIPPED | OUTPATIENT
Start: 2023-12-02 | End: 2023-12-12

## 2023-12-02 NOTE — ED INITIAL ASSESSMENT (HPI)
Pt with history of dvt, on xarelto. Pt stopped xarelto without consulting her doctor. On Thursday, patient got RSV and shingles vaccine on Thursday. Pt c/o left ankle pain and bottom of heel, which started Thursday. Foot surgery on same foot in April.  Denies injury

## 2023-12-02 NOTE — DISCHARGE INSTRUCTIONS
Start the antibiotic as prescribed and finish it completely. Take it with a probiotic that was prescribed as antibiotic can cause upset stomach and diarrhea. Call your primary care doctor Monday to set up follow-up appointment for skin check for cellulitis and call Dr. Nicholas Shah on Monday to discuss treatment options with a blood thinner that is affordable as she should be taking the blood thinner as she has a history of DVT. If you develop any worsening symptoms worsening redness or redness spreading further up the leg fevers or chills increase in swelling worsening pain go to the nearest emergency department.

## 2023-12-29 ENCOUNTER — HOSPITAL ENCOUNTER (OUTPATIENT)
Dept: CT IMAGING | Facility: HOSPITAL | Age: 73
Discharge: HOME | End: 2023-12-29
Attending: INTERNAL MEDICINE
Payer: MEDICARE

## 2023-12-29 DIAGNOSIS — Z87.891 PERSONAL HISTORY OF TOBACCO USE, PRESENTING HAZARDS TO HEALTH: ICD-10-CM

## 2023-12-29 PROCEDURE — 71271 CT THORAX LUNG CANCER SCR C-: CPT | Performed by: INTERNAL MEDICINE

## 2024-01-27 ENCOUNTER — HOSPITAL ENCOUNTER (OUTPATIENT)
Dept: BONE DENSITY | Age: 74
Discharge: HOME | End: 2024-01-27
Attending: INTERNAL MEDICINE
Payer: MEDICARE

## 2024-01-27 DIAGNOSIS — Z78.0 MENOPAUSE: ICD-10-CM

## 2024-01-27 PROCEDURE — 77080 DXA BONE DENSITY AXIAL: CPT | Performed by: INTERNAL MEDICINE

## 2024-03-20 ENCOUNTER — OFFICE VISIT (OUTPATIENT)
Dept: INTERNAL MEDICINE CLINIC | Facility: CLINIC | Age: 74
End: 2024-03-20
Payer: MEDICARE

## 2024-03-20 VITALS
OXYGEN SATURATION: 100 % | TEMPERATURE: 98 F | BODY MASS INDEX: 27.95 KG/M2 | HEART RATE: 58 BPM | WEIGHT: 176 LBS | DIASTOLIC BLOOD PRESSURE: 84 MMHG | HEIGHT: 66.5 IN | SYSTOLIC BLOOD PRESSURE: 134 MMHG

## 2024-03-20 DIAGNOSIS — I10 PRIMARY HYPERTENSION: Primary | ICD-10-CM

## 2024-03-20 PROCEDURE — 99214 OFFICE O/P EST MOD 30 MIN: CPT | Performed by: INTERNAL MEDICINE

## 2024-03-20 NOTE — PROGRESS NOTES
Subjective:     Patient ID: Millie Elder is a 73 year old female.    HPI    History/Other: She came in today for follow-up on her blood pressure.  Also states that she stopped taking because it is expensive and she cannot afford.  She is taking aspirin.  She did not follow-up with oncology  Review of Systems   Constitutional: Negative.    HENT: Negative.     Eyes: Negative.    Respiratory: Negative.     Cardiovascular: Negative.    Gastrointestinal: Negative.    Genitourinary: Negative.    Musculoskeletal: Negative.    Neurological: Negative.    Hematological: Negative.    Psychiatric/Behavioral: Negative.       Current Outpatient Medications   Medication Sig Dispense Refill    Acetaminophen 500 MG Oral Cap 2 tablet EVERY 4 HOURS (route: oral)      amLODIPine 5 MG Oral Tab Take 1 tablet (5 mg total) by mouth daily. 90 tablet 3    carvedilol 3.125 MG Oral Tab Take 1 tablet (3.125 mg total) by mouth 2 (two) times daily with meals. 180 tablet 3    albuterol (PROVENTIL HFA) 108 (90 Base) MCG/ACT Inhalation Aero Soln Inhale 2 puffs into the lungs every 6 (six) hours as needed for Wheezing. 2 each 0    Calcium-Phosphorus-Vitamin D (CITRACAL +D3 OR) Take by mouth daily.      albuterol sulfate (2.5 MG/3ML) 0.083% Inhalation Nebu Soln Take 3 mL (2.5 mg total) by nebulization every 6 (six) hours as needed for Wheezing or Shortness of Breath. 30 each 0    fluticasone-Umeclidin-Vilant (TRELEGY ELLIPTA) 100-62.5-25 MCG/INH Inhalation Aerosol Powder, Breath Activated Inhale 1 puff into the lungs daily.      Multiple Vitamin (MULTIVITAMIN OR) Take by mouth.      Flaxseed, Linseed, (FLAX SEED OIL OR) Take by mouth.      Coenzyme Q10 (CO Q 10 OR) Take by mouth.      Stress Formula/Zinc Oral Tab Take 1 tablet by mouth daily.      calcitonin, salmon, 200 UNIT/ACT Nasal Solution use 2 sprays daily intranasally (Patient not taking: Reported on 9/11/2023)      rivaroxaban (XARELTO) 10 MG Oral Tab Take 1 tablet (10 mg total) by mouth  daily with food. (Patient not taking: Reported on 12/2/2023) 90 tablet 3     Allergies:  Allergies   Allergen Reactions    Meloxicam RENAL INSUFFICIENCY     \"affected my kidneys\"      Chantix [Varenicline] NAUSEA ONLY     Other reaction(s): mood swings    Strawberries RASH     Hives         Past Medical History:   Diagnosis Date    Acute deep vein thrombosis (DVT) of proximal vein of left lower extremity ([facility]) 08/25/2021    Anxiety     Cataract     Colon adenomas 12/01/2021    at least 3    COPD (chronic obstructive pulmonary disease) ([facility])     Depression     Disorder of thyroid     High blood pressure     Major depression 10/17/2016    Major depressive disorder with single episode, in partial remission ([facility]) 01/22/2020    Meningioma ([facility])     July 2017 started with dizzy spell and visual disturbance.     Osteoarthritis       Past Surgical History:   Procedure Laterality Date    CATARACT      Bilateral    COLONOSCOPY  12/01/2021    INCISION AND DRAINAGE Right 06/11/2019    sebaceous cyst right arm    OTHER Bilateral     vein surgery     OTHER Left     L arm fracture repair    THYROID LOBECTOMY,UNILAT  02/03/2020      Family History   Problem Relation Age of Onset    Diabetes Father     Cancer Mother 68        renal cancer    Hypertension Mother     Diabetes Paternal Grandmother     Cancer Sister         cervical cancer    Prostate Cancer Brother 56    Prostate Cancer Brother 58    Colon Cancer Cousin       Social History:   Social History     Socioeconomic History    Marital status: Single   Tobacco Use    Smoking status: Some Days     Packs/day: 0.25     Years: 50.00     Additional pack years: 0.00     Total pack years: 12.50     Types: Cigarettes    Smokeless tobacco: Never   Vaping Use    Vaping Use: Never used   Substance and Sexual Activity    Alcohol use: Yes     Alcohol/week: 1.0 standard drink of alcohol     Types: 1 Standard drinks or equivalent per week     Comment: occ    Drug use: No   Other Topics  Concern    Caffeine Concern Yes     Comment: coffee    Pt has a pacemaker No    Pt has a defibrillator No    Reaction to local anesthetic No        Objective:   Physical Exam  Vitals and nursing note reviewed.   Constitutional:       Appearance: Normal appearance.   HENT:      Head: Normocephalic and atraumatic.   Cardiovascular:      Rate and Rhythm: Normal rate and regular rhythm.      Pulses: Normal pulses.      Heart sounds: Normal heart sounds.   Pulmonary:      Effort: Pulmonary effort is normal.      Breath sounds: Normal breath sounds.   Abdominal:      General: Bowel sounds are normal.      Palpations: Abdomen is soft.   Musculoskeletal:         General: Normal range of motion.      Cervical back: Normal range of motion and neck supple.   Skin:     General: Skin is warm.   Neurological:      Mental Status: She is alert. Mental status is at baseline.   Psychiatric:         Mood and Affect: Mood normal.         Assessment & Plan:   No diagnosis found.  Hypertension controlled continue with current medications  Unprovoked DVT-with family history of DVT/per oncologist was supposed to be on prophylactic dose of Xarelto, she stopped taking I did advise her to follow-up with oncology  No orders of the defined types were placed in this encounter.      Meds This Visit:  Requested Prescriptions      No prescriptions requested or ordered in this encounter       Imaging & Referrals:  None

## 2024-04-03 RX ORDER — CARVEDILOL 3.12 MG/1
3.12 TABLET ORAL 2 TIMES DAILY WITH MEALS
Qty: 180 TABLET | Refills: 3 | Status: SHIPPED | OUTPATIENT
Start: 2024-04-03

## 2024-04-03 NOTE — TELEPHONE ENCOUNTER
Please review; protocol failed/no protocol.     Requested Prescriptions   Pending Prescriptions Disp Refills    CARVEDILOL 3.125 MG Oral Tab [Pharmacy Med Name: Carvedilol 3.125 Mg Tab Zydu] 180 tablet 0     Sig: Take 1 tablet (3.125 mg total) by mouth 2 (two) times daily with meals.       Hypertension Medications Protocol Failed - 3/31/2024  9:23 AM        Failed - EGFRCR or GFRNAA > 50     GFR Evaluation  EGFRCR: 50 , resulted on 9/20/2023          Passed - CMP or BMP in past 12 months        Passed - Last BP reading less than 140/90     BP Readings from Last 1 Encounters:   03/20/24 134/84               Passed - In person appointment or virtual visit in the past 12 mos or appointment in next 3 mos     Recent Outpatient Visits              [facility]    [facility]    6 months ago [facility]    6 months ago Chronic bronchitis, unspecified chronic bronch[facility] type [facility]    10 months ago Closed fracture of left ankle with routine healing, subsequent [facility]    [facility]    11 months ago [facility]          Future Appointments         Provider Department Appt Notes    In 5 months Briseyda Jain MD [facility]                      Recent Outpatient Visits              1 week ago [facility]    [facility]    6 months ago [facility] physical [facility]    [facility]    6  months ago Chronic bronchitis, unspecified chronic bronchitis type (HCC)    [facility] [place] Leonardo Schumacher MD    Office Visit    10 months ago Closed fracture of left ankle with routine healing, subsequent encounter    [facility] [place] Briseyda Jain MD    Office Visit    11 months ago Pre-op exam    [facility] Efe Jain MD    Office Visit             Future Appointments         Provider Department Appt Notes    In 5 months Briseyda Jain MD [facility]

## 2024-07-02 RX ORDER — CARVEDILOL 3.12 MG/1
3.12 TABLET ORAL 2 TIMES DAILY WITH MEALS
Qty: 180 TABLET | Refills: 0 | OUTPATIENT
Start: 2024-07-02

## 2024-07-12 ENCOUNTER — TELEPHONE (OUTPATIENT)
Dept: INTERNAL MEDICINE CLINIC | Facility: CLINIC | Age: 74
End: 2024-07-12

## 2024-07-12 RX ORDER — CARVEDILOL 3.12 MG/1
3.12 TABLET ORAL 2 TIMES DAILY WITH MEALS
Qty: 180 TABLET | Refills: 0 | OUTPATIENT
Start: 2024-07-12

## 2024-07-12 RX ORDER — CARVEDILOL 3.12 MG/1
3.12 TABLET ORAL 2 TIMES DAILY WITH MEALS
Qty: 180 TABLET | Refills: 3 | Status: SHIPPED | OUTPATIENT
Start: 2024-07-12

## 2024-07-12 NOTE — TELEPHONE ENCOUNTER
Received call form Dionna with [facility] [place], [place] . Patient's date of birth and full name both confirmed.   Carvedilol denied - due to 4/3/24 Prescription sent - but per Dionna, she has searched thoroughly in their system and cannot locate that 4/3/24 Prescription  RN advised that our record shows, Copied from  4/3/24 rx:     \"Sent to pharmacy as: Carvedilol 3.125 MG Oral Tablet (Coreg)    E-Prescribing Status: Receipt confirmed by pharmacy (4/3/2024  9:04 AM CDT)\"      RN re-sent it today. Dionna stayed on the call. Dionna and this RN confirmed [facility] received the Prescription today.   No further action

## 2024-07-12 NOTE — TELEPHONE ENCOUNTER
Related to 7/9/24 Refill Telephone Encounter Carvedilol    Received call form Dionna with [facility] [place], [place] . Patient's date of birth and full name both confirmed.   Carvedilol denied - due to 4/3/24 Prescription sent - but per Dionna, she has searched thoroughly in their system and cannot locate that 4/3/24 Prescription  RN advised that our record shows, Copied from  4/3/24 rx:      \"Sent to pharmacy as: Carvedilol 3.125 MG Oral Tablet (Coreg)     E-Prescribing Status: Receipt confirmed by pharmacy (4/3/2024  9:04 AM CDT)\"        RN re-sent it today. Dionna stayed on the call. Dionna and this RN confirmed [facility] received the Prescription today.   No further action

## 2024-09-25 ENCOUNTER — OFFICE VISIT (OUTPATIENT)
Dept: INTERNAL MEDICINE CLINIC | Facility: CLINIC | Age: 74
End: 2024-09-25

## 2024-09-25 VITALS
HEIGHT: 66.5 IN | TEMPERATURE: 98 F | WEIGHT: 167 LBS | SYSTOLIC BLOOD PRESSURE: 134 MMHG | HEART RATE: 64 BPM | BODY MASS INDEX: 26.52 KG/M2 | OXYGEN SATURATION: 99 % | DIASTOLIC BLOOD PRESSURE: 78 MMHG

## 2024-09-25 DIAGNOSIS — D12.6 ADENOMATOUS POLYP OF COLON, UNSPECIFIED PART OF COLON: ICD-10-CM

## 2024-09-25 DIAGNOSIS — Z12.31 ENCOUNTER FOR SCREENING MAMMOGRAM FOR MALIGNANT NEOPLASM OF BREAST: ICD-10-CM

## 2024-09-25 DIAGNOSIS — N18.30 STAGE 3 CHRONIC KIDNEY DISEASE, UNSPECIFIED WHETHER STAGE 3A OR 3B CKD (HCC): ICD-10-CM

## 2024-09-25 DIAGNOSIS — Z00.00 ENCOUNTER FOR ANNUAL HEALTH EXAMINATION: ICD-10-CM

## 2024-09-25 DIAGNOSIS — Z00.00 ANNUAL PHYSICAL EXAM: ICD-10-CM

## 2024-09-25 DIAGNOSIS — E53.8 B12 DEFICIENCY: ICD-10-CM

## 2024-09-25 DIAGNOSIS — Z00.00 MEDICARE ANNUAL WELLNESS VISIT, SUBSEQUENT: Primary | ICD-10-CM

## 2024-09-25 PROBLEM — F41.9 ANXIETY: Status: ACTIVE | Noted: 2024-09-25

## 2024-09-25 PROBLEM — I87.2 VENOUS INSUFFICIENCY (CHRONIC) (PERIPHERAL): Status: ACTIVE | Noted: 2023-04-21

## 2024-09-25 PROBLEM — I48.0 PAROXYSMAL ATRIAL FIBRILLATION (HCC): Status: ACTIVE | Noted: 2023-04-21

## 2024-09-25 PROBLEM — R26.81 UNSTEADINESS ON FEET: Status: ACTIVE | Noted: 2023-04-21

## 2024-09-25 PROBLEM — J44.9 CHRONIC OBSTRUCTIVE PULMONARY DISEASE, UNSPECIFIED (HCC): Status: ACTIVE | Noted: 2023-04-21

## 2024-09-25 PROBLEM — S82.842D: Status: ACTIVE | Noted: 2023-04-21

## 2024-09-25 PROBLEM — C71.1 MALIGNANT NEOPLASM OF FRONTAL LOBE (HCC): Status: ACTIVE | Noted: 2023-04-21

## 2024-09-25 PROBLEM — R29.898 OTHER SYMPTOMS AND SIGNS INVOLVING THE MUSCULOSKELETAL SYSTEM: Status: ACTIVE | Noted: 2023-04-21

## 2024-09-25 PROBLEM — S92.351D: Status: ACTIVE | Noted: 2023-04-21

## 2024-09-25 PROBLEM — R26.89 OTHER ABNORMALITIES OF GAIT AND MOBILITY: Status: ACTIVE | Noted: 2023-04-21

## 2024-09-25 PROBLEM — Z28.311 PARTIALLY VACCINATED FOR COVID-19: Status: ACTIVE | Noted: 2023-04-21
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
## 2024-09-25 LAB
ALBUMIN SERPL-MCNC: 4.3 G/DL (ref 3.2–4.8)
ALBUMIN/GLOB SERPL: 1.5 {RATIO} (ref 1–2)
ALP LIVER SERPL-CCNC: 58 U/L
ALT SERPL-CCNC: 14 U/L
ANION GAP SERPL CALC-SCNC: 5 MMOL/L (ref 0–18)
AST SERPL-CCNC: 29 U/L (ref ?–34)
BASOPHILS # BLD AUTO: 0.07 X10(3) UL (ref 0–0.2)
BASOPHILS NFR BLD AUTO: 1.1 %
BILIRUB SERPL-MCNC: 0.7 MG/DL (ref 0.2–1.1)
BUN BLD-MCNC: 17 MG/DL (ref 9–23)
BUN/CREAT SERPL: 12.7 (ref 10–20)
CALCIUM BLD-MCNC: 10.3 MG/DL (ref 8.7–10.4)
CHLORIDE SERPL-SCNC: 106 MMOL/L (ref 98–112)
CHOLEST SERPL-MCNC: 227 MG/DL (ref ?–200)
CO2 SERPL-SCNC: 29 MMOL/L (ref 21–32)
CREAT BLD-MCNC: 1.34 MG/DL
DEPRECATED RDW RBC AUTO: 49.6 FL (ref 35.1–46.3)
EGFRCR SERPLBLD CKD-EPI 2021: 42 ML/MIN/1.73M2 (ref 60–?)
EOSINOPHIL # BLD AUTO: 0.09 X10(3) UL (ref 0–0.7)
EOSINOPHIL NFR BLD AUTO: 1.4 %
ERYTHROCYTE [DISTWIDTH] IN BLOOD BY AUTOMATED COUNT: 13.7 % (ref 11–15)
EST. AVERAGE GLUCOSE BLD GHB EST-MCNC: 103 MG/DL (ref 68–126)
GLOBULIN PLAS-MCNC: 2.8 G/DL (ref 2–3.5)
GLUCOSE BLD-MCNC: 78 MG/DL (ref 70–99)
HBA1C MFR BLD: 5.2 % (ref ?–5.7)
HCT VFR BLD AUTO: 39.4 %
HDLC SERPL-MCNC: 93 MG/DL (ref 40–59)
HGB BLD-MCNC: 13 G/DL
IMM GRANULOCYTES # BLD AUTO: 0.02 X10(3) UL (ref 0–1)
IMM GRANULOCYTES NFR BLD: 0.3 %
LDLC SERPL CALC-MCNC: 118 MG/DL (ref ?–100)
LYMPHOCYTES # BLD AUTO: 1.87 X10(3) UL (ref 1–4)
LYMPHOCYTES NFR BLD AUTO: 28.8 %
MCH RBC QN AUTO: 32.5 PG (ref 26–34)
MCHC RBC AUTO-ENTMCNC: 33 G/DL (ref 31–37)
MCV RBC AUTO: 98.5 FL
MONOCYTES # BLD AUTO: 0.54 X10(3) UL (ref 0.1–1)
MONOCYTES NFR BLD AUTO: 8.3 %
NEUTROPHILS # BLD AUTO: 3.9 X10 (3) UL (ref 1.5–7.7)
NEUTROPHILS # BLD AUTO: 3.9 X10(3) UL (ref 1.5–7.7)
NEUTROPHILS NFR BLD AUTO: 60.1 %
NONHDLC SERPL-MCNC: 134 MG/DL (ref ?–130)
OSMOLALITY SERPL CALC.SUM OF ELEC: 290 MOSM/KG (ref 275–295)
PLATELET # BLD AUTO: 209 10(3)UL (ref 150–450)
POTASSIUM SERPL-SCNC: 4.1 MMOL/L (ref 3.5–5.1)
PROT SERPL-MCNC: 7.1 G/DL (ref 5.7–8.2)
RBC # BLD AUTO: 4 X10(6)UL
SODIUM SERPL-SCNC: 140 MMOL/L (ref 136–145)
TRIGL SERPL-MCNC: 95 MG/DL (ref 30–149)
TSI SER-ACNC: 2.17 MIU/ML (ref 0.55–4.78)
VIT B12 SERPL-MCNC: 587 PG/ML (ref 211–911)
VLDLC SERPL CALC-MCNC: 16 MG/DL (ref 0–30)
WBC # BLD AUTO: 6.5 X10(3) UL (ref 4–11)

## 2024-09-25 PROCEDURE — 82607 VITAMIN B-12: CPT | Performed by: INTERNAL MEDICINE

## 2024-09-25 PROCEDURE — 85025 COMPLETE CBC W/AUTO DIFF WBC: CPT | Performed by: INTERNAL MEDICINE

## 2024-09-25 PROCEDURE — 80053 COMPREHEN METABOLIC PANEL: CPT | Performed by: INTERNAL MEDICINE

## 2024-09-25 PROCEDURE — 84443 ASSAY THYROID STIM HORMONE: CPT | Performed by: INTERNAL MEDICINE

## 2024-09-25 PROCEDURE — 80061 LIPID PANEL: CPT | Performed by: INTERNAL MEDICINE

## 2024-09-25 PROCEDURE — 83036 HEMOGLOBIN GLYCOSYLATED A1C: CPT | Performed by: INTERNAL MEDICINE

## 2024-09-25 RX ORDER — ASPIRIN 81 MG/1
162 TABLET ORAL DAILY
COMMUNITY

## 2024-09-25 NOTE — PROGRESS NOTES
Subjective:   Millie Elder is a 73 year old female who presents for a Subsequent Annual Wellness visit (Pt already had Initial Annual Wellness) and scheduled follow up of multiple significant but stable problems.       History/Other:   Fall Risk Assessment:   She has been screened for Falls and is High Risk. Fall Prevention information provided to patient in After Visit Summary.    Do you feel unsteady when standing or walking?: No  Do you worry about falling?: Yes  Have you fallen in the past year?: No     Cognitive Assessment:   She had a completely normal cognitive assessment - see flowsheet entries       Functional Ability/Status:   Millie Elder has some abnormal functions as listed below:  She has difficulties Affording Meds based on screening of functional status.       Depression Screening (PHQ):  PHQ-2 SCORE: 0  , done 9/21/2024        5 minutes spent screening and counseling for depression    Advanced Directives:   She does NOT have a Living Will. [Do you have a living will?: No]  She does NOT have a Power of  for Health Care. [Do you have a healthcare power of ?: No]  Discussed Advance Care Planning with patient (and family/surrogate if present). Standard forms made available to patient in After Visit Summary.      Patient Active Problem List   Diagnosis    Disorder of kidney and ureter    Hypertension, benign    Chronic airway obstruction (HCC)    Hyperlipidemia    Tobacco use disorder    Actinic keratosis    Meningioma (HCC)    CKD (chronic kidney disease) stage 3, GFR 30-59 ml/min (HCC)    Acute deep vein thrombosis (DVT) of proximal vein of left lower extremity (HCC)     Allergies:  She is allergic to meloxicam, chantix [varenicline], and strawberries.    Current Medications:  Outpatient Medications Marked as Taking for the 9/25/24 encounter (Office Visit) with Briseyda Jain MD   Medication Sig    carvedilol 3.125 MG Oral Tab Take 1 tablet (3.125 mg total) by mouth 2 (two)  times daily with meals.    Acetaminophen 500 MG Oral Cap 2 tablet EVERY 4 HOURS (route: oral)    amLODIPine 5 MG Oral Tab Take 1 tablet (5 mg total) by mouth daily.    albuterol (PROVENTIL HFA) 108 (90 Base) MCG/ACT Inhalation Aero Soln Inhale 2 puffs into the lungs every 6 (six) hours as needed for Wheezing.    Calcium-Phosphorus-Vitamin D (CITRACAL +D3 OR) Take by mouth daily.    albuterol sulfate (2.5 MG/3ML) 0.083% Inhalation Nebu Soln Take 3 mL (2.5 mg total) by nebulization every 6 (six) hours as needed for Wheezing or Shortness of Breath.    fluticasone-Umeclidin-Vilant (TRELEGY ELLIPTA) 100-62.5-25 MCG/INH Inhalation Aerosol Powder, Breath Activated Inhale 1 puff into the lungs daily.    Multiple Vitamin (MULTIVITAMIN OR) Take by mouth.    Flaxseed, Linseed, (FLAX SEED OIL OR) Take by mouth.    Coenzyme Q10 (CO Q 10 OR) Take by mouth.    Stress Formula/Zinc Oral Tab Take 1 tablet by mouth daily.       Medical History:  She  has a past medical history of Acute deep vein thrombosis (DVT) of proximal vein of left lower extremity ([facility]) (08/25/2021), Allergic rhinitis, Anxiety, Arthritis, Cataract, Colon adenomas (12/01/2021), COPD (chronic obstructive pulmonary disease) ([facility]), Depression, Disorder of thyroid, Essential hypertension, High blood pressure, Major depression (10/17/2016), Major depressive disorder with single episode, in partial remission ([facility]) (01/22/2020), Meningioma ([facility]), and Osteoarthritis.  Surgical History:  She  has a past surgical history that includes cataract; Incision and Drainage (Right, 06/11/2019); other (Bilateral); thyroid lobectomy,unilat (02/03/2020); colonoscopy (12/01/2021); other (Left); and colonoscopy (12-).   Family History:  Her family history includes Cancer in her sister; Cancer (age of onset: 68) in her mother; Colon Cancer in her cousin; Diabetes in her father and paternal grandmother; Hypertension in her mother; Prostate Cancer (age of onset: 56) in her brother;  Prostate Cancer (age of onset: 58) in her brother.  Social History:  She  reports that she has been smoking cigarettes. She has a 25 pack-year smoking history. She has never used smokeless tobacco. She reports current alcohol use of about 11.0 standard drinks of alcohol per week. She reports that she does not use drugs.    Tobacco:  Social History     Tobacco Use   Smoking Status Some Days    Current packs/day: 0.25    Average packs/day: 0.3 packs/day for 75.0 years (25.0 ttl pk-yrs)    Types: Cigarettes   Smokeless Tobacco Never   Tobacco Comments    Smokes 4 cigarettes daily     E-Cigarettes/Vaping       Questions Responses    E-Cigarette Use Never User           Tobacco cessation counseling for 3-10 minutes (add E/M code #42133).      CAGE Alcohol Screen:   She has been screened for alcohol abuse and her score is not 0:  Cut: Have you ever felt you should Cut down on your drinking?: Yes  Annoyed: Have people Annoyed you by criticizing your drinking?: No  Guilty: Have you ever felt bad or Guilty about your drinking?: No  Eye Opener: Have you ever had a drink first thing in the morning to steady your nerves or to get rid of a hangover (Eye opener)?: No  Total Score: 1      Patient Care Team:  Briseyda Jain MD as PCP - General (Internal Medicine)  Luis Alberto Prince MD (Radiation Oncology)  Russell Bryant MD (NEUROSURGERY)  Romero, Griselle, Juanis Carson RN    Review of Systems  GENERAL: feels well otherwise  SKIN: denies any unusual skin lesions  EYES: denies blurred vision or double vision  HEENT: denies nasal congestion, sinus pain or ST  LUNGS: denies shortness of breath with exertion  CARDIOVASCULAR: denies chest pain on exertion  GI: denies abdominal pain, denies heartburn  : denies dysuria, vaginal discharge or itching, no complaint of urinary incontinence   MUSCULOSKELETAL: denies back pain  NEURO: denies headaches  PSYCHE: denies depression or anxiety  HEMATOLOGIC: denies hx of anemia  ENDOCRINE:  denies thyroid history  ALL/ASTHMA: denies hx of allergy or asthma    Objective:   Physical Exam  General Appearance:  Alert, cooperative, no distress, appears stated age   Head:  Normocephalic, without obvious abnormality, atraumatic   Eyes:  PERRL, conjunctiva/corneas clear, EOM's intact both eyes   Ears:  Normal TM's and external ear canals, both ears   Nose: Nares normal, septum midline,mucosa normal, no drainage or sinus tenderness   Throat: Lips, mucosa, and tongue normal; teeth and gums normal   Neck: Supple, symmetrical, trachea midline, no adenopathy;  thyroid: not enlarged, symmetric, no tenderness/mass/nodules; no carotid bruit or JVD   Back:   Symmetric, no curvature, ROM normal, no CVA tenderness   Lungs:   Clear to auscultation bilaterally, respirations unlabored   Heart:  Regular rate and rhythm, S1 and S2 normal, no murmur, rub, or gallop   Abdomen:   Soft, non-tender, bowel sounds active all four quadrants,  no masses, no organomegaly   Pelvic: Deferred   Extremities: Extremities normal, atraumatic, no cyanosis or edema   Pulses: 2+ and symmetric   Skin: Skin color, texture, turgor normal, no rashes or lesions   Lymph nodes: Cervical, supraclavicular, and axillary nodes normal   Neurologic: Normal       Ht 5' 6.5\" (1.689 m)   Wt 167 lb (75.8 kg)   LMP 11/03/2005   BMI 26.55 kg/m²  Estimated body mass index is 26.55 kg/m² as calculated from the following:    Height as of this encounter: 5' 6.5\" (1.689 m).    Weight as of this encounter: 167 lb (75.8 kg).    Medicare Hearing Assessment:   Hearing Screening    Screening Method: Questionnaire  I have a problem hearing over the telephone: No I have trouble following the conversations when two or more people are talking at the same time: No   I have trouble understanding things on the TV: No I have to strain to understand conversations: No   I have to worry about missing the telephone ring or doorbell: No I have trouble hearing conversations in a noisy  background such as a crowded room or restaurant: No   I get confused about where sounds come from: No I misunderstand some words in a sentence and need to ask people to repeat themselves: No   I especially have trouble understanding the speech of women and children: No I have trouble understanding the speaker in a large room such as at a meeting or place of [employer]: No   Many people I talk to seem to mumble (or don't speak clearly): No People get annoyed because I misunderstand what they say: No   I misunderstand what others are saying and make inappropriate responses: No I avoid social activities because I cannot hear well and fear I will reply improperly: No   Family members and friends have told me they think I may have hearing loss: No             Visual Acuity:     Right Eye Chart Acuity: 20/30     Left Eye Chart Acuity: 20/30     Both Eyes Chart Acuity: 20/30            Assessment & Plan:   Millie Elder is a 73 year old female who presents for a Medicare Assessment.     Hypertension, benign stable , continue with current medication        Chronic airway obstruction (HCC) stable ,        Hyperlipidemia-stable will check lipid        Tobacco use disorder-  Still smoking , advised her to quit ,         Meningioma (HCC)stable ,  Actinic keratosis - follow up with dermatology   depression stable      CKD (chronic kidney diseaese) stage 3, GFR 30-59 ml/min (HCC) stable will check BMP  Lower extremity DVT unprovoked continue with Xarelto  Due to family h/o blood disorder  she is following with hematology     Thyroid nodule s/p surgery  stable       The patient indicates understanding of these issues and agrees to the plan.  Reinforced healthy diet, lifestyle, and exercise.      No follow-ups on file.     TANG TRINH MD, 9/25/2024     Supplementary Documentation:   General Health:  In the past six months, have you lost more than 10 pounds without trying?: 2 - No  Has your appetite been poor?: No  Type of Diet:  Balanced;Low Salt  How would you describe your daily physical activity?: Light  How would you describe your current health state?: Good  How do you maintain positive mental well-being?: Social Interaction;Games;Visiting Family  On a scale of 0 to 10, with 0 being no pain and 10 being severe pain, what is your pain level?: 2 - (Mild)  In the past six months, have you experienced urine leakage?: 0-No  At any time do you feel concerned for the safety/well-being of yourself and/or your children, in your home or elsewhere?: No  Have you had any immunizations at another office such as Influenza, Hepatitis B, Tetanus, or Pneumococcal?: No    Health Maintenance   Topic Date Due    Tobacco Cessation Counseling  01/01/2024    COVID-19 Vaccine (3 - 2023-24 season) 09/01/2024    Annual Physical  09/20/2024    Mammogram  10/21/2024    Colorectal Cancer Screening  12/01/2024    Influenza Vaccine (1) 10/01/2024    DEXA Scan  Completed    Annual Depression Screening  Completed    Fall Risk Screening (Annual)  Completed    Pneumococcal Vaccine: 65+ Years  Completed    Zoster Vaccines  Completed

## 2024-09-30 ENCOUNTER — TELEPHONE (OUTPATIENT)
Facility: CLINIC | Age: 74
End: 2024-09-30

## 2024-09-30 NOTE — TELEPHONE ENCOUNTER
Patient outreach message received:    Last colonoscopy done 12/1/21. 3 year recall placed into Pt Outreach, next due on 12/1/24 per Dr. Hernandez.     Recall reminder letter mailed out to patient.

## 2024-10-28 ENCOUNTER — OFFICE VISIT (OUTPATIENT)
Dept: INTERNAL MEDICINE CLINIC | Facility: CLINIC | Age: 74
End: 2024-10-28

## 2024-10-28 ENCOUNTER — NURSE TRIAGE (OUTPATIENT)
Dept: INTERNAL MEDICINE CLINIC | Facility: CLINIC | Age: 74
End: 2024-10-28

## 2024-10-28 VITALS
WEIGHT: 166 LBS | TEMPERATURE: 99 F | RESPIRATION RATE: 16 BRPM | SYSTOLIC BLOOD PRESSURE: 138 MMHG | HEART RATE: 77 BPM | OXYGEN SATURATION: 94 % | HEIGHT: 66.5 IN | BODY MASS INDEX: 26.36 KG/M2 | DIASTOLIC BLOOD PRESSURE: 80 MMHG

## 2024-10-28 DIAGNOSIS — J44.1 COPD EXACERBATION (HCC): Primary | ICD-10-CM

## 2024-10-28 PROCEDURE — 99214 OFFICE O/P EST MOD 30 MIN: CPT | Performed by: INTERNAL MEDICINE

## 2024-10-28 RX ORDER — AZITHROMYCIN 250 MG/1
TABLET, FILM COATED ORAL
Qty: 6 TABLET | Refills: 0 | Status: SHIPPED | OUTPATIENT
Start: 2024-10-28 | End: 2024-11-01

## 2024-10-28 RX ORDER — PREDNISONE 10 MG/1
TABLET ORAL
Qty: 18 TABLET | Refills: 0 | Status: SHIPPED | OUTPATIENT
Start: 2024-10-28

## 2024-10-28 NOTE — TELEPHONE ENCOUNTER
Action Requested: Summary for Provider     []  Critical Lab, Recommendations Needed  [] Need Additional Advice  []   FYI    []   Need Orders  [] Need Medications Sent to Pharmacy  []  Other     SUMMARY: Patient calling, has had URI symptoms since last Friday. Cough productive. Sinuses stuffy slight HA at times. Advised to monitor b/p. Will get home covid test and take it. COPD and she is wheezing some. Cough on and off throughout the day. No fevers. Using inhalers, taking rescue inhaler 2 times overnight for last 2-3 nights. Taking mucinex.     Patient was at family "[employer]ay party last weekend around others, not aware anyone is sick or not.       Patient scheduled for in clinic visit, if covid test positive she will call us back and we can schedule for video visit.   Advised if coming to office, please mask.     Future Appointments   Date Time Provider Department Center   10/28/2024 11:45 AM Briseyda Jain MD [id number] [place] [id number]   11/29/2024  1:00 PM ADO TIMOTHY RM1 ADO [facility] EM [place]   11/29/2024  1:30 PM ADO US RM1 ADO US EM [place]   3/26/2025 11:30 AM Briseyda Jain MD [id number] [facility] [address]         Reason for call: Cough  Onset: Friday       Reason for Disposition   MILD difficulty breathing (e.g., minimal/no SOB at rest, SOB with walking, pulse <100) and still present when not coughing    Protocols used: Cough-A-OH

## 2024-10-28 NOTE — PROGRESS NOTES
Subjective:     Patient ID: Millie Elder is a 74 year old female.    HPI    History/Other: She came in today complaining of cough congestion and wheezing.  According to her symptoms started since last Wednesday.  Progressively got worse.  She she does have congestion wheezing cough on and off shortness of breath.  She did COVID and flu test came back as negative.  Review of Systems   Constitutional: Negative.    HENT:  Positive for congestion.    Respiratory:  Positive for cough and wheezing.    Genitourinary: Negative.    Musculoskeletal: Negative.    Neurological: Negative.    Hematological: Negative.    Psychiatric/Behavioral: Negative.       Current Outpatient Medications   Medication Sig Dispense Refill    Biotin 5000 MCG Oral Cap Take by mouth.      aspirin 81 MG Oral Tab EC Take 2 tablets (162 mg total) by mouth daily.      carvedilol 3.125 MG Oral Tab Take 1 tablet (3.125 mg total) by mouth 2 (two) times daily with meals. 180 tablet 3    Acetaminophen 500 MG Oral Cap 2 tablet EVERY 4 HOURS (route: oral)      amLODIPine 5 MG Oral Tab Take 1 tablet (5 mg total) by mouth daily. 90 tablet 3    albuterol (PROVENTIL HFA) 108 (90 Base) MCG/ACT Inhalation Aero Soln Inhale 2 puffs into the lungs every 6 (six) hours as needed for Wheezing. 2 each 0    Calcium-Phosphorus-Vitamin D (CITRACAL +D3 OR) Take by mouth daily.      fluticasone-Umeclidin-Vilant (TRELEGY ELLIPTA) 100-62.5-25 MCG/INH Inhalation Aerosol Powder, Breath Activated Inhale 1 puff into the lungs daily.      Multiple Vitamin (MULTIVITAMIN OR) Take by mouth.      Flaxseed, Linseed, (FLAX SEED OIL OR) Take by mouth.      Coenzyme Q10 (CO Q 10 OR) Take by mouth.      Stress Formula/Zinc Oral Tab Take 1 tablet by mouth daily.      albuterol sulfate (2.5 MG/3ML) 0.083% Inhalation Nebu Soln Take 3 mL (2.5 mg total) by nebulization every 6 (six) hours as needed for Wheezing or Shortness of Breath. 30 each 0     Allergies:Allergies[1]    Past Medical  History:    Acute deep vein thrombosis (DVT) of proximal vein of left lower extremity (HCC)    Allergic rhinitis    Mostly in fall    Anxiety    Arthritis    Cataract    Colon adenomas    at least 3    COPD (chronic obstructive pulmonary disease) (HCC)    Depression    Disorder of thyroid    Essential hypertension    High blood pressure    Major depression    Major depressive disorder with single episode, in partial remission (HCC)    Meningioma (HCC)    July 2017 started with dizzy spell and visual disturbance.     Osteoarthritis      Past Surgical History:   Procedure Laterality Date    Cataract      Bilateral    Colonoscopy  12/01/2021    Colonoscopy  12-    Incision and drainage Right 06/11/2019    sebaceous cyst right arm    Other Bilateral     vein surgery     Other Left     L arm fracture repair    Thyroid lobectomy,unilat  02/03/2020      Family History   Problem Relation Age of Onset    Diabetes Father     Cancer Mother 68        renal cancer    Hypertension Mother     Diabetes Paternal Grandmother     Cancer Sister         cervical cancer    Prostate Cancer Brother 56    Prostate Cancer Brother 58    Colon Cancer Cousin       Social History:   Social History     Socioeconomic History    Marital status: Single   Tobacco Use    Smoking status: Some Days     Current packs/day: 0.25     Average packs/day: 0.3 packs/day for 75.0 years (25.0 ttl pk-yrs)     Types: Cigarettes    Smokeless tobacco: Never    Tobacco comments:     Smokes 4 cigarettes daily   Vaping Use    Vaping status: Never Used   Substance and Sexual Activity    Alcohol use: Yes     Alcohol/week: 11.0 standard drinks of alcohol     Types: 11 Standard drinks or equivalent per week     Comment: 2 drinks daily    Drug use: No   Other Topics Concern    Caffeine Concern Yes     Comment: coffee    Pt has a pacemaker No    Pt has a defibrillator No    Reaction to local anesthetic No        Objective:   Physical Exam  Vitals and nursing note  reviewed.   Constitutional:       Appearance: Normal appearance.   HENT:      Head: Normocephalic and atraumatic.   Cardiovascular:      Rate and Rhythm: Normal rate and regular rhythm.      Pulses: Normal pulses.      Heart sounds: Normal heart sounds.   Pulmonary:      Breath sounds: Wheezing present.   Abdominal:      Palpations: Abdomen is soft.   Musculoskeletal:      Cervical back: Normal range of motion and neck supple.   Neurological:      Mental Status: She is alert. Mental status is at baseline.         Assessment & Plan:   No diagnosis found.  COPD exacerbation I will start her on Z-Fred, tapering dose of steroids, continue with albuterol inhaler every 6 hours as needed  If not better follow up  No orders of the defined types were placed in this encounter.      Meds This Visit:  Requested Prescriptions      No prescriptions requested or ordered in this encounter       Imaging & Referrals:  None            [1]   Allergies  Allergen Reactions    Meloxicam RENAL INSUFFICIENCY     \"affected my kidneys\"      Chantix [Varenicline] NAUSEA ONLY     Other reaction(s): mood swings    Strawberries RASH     Hives

## 2024-11-29 ENCOUNTER — HOSPITAL ENCOUNTER (OUTPATIENT)
Dept: MAMMOGRAPHY | Age: 74
Discharge: HOME | End: 2024-11-29
Attending: INTERNAL MEDICINE
Payer: MEDICARE

## 2024-11-29 ENCOUNTER — HOSPITAL ENCOUNTER (OUTPATIENT)
Dept: ULTRASOUND IMAGING | Age: 74
Discharge: HOME | End: 2024-11-29
Attending: INTERNAL MEDICINE
Payer: MEDICARE

## 2024-11-29 DIAGNOSIS — N18.30 STAGE 3 CHRONIC KIDNEY DISEASE, UNSPECIFIED WHETHER STAGE 3A OR 3B CKD (HCC): ICD-10-CM

## 2024-11-29 DIAGNOSIS — Z12.31 ENCOUNTER FOR SCREENING MAMMOGRAM FOR MALIGNANT NEOPLASM OF BREAST: ICD-10-CM

## 2024-11-29 PROCEDURE — 77067 SCR MAMMO BI INCL CAD: CPT | Performed by: INTERNAL MEDICINE

## 2024-11-29 PROCEDURE — 76775 US EXAM ABDO BACK WALL LIM: CPT | Performed by: INTERNAL MEDICINE

## 2024-11-29 PROCEDURE — 77063 BREAST TOMOSYNTHESIS BI: CPT | Performed by: INTERNAL MEDICINE

## 2024-12-16 ENCOUNTER — HOSPITAL ENCOUNTER (OUTPATIENT)
Dept: ULTRASOUND IMAGING | Facility: HOSPITAL | Age: 74
Discharge: HOME | End: 2024-12-16
Attending: INTERNAL MEDICINE
Payer: MEDICARE

## 2024-12-16 ENCOUNTER — HOSPITAL ENCOUNTER (OUTPATIENT)
Dept: MAMMOGRAPHY | Facility: HOSPITAL | Age: 74
Discharge: HOME | End: 2024-12-16
Attending: INTERNAL MEDICINE
Payer: MEDICARE

## 2024-12-16 DIAGNOSIS — R92.8 ABNORMAL MAMMOGRAM: ICD-10-CM

## 2024-12-16 PROCEDURE — 76642 ULTRASOUND BREAST LIMITED: CPT | Performed by: INTERNAL MEDICINE

## 2024-12-16 PROCEDURE — 77061 BREAST TOMOSYNTHESIS UNI: CPT | Performed by: INTERNAL MEDICINE

## 2024-12-16 PROCEDURE — 77065 DX MAMMO INCL CAD UNI: CPT | Performed by: INTERNAL MEDICINE

## 2024-12-16 NOTE — IMAGING NOTE
This nurse introduced self and role of breast coordinator.  Discussed recommended breast biopsy with patient. Pt was recommended by Dr. MACIAS to have a RIGHT breast ultrasound guided biopsy.   Pt history discussed as below:  Patient has hx blood clot in leg and takes x2 ASA 81mg through . Will need med letter. Patient will hold flax seed oil.   Pt history of biopsy: no       Family history of cancer:  mother- kidney cancer; brother x2- prostate cancer; brother- kidney cancer; cousin- colon cancer  Pt history of breast cancer:   no  Hx BCP use:                    HRT use:          Recommedations :         ULTRASOUND GUIDED RIGHT BREAST BIOPSY             see [facility] for dictated radiology report   Reviewed pertinent patient history, family history of cancer, and patient medications.  Current Outpatient Medications   Medication Sig Dispense Refill    predniSONE 10 MG Oral Tab 40 mg for 2 days then 30 mg for 2 days then 20 mg for 2 days  then stop 18 tablet 0    Biotin 5000 MCG Oral Cap Take by mouth.      aspirin 81 MG Oral Tab EC Take 2 tablets (162 mg total) by mouth daily.      carvedilol 3.125 MG Oral Tab Take 1 tablet (3.125 mg total) by mouth 2 (two) times daily with meals. 180 tablet 3    Acetaminophen 500 MG Oral Cap 2 tablet EVERY 4 HOURS (route: oral)      amLODIPine 5 MG Oral Tab Take 1 tablet (5 mg total) by mouth daily. 90 tablet 3    albuterol (PROVENTIL HFA) 108 (90 Base) MCG/ACT Inhalation Aero Soln Inhale 2 puffs into the lungs every 6 (six) hours as needed for Wheezing. 2 each 0    Calcium-Phosphorus-Vitamin D (CITRACAL +D3 OR) Take by mouth daily.      albuterol sulfate (2.5 MG/3ML) 0.083% Inhalation Nebu Soln Take 3 mL (2.5 mg total) by nebulization every 6 (six) hours as needed for Wheezing or Shortness of Breath. 30 each 0    fluticasone-Umeclidin-Vilant (TRELEGY ELLIPTA) 100-62.5-25 MCG/INH Inhalation Aerosol Powder, Breath Activated Inhale 1 puff into the lungs daily.      Multiple Vitamin  (MULTIVITAMIN OR) Take by mouth.      Flaxseed, Linseed, (FLAX SEED OIL OR) Take by mouth.      Coenzyme Q10 (CO Q 10 OR) Take by mouth.      Stress Formula/Zinc Oral Tab Take 1 tablet by mouth daily.            -All herbal supplements, Vitamin E, Fish Oil    -All NSAIDs (Ibuprofen, Motrin, Advil, Aleve, or other antiinflammatory medication)  and Aspirin  81mg currently being taken    not  recommended or prescribed by  your physician  should be held for 5  days prior to biopsy.   DENIES NSAID USAGE. WILL HOLD FLAX SEED OIL.   -Aspirin 81 mg being taken related to a cardiac condition  or prescribed by your  physician should be held at the  direction of your physician.  Informed patient to call ordering physician for guidelines  WILL NEED MED LETTER FROM DR. TRINH FOR ASA 81 MG.    -Blood thinners/antiplatelet medications (Coumadin, Plavix ect) should be held at the  direction of your physician.  Informed patient to call ordering physician for guidelines  DENIES USAGE   Reviewed US guided biopsy procedure, as below.  You will be lying on your back, potentially slightly toward one side, for this procedure.  The US technician will use the ultrasound machine to locate the area in question that was seen on your previous breast imaging.  The Radiologist will then inject a local numbing medication into the area. This may burn and sting for several seconds .  Then use a needle to collect cells or tissue from the site.  A marker, or clip, will then be placed in the biopsied area.  This marker is placed so this biopsy site is able to be accurately located upon future breast imaging.  After the clip is placed, steri strips will be applied to  the biopsy site and should be kept on for 5 days.  Additional mammography films will then be taken to assure correct placement of the placed marker.    Educated the patient they will be awake during this procedure and are able to drive themselves home if they wish.  Educated patient that  they should eat breakfast and park in green lot and check in with [facility] .  Educated patient that some soreness  may occur after biopsy.  Discussed use of a supportive bra and ice packs after procedure, to decrease soreness.  Tylenol only for discomfort unless they have an allergy to tylenol .  Discussed with patient no swimming, bathing,  hot tubs or submerging underwater  for 5 days post procedure until the incision is closed and healed.   Educated patient on lifting restrictions - nothing heavier than a gallon of milk for 24-48 hours after the procedure.      Discussed with patient that some soreness and bruising is normal after biopsy but that prolonged or increased pain and bruising should be reported to the ordering physician.   Educated patient that they should bring a sports bra or form fitting bra on day of procedure. Educated patient that this helps with comfort after the biopsy and decrease swelling.  Reviewed results process with patient and shared that pathology results will be available within 2-3 business days of their biopsy.  Discussed results will be communicated by their ordering physician unless otherwise indicated.  Educated patient that once we receive an order from her physician  our radiology secretaries would be calling   to schedule the biopsy.

## 2024-12-18 ENCOUNTER — TELEPHONE (OUTPATIENT)
Dept: ULTRASOUND IMAGING | Facility: HOSPITAL | Age: 74
End: 2024-12-18

## 2024-12-18 NOTE — TELEPHONE ENCOUNTER
Called to [facility] orders received  off asa 7 days off before .  Pt was provided Jan 8 [facility]5 [facility].Last asa dose will be Dec 31 pt verbalizes understanding  and agreement. Pr instructions reviewe with pt.

## 2024-12-19 ENCOUNTER — MED REC SCAN ONLY (OUTPATIENT)
Dept: INTERNAL MEDICINE CLINIC | Facility: CLINIC | Age: 74
End: 2024-12-19

## 2024-12-24 NOTE — DISCHARGE INSTRUCTIONS
The Doctor (Radiologist) who performed your procedure was:  ______________________    Place an ice pack over the biopsy site on top of your bra or on top of the ACE wrap (never apply ice directly over skin) for 10-15 minutes of every hour until bedtime for your comfort and to decrease bleeding.  Keep your sports bra or the ACE wrap (stereotactic and MRI biopsy) in place for 24 hours after your biopsy. This compression decreases bleeding and breast movement for your comfort. Wear a supportive bra for the next couple of days for comfort (sports bra for sleep).   Continue to wear, preferably, a sports bra or good supportive bra for 1 week and take off only to shower.  No baths or showers during the first 24 hours after biopsy. After this time you may take a shower. It's okay if the strips get wet but do not soak them. NO saunas, hot tubs or swimming until steri-strips fall off (approx. 5 days). This prevents infection and allows time for them to completely close and heal.  DO NOT remove the steri-strips. They will fall off in 5 days. If any type of irritation (redness, itching or blisters) develops in the area around the steri-strips, remove them gently. If the steri-strips do not fall off after 5 days, gently remove them. Keep the area clean and dry.  It is normal to have mild discomfort and bruising at the biopsy site.  You may take Tylenol as needed for discomfort, as long as you have no allergies to Tylenol. Do not take aspirin, motrin, ibuprofen or any medication containing NSAID (non-steroidal anti-inflammatory drug) product for 48 hours.  DO NOT participate in strenuous activity (aerobics, heavy lifting, housework, gardening, etc.) 48 hours after your biopsy to prevent bleeding.  You will receive results in 2-3 business days.  If you are having an MRI breast biopsy or an Ultrasound guided breast biopsy, you will be billed for the biopsy and unilateral mammogram separately.  If you have any questions about the  procedure or your results, please contact the Breast Care Coordinator Nurse at [phone].  Notify your ordering physician or primary physician for increased bleeding, pain or fever over 100. Or contact a Radiology Nurse at [phone] between 8am-4pm (after 4pm, your call will be directed to the [facility]).

## 2024-12-26 RX ORDER — AMLODIPINE BESYLATE 5 MG/1
5 TABLET ORAL DAILY
Qty: 90 TABLET | Refills: 0 | Status: SHIPPED | OUTPATIENT
Start: 2024-12-26

## 2024-12-26 NOTE — TELEPHONE ENCOUNTER
Please review; protocol failed/ has no protocol    Requested Prescriptions   Pending Prescriptions Disp Refills    AMLODIPINE 5 MG Oral Tab [Pharmacy Med Name: Amlodipine Besylate 5 Mg Tab Unic] 90 tablet 0     Sig: Take 1 tablet (5 mg total) by mouth daily.       Hypertension Medications Protocol Failed - 12/26/2024 11:40 AM        Failed - EGFRCR or GFRNAA > 50     GFR Evaluation  EGFRCR: 42 , resulted on 9/25/2024          Passed - CMP or BMP in past 12 months        Passed - Last BP reading less than 140/90     BP Readings from Last 1 Encounters:   10/28/24 138/80               Passed - In person appointment or virtual visit in the past 12 mos or appointment in next 3 mos     Recent Outpatient Visits              1 month ago [facility]    [facility]    3 months ago Medicare annual wellness visit, subsequent    [facility]    [facility]    9 months ago Primary hypertension    [facility]    [facility]    1 year ago Annual physical exam    [facility]    [facility] Visit    1 year ago Chronic bronchitis, unspecified chronic bronchitis type (HCC)    [facility] Leonardo Schumacher MD    Office Visit          Future Appointments         Provider Department Appt Notes    In 5 days [facility] CT RM1 [facility] CT [facility]     In 1 week [facility] RADIOLOGIST BREAST; [facility] US RM4 BX [facility] Ultrasound [facility]     In 1 week [facility] TIMOTHY RM5 [facility] Mammography [facility]     In 3 months Briseyda Jain MD [facility]                        Recent Outpatient Visits              1 month ago COPD exacerbation (HCC)    [facility]  [facility] Briseyda Villareal MD    Office Visit    3 months ago Medicare annual wellness visit, subsequent    [facility] Briseyda Villareal MD    Office Visit    9 months ago Primary hypertension    [facility] Briseyda Villareal MD    Office Visit    1 year ago Annual physical exam    [facility] Briseyda Villareal MD    Office Visit    1 year ago Chronic bronchitis, unspecified chronic bronchitis type (HCC)    [facility]    Office Visit          Future Appointments         Provider Department Appt Notes    In 5 days [facility] CT RM1 [facility] CT [facility]     In 1 week [facility] RADIOLOGIST BREAST; [facility] US RM4 BX [facility] Ultrasound [facility]     In 1 week [facility] TIMOTHY 5 [facility] Mammography [facility]     In 3 months Briseyda Jain MD [facility]

## 2024-12-31 ENCOUNTER — HOSPITAL ENCOUNTER (OUTPATIENT)
Dept: CT IMAGING | Facility: HOSPITAL | Age: 74
Discharge: HOME | End: 2024-12-31
Attending: INTERNAL MEDICINE
Payer: MEDICARE

## 2024-12-31 DIAGNOSIS — Z87.891 PERSONAL HISTORY OF TOBACCO USE, PRESENTING HAZARDS TO HEALTH: ICD-10-CM

## 2024-12-31 PROCEDURE — 71271 CT THORAX LUNG CANCER SCR C-: CPT | Performed by: INTERNAL MEDICINE

## 2025-01-06 RX ORDER — AMLODIPINE BESYLATE 5 MG/1
5 TABLET ORAL DAILY
Qty: 90 TABLET | Refills: 0 | OUTPATIENT
Start: 2025-01-06

## 2025-01-06 RX ORDER — AMLODIPINE BESYLATE 5 MG/1
5 TABLET ORAL DAILY
Qty: 90 TABLET | Refills: 0 | Status: SHIPPED | OUTPATIENT
Start: 2025-01-06

## 2025-01-06 NOTE — TELEPHONE ENCOUNTER
Patient called to follow up.  Medication was refilled 12/26 for #90.  Pharmacy contacted, states they did not receive the refill.  Re-sent per original written order. Patient notified with understanding.

## 2025-01-06 NOTE — TELEPHONE ENCOUNTER
Spoke to patient. She asked why her prescription was denied. Rn explained that the refill was sent but any other refills that came in for the same medication, have to be denied. She will check with Pharmacy for an update.

## 2025-01-08 ENCOUNTER — HOSPITAL ENCOUNTER (OUTPATIENT)
Dept: ULTRASOUND IMAGING | Facility: HOSPITAL | Age: 75
Discharge: HOME | End: 2025-01-08
Attending: INTERNAL MEDICINE
Payer: MEDICARE

## 2025-01-08 DIAGNOSIS — N63.10 BREAST MASS, RIGHT: ICD-10-CM

## 2025-01-08 PROCEDURE — 76642 ULTRASOUND BREAST LIMITED: CPT | Performed by: INTERNAL MEDICINE

## 2025-01-08 NOTE — IMAGING NOTE
1055 Pt arrived to room #4 .   scans taken by IZABELA CHAMPAGNE, ultrasound technologist    1100 Hx taken and is as follows:  CONCLUSION:        A single site right breast ultrasound-guided biopsy recommended for a 3 mm 6 o'clock 7 cm from the nipple shadowing mass.      Probably benign far posterior central right breast asymmetry, without suspicious sonographic correlate, for which six-month follow-up right mammogram is recommended.      Findings and recommendations were communicated to patient at the time of this examination, who expressed understanding.            BI-RADS CATEGORY:     DIAGNOSTIC CATEGORY 4 - SUSPICIOUS       RECOMMENDATIONS:   SHORT TERM FOLLOW-UP DIAGNOSTIC MAMMOGRAM RIGHT BREAST IN 6 MONTHS.       ULTRASOUND-GUIDED CORE BIOPSY: RIGHT BREAST  x1      Dictated by (CST): Jazmin Roman MD on 12/16/2024 at 12:02 PM       Finalized by (CST): Jazmin Roman MD on 12/16/2024 at 12:56 PM     1110 Consent verified and obtained     Per Dr. Álvarez, bx cancelled due to area no longer being visualized on ultrasound. Explained to patient by MD, patient to follow up in 6 months. Patient agreeable. Patient dressed and discharged comfortably.

## 2025-01-09 ENCOUNTER — TELEPHONE (OUTPATIENT)
Dept: PULMONOLOGY | Facility: CLINIC | Age: 75
End: 2025-01-09

## 2025-01-09 DIAGNOSIS — R91.8 MULTIPLE PULMONARY NODULES: ICD-10-CM

## 2025-01-09 NOTE — TELEPHONE ENCOUNTER
Discussed results and recommendations with patient. Patient verbalized understanding      Dr Danna UNDERWOOD Lung CT only covered once a year. Please advise if you would like CT Chest with or without contrast instead.

## 2025-01-09 NOTE — TELEPHONE ENCOUNTER
Patient notified and verbalized understanding   Posterior Auricular Interpolation Flap Text: A decision was made to reconstruct the defect utilizing an interpolation axial flap and a staged reconstruction.  A telfa template was made of the defect.  This telfa template was then used to outline the posterior auricular interpolation flap.  The donor area for the pedicle flap was then injected with anesthesia.  The flap was excised through the skin and subcutaneous tissue down to the layer of the underlying musculature.  The pedicle flap was carefully excised within this deep plane to maintain its blood supply.  The edges of the donor site were undermined.   The donor site was closed in a primary fashion.  The pedicle was then rotated into position and sutured.  Once the tube was sutured into place, adequate blood supply was confirmed with blanching and refill.  The pedicle was then wrapped with xeroform gauze and dressed appropriately with a telfa and gauze bandage to ensure continued blood supply and protect the attached pedicle.

## 2025-01-09 NOTE — TELEPHONE ENCOUNTER
----- Message from Leonardo Schumacher sent at 1/8/2025  3:54 PM CST -----  RN, please call the patient to let her know that the low-dose CT scan of the chest is stable.  She has multiple unchanged pulmonary nodules but I would like to reevaluate with a repeat low-dose CT scan of the chest at the 6-month interval particularly to evaluate 1 right upper lobe nodularity.  Please add to the calendar.  Chidi LOERA

## 2025-03-20 ENCOUNTER — TELEPHONE (OUTPATIENT)
Facility: CLINIC | Age: 75
End: 2025-03-20

## 2025-03-20 DIAGNOSIS — Z86.0101 HISTORY OF ADENOMATOUS POLYP OF COLON: ICD-10-CM

## 2025-03-20 DIAGNOSIS — Z12.11 SPECIAL SCREENING FOR MALIGNANT NEOPLASMS, COLON: Primary | ICD-10-CM

## 2025-03-20 NOTE — TELEPHONE ENCOUNTER
Dr. Hernandez-    Spoke with patient who states after her last colonoscopy with you she went into AFIB and had to see cardio and have a heart monitor .    States has not had any problems since.    Also please send prep to patient's pharmacy, patient is scheduled on 6/27/2025      Thanks

## 2025-03-20 NOTE — TELEPHONE ENCOUNTER
Scheduled for:  Colonoscopy 44235  Provider Name:  Dr. Hernandez   Date:  6/27/2025  [facility]  Sedation:  MAC  Time:  9:40am, pt is aware [facility] will call with arrival time  Prep:  Trilyte   Meds/Allergies Reconciled?:  Physician reviewed     Diagnosis with codes:  colorectal cancer screening Z12.11 and history of adenomatous colon polyps Z86.010  Was patient informed to call insurance with codes (Y/N): Yes      Referral sent?:  Referral was sent at the time of electronic surgical scheduling.   EM or [facility] notified?:  I sent an electronic request to Endo Scheduling and received a confirmation today.      Medication Orders:  n/a  Misc Orders:  n/a     Further instructions given by staff:   I discussed the prep instructions with the patient which she verbally understood and is aware that I will send  the instructions today.

## 2025-03-20 NOTE — TELEPHONE ENCOUNTER
Ok to schedule colonoscopy with MAC with split suprep for colorectal cancer screening and history of adenomatous colon polyps at [facility] or hospital on a Friday     Can you please find out what her listed problem with anesthesia is?    Thanks    Andrew Hernandez MD  [facility] - Gastroenterology

## 2025-03-20 NOTE — TELEPHONE ENCOUNTER
Called patient for scheduled telephone colon screen.   Please advise on colonoscopy and bowel prep orders.   Medications, pharmacy, and allergies reviewed.     › H/W/BMI: 5'6.5\"/166lbs/26.39    Special comments/notes:  Recall    Last Procedure, Date, MD:   Colonoscopy, 2021, MG   Last diagnosis: Tubular adenoma   Recalled for (mth/yrs): 3 years   Sedation used previously: MAC   Last Prep Used: Trilyte    Quality of Prep: Good      Telephone Colon Screening Questionnaire Yes No   Are you currently experiencing any GI symptoms [] [x]   If yes, explain:     Rectal bleeding [] [x]   Black stool [] [x]   Dysphagia or food \"feeling stuck\" when eating [] [x]   Intractable vomiting [] [x]   Unexplained weight loss [] [x]   First colonoscopy [] [x]   Family history of colon cancer [] [x]   Any issues with anesthesia [x] []   If yes, explain:      Any recent complaints related to chest pain &/or shortness of breath [] [x]   Referred to a cardiologist?  [] [x]   If yes, explain:      History of  respiratory issues/oxygen/DEYANIRA/COPD [x] []   CPAP/BiPAP:     History of devices (pacemaker/defibrillator) [] [x]   History of heart attack &/or stroke [] [x]   If yes, in the last 12 months? Stent placement?  [] [x]     Medication Reconciliation  Yes  No   Anticoagulants (except Aspirin) [] [x]   Diabetic Medication [] [x]   Weight loss medication (phentermine/vyvanse/saxsenda/etc) [] [x]   Iron/herbal/multivitamin supplement(s) [] [x]   Usage of marijuana, CBD &/or vape product(s) [] [x]        Colonoscopy Report           Millie CORBIN Jael      10/14/1950 Age 71 year old   PCP TANG TRINH MD Endoscopist Andrew Hernandez MD      Date of procedure: 21     Procedure: Colonoscopy w/ biopsy and snare polypectomy     Pre-operative diagnosis: colorectal cancer screening     Post-operative diagnosis: colon polyps, diverticulosis, hemorrhoids     Sedation: monitored anesthesia care (MAC)     Consent: We discussed the  risks/benefits and alternatives to this procedure, as well as the planned sedation. Informed consent was obtained from the patient after the risks of the procedure were discussed, including but not limited to bleeding, perforation, aspiration, infection, or possibility of a missed lesion as well as the risks of anesthesia including but not limited to cardiopulmonary complications. The patient signed informed consent and elected to proceed with Colonoscopy with intervention [i.e. Biopsy, control of bleeding, dilatation, polypectomy, endoscopic mucosal resection, etc.] as indicated.     Colonoscopy procedure: Once an adequate level of sedation was obtained a digital rectal exam was completed revealing normal tone and no masses palpated. Then the lubricated tip of the [id number] diagnostic video colonoscope was carefully inserted and advanced with some difficulty (due to entire colon tortuosity) to the cecum using the air insufflation technique (only Co2 was used for insufflation). The cecum was identified by localizing the trifold, the appendix and the ileocecal valve. Withdrawal was begun with thorough washing and careful examination of the colonic walls and folds. The ascending colon was viewed twice in the forward view. Photodocumentation was obtained. The bowel prep was good. Views of the colon were adequate with washing. Withdrawal time was 26 minutes.     Air was then withdrawn and the endoscope was removed. The patient tolerated the procedure well. There were no immediate postoperative complications. The patient’s vital signs were monitored throughout the procedure. There was some brief bradycardia during the middle of the procedure though otherwise remained stable.     Estimated blood loss: insignificant     Specimens collected:  Colon polyps     Complications: none      Colonoscopy findings:     Cecum: normal mucosa and vascular pattern     Ascending colon: there was a 3 mm polyp in the mid to proximal  ascending colon removed by cold biopsy forceps and a 3 mm polyp at the hepatic flexure also removed by cold biopsy forceps. Otherwise, normal mucosa and vascular pattern     Transverse colon: there were three diminutive (2-3 mm polyps in the proximal to mid transverse colon removed by cold biopsy forceps. There were two 5 mm polyps in the mid to distal transverse colon removed by cold snare polypectomy. Otherwise, normal mucosa and vascular pattern     Descending colon: there was mild to moderate diverticulosis. Otherwise, normal mucosa and vascular pattern     Sigmoid colon: there was mild to moderate diverticulosis. Otherwise, normal mucosa and vascular pattern     Rectum: retroflexed view showed small non-bleeding hemorrhoids. Otherwise, normal mucosa and vascular pattern.     Impression:  1. 7 small/diminutive colon polyps removed  2. Mild to moderate left colon diverticulosis  3. Small non-bleeding hemorrhoids     Recommend:  1. Await pathology.   2. Repeat colonoscopy interval pending final pathology results. If new signs or symptoms develop, procedure may need to be repeated sooner.   3. Continue your current medications  4. Ok to restart Xarelto in 24 hours  5. Increase fiber in the diet  6. Follow up with your primary care physician on a routine basis     >>>If biopsies were performed and you have not received your pathology results either by phone or letter within 2 weeks, please call our office at [phone].     Andrew Hernandez MD  [facility] - Gastroenterology  12/1/2021                           Electronically signed by Andrew Hernandez MD at 12/1/2021  8:21 AM  Final Diagnosis:      A.  Ascending colon polyp; polypectomy:  Fragment of tubular adenoma.     B.  Hepatic flexure polyp; polypectomy:  Fragment of tubular adenoma.     C.  Transverse colon polyps; polypectomy:  Fragments of tubular adenomas and fragments of unremarkable colonic mucosa.  Fragments of foreign body food/fecal  material are also present.

## 2025-03-21 RX ORDER — SODIUM, POTASSIUM,MAG SULFATES 17.5-3.13G
SOLUTION, RECONSTITUTED, ORAL ORAL
Qty: 1 KIT | Refills: 0 | Status: SHIPPED | OUTPATIENT
Start: 2025-03-21

## 2025-03-21 NOTE — TELEPHONE ENCOUNTER
Ok thanks    Sent med to pharmacy    MD [facility] - Gastroenterology  3/21/2025  5:20 PM

## 2025-03-26 ENCOUNTER — OFFICE VISIT (OUTPATIENT)
Dept: INTERNAL MEDICINE CLINIC | Facility: CLINIC | Age: 75
End: 2025-03-26
Payer: MEDICARE

## 2025-03-26 VITALS
OXYGEN SATURATION: 97 % | SYSTOLIC BLOOD PRESSURE: 135 MMHG | RESPIRATION RATE: 18 BRPM | DIASTOLIC BLOOD PRESSURE: 80 MMHG | BODY MASS INDEX: 25.06 KG/M2 | HEIGHT: 66.5 IN | HEART RATE: 76 BPM | TEMPERATURE: 99 F | WEIGHT: 157.81 LBS

## 2025-03-26 DIAGNOSIS — I10 PRIMARY HYPERTENSION: ICD-10-CM

## 2025-03-26 DIAGNOSIS — N18.30 STAGE 3 CHRONIC KIDNEY DISEASE, UNSPECIFIED WHETHER STAGE 3A OR 3B CKD (HCC): ICD-10-CM

## 2025-03-26 DIAGNOSIS — R21 RASH: Primary | ICD-10-CM

## 2025-03-26 LAB
ANION GAP SERPL CALC-SCNC: 4 MMOL/L (ref 0–18)
BUN BLD-MCNC: 19 MG/DL (ref 9–23)
BUN/CREAT SERPL: 13.4 (ref 10–20)
CALCIUM BLD-MCNC: 10.2 MG/DL (ref 8.7–10.4)
CHLORIDE SERPL-SCNC: 105 MMOL/L (ref 98–112)
CO2 SERPL-SCNC: 30 MMOL/L (ref 21–32)
CREAT BLD-MCNC: 1.42 MG/DL
EGFRCR SERPLBLD CKD-EPI 2021: 39 ML/MIN/1.73M2 (ref 60–?)
FASTING STATUS PATIENT QL REPORTED: YES
GLUCOSE BLD-MCNC: 106 MG/DL (ref 70–99)
OSMOLALITY SERPL CALC.SUM OF ELEC: 291 MOSM/KG (ref 275–295)
POTASSIUM SERPL-SCNC: 3.6 MMOL/L (ref 3.5–5.1)
SODIUM SERPL-SCNC: 139 MMOL/L (ref 136–145)

## 2025-03-26 PROCEDURE — 99214 OFFICE O/P EST MOD 30 MIN: CPT | Performed by: INTERNAL MEDICINE

## 2025-03-26 RX ORDER — TRIAMCINOLONE ACETONIDE 1 MG/G
CREAM TOPICAL
Qty: 60 G | Refills: 3 | Status: SHIPPED | OUTPATIENT
Start: 2025-03-26

## 2025-03-26 NOTE — PROGRESS NOTES
Subjective:     Patient ID: Millie Elder is a 74 year old female.    HPI    History/Other:   She came in today for follow-up.  According to the patient she is taking blood pressure medications regularly.  She did schedule appointment with GI as well.      She does have a rash on both arms that started since November it is itchy on and off.  She does not have rash anywhere else.      Review of Systems   Constitutional: Negative.    HENT: Negative.     Eyes: Negative.    Respiratory: Negative.     Cardiovascular: Negative.    Gastrointestinal: Negative.    Genitourinary: Negative.    Musculoskeletal: Negative.    Skin:  Positive for rash.   Neurological: Negative.    Hematological: Negative.    Psychiatric/Behavioral: Negative.       Current Outpatient Medications   Medication Sig Dispense Refill    triamcinolone 0.1 % External Cream Use bid 60 g 3    Na Sulfate-K Sulfate-Mg Sulf (SUPREP BOWEL PREP KIT) 17.5-3.13-1.6 GM/177ML Oral Solution Take as directed 1 kit 0    amLODIPine 5 MG Oral Tab Take 1 tablet (5 mg total) by mouth daily. 90 tablet 0    Biotin 5000 MCG Oral Cap Take by mouth.      aspirin 81 MG Oral Tab EC Take 2 tablets (162 mg total) by mouth daily.      carvedilol 3.125 MG Oral Tab Take 1 tablet (3.125 mg total) by mouth 2 (two) times daily with meals. 180 tablet 3    Acetaminophen 500 MG Oral Cap 2 tablet EVERY 4 HOURS (route: oral)      albuterol (PROVENTIL HFA) 108 (90 Base) MCG/ACT Inhalation Aero Soln Inhale 2 puffs into the lungs every 6 (six) hours as needed for Wheezing. 2 each 0    Calcium-Phosphorus-Vitamin D (CITRACAL +D3 OR) Take by mouth daily.      albuterol sulfate (2.5 MG/3ML) 0.083% Inhalation Nebu Soln Take 3 mL (2.5 mg total) by nebulization every 6 (six) hours as needed for Wheezing or Shortness of Breath. 30 each 0    fluticasone-Umeclidin-Vilant (TRELEGY ELLIPTA) 100-62.5-25 MCG/INH Inhalation Aerosol Powder, Breath Activated Inhale 1 puff into the lungs daily.      Multiple  Vitamin (MULTIVITAMIN OR) Take by mouth.      Flaxseed, Linseed, (FLAX SEED OIL OR) Take by mouth.      Coenzyme Q10 (CO Q 10 OR) Take by mouth.      Stress Formula/Zinc Oral Tab Take 1 tablet by mouth daily.      predniSONE 10 MG Oral Tab 40 mg for 2 days then 30 mg for 2 days then 20 mg for 2 days  then stop (Patient not taking: Reported on 3/26/2025) 18 tablet 0     Allergies:Allergies[1]    Past Medical History:    Acute deep vein thrombosis (DVT) of proximal vein of left lower extremity (HCC)    Allergic rhinitis    Mostly in fall    Anxiety    Arthritis    Cataract    Colon adenomas    at least 3    COPD (chronic obstructive pulmonary disease) (HCC)    Depression    Disorder of thyroid    Essential hypertension    High blood pressure    Major depression    Major depressive disorder with single episode, in partial remission    Meningioma (HCC)    July 2017 started with dizzy spell and visual disturbance.     Osteoarthritis      Past Surgical History:   Procedure Laterality Date    Cataract      Bilateral    Colonoscopy  12/01/2021    Colonoscopy  12-    Incision and drainage Right 06/11/2019    sebaceous cyst right arm    Other Bilateral     vein surgery     Other Left     L arm fracture repair    Thyroid lobectomy,unilat  02/03/2020      Family History   Problem Relation Age of Onset    Cancer Mother 68        renal cancer    Hypertension Mother     Kidney Cancer Mother     Diabetes Father     Cancer Sister         Passed Nov 5, 2024 from strokes    Prostate Cancer Brother 56    Prostate Cancer Brother 58    Kidney Cancer Brother         Kidney cancer. Removed kidney 10-    Diabetes Paternal Grandmother     Colon Cancer Cousin     Breast Cancer Neg     Ovarian Cancer Neg     Pancreatic Cancer Neg       Social History:   Social History     Socioeconomic History    Marital status: Single   Tobacco Use    Smoking status: Some Days     Current packs/day: 0.25     Average packs/day: 0.3 packs/day for  75.0 years (25.0 ttl pk-yrs)     Types: Cigarettes    Smokeless tobacco: Never    Tobacco comments:     Smokes 4 cigarettes daily   Vaping Use    Vaping status: Never Used   Substance and Sexual Activity    Alcohol use: Yes     Alcohol/week: 11.0 standard drinks of alcohol     Types: 11 Standard drinks or equivalent per week     Comment: 2 drinks daily    Drug use: No   Other Topics Concern    Caffeine Concern Yes     Comment: coffee    Pt has a pacemaker No    Pt has a defibrillator No    Reaction to local anesthetic No     Social Drivers of Health     Food Insecurity: No Food Insecurity (3/26/2025)    NCSS - Food Insecurity     Worried About Running Out of Food in the Last Year: No     Ran Out of Food in the Last Year: No   Transportation Needs: No Transportation Needs (3/26/2025)    NCSS - Transportation     Lack of Transportation: No   Housing Stability: Not At Risk (3/26/2025)    NCSS - Housing/Utilities     Has Housing: Yes     Worried About Losing Housing: No     Unable to Get Utilities: No        Objective:   Physical Exam  Vitals and nursing note reviewed.   Constitutional:       Appearance: Normal appearance.   HENT:      Head: Normocephalic and atraumatic.   Cardiovascular:      Rate and Rhythm: Normal rate and regular rhythm.      Pulses: Normal pulses.      Heart sounds: Normal heart sounds.   Pulmonary:      Effort: Pulmonary effort is normal.      Breath sounds: Normal breath sounds.   Abdominal:      Palpations: Abdomen is soft.   Musculoskeletal:      Cervical back: Normal range of motion and neck supple.   Skin:     Findings: Erythema present.   Neurological:      Mental Status: She is alert. Mental status is at baseline.         Assessment & Plan:   1. Rash -eczema, I will start cortisone cream, referral for dermatology   2. Primary hypertension -monitor blood pressure continue current medication   3. Stage 3 chronic kidney disease, unspecified whether stage 3a or 3b CKD (HCC)    Will check  BMP    Orders Placed This Encounter   Procedures    Basic Metabolic Panel (8) [E]       Meds This Visit:  Requested Prescriptions     Signed Prescriptions Disp Refills    triamcinolone 0.1 % External Cream 60 g 3     Sig: Use bid       Imaging & Referrals:  [facility] - INTERNAL  CT CALCIUM SCORING            [1]   Allergies  Allergen Reactions    Meloxicam RENAL INSUFFICIENCY     \"affected my kidneys\"      Chantix [Varenicline] NAUSEA ONLY     Other reaction(s): mood swings    Strawberries RASH     Hives

## 2025-03-31 RX ORDER — AMLODIPINE BESYLATE 5 MG/1
5 TABLET ORAL DAILY
Qty: 90 TABLET | Refills: 3 | Status: SHIPPED | OUTPATIENT
Start: 2025-03-31

## 2025-03-31 NOTE — TELEPHONE ENCOUNTER
Please kindly review this medication    [x] FAILS PROTOCOL            [] Controlled Substance             [] Medication not previously prescribed by Provider            [] Medication is not active on patient's medication list            [] Due for appointment- no future appointment scheduled            [] BP reading            [x] Labs/Imaging            [] Depression Screening            [] Asthma protocol    [] HAS NO PROTOCOL ATTACHED

## 2025-04-17 ENCOUNTER — OFFICE VISIT (OUTPATIENT)
Dept: DERMATOLOGY CLINIC | Facility: CLINIC | Age: 75
End: 2025-04-17
Payer: MEDICARE

## 2025-04-17 DIAGNOSIS — L72.0 MILIA: ICD-10-CM

## 2025-04-17 DIAGNOSIS — L30.9 DERMATITIS: Primary | ICD-10-CM

## 2025-04-17 PROCEDURE — 99213 OFFICE O/P EST LOW 20 MIN: CPT | Performed by: PHYSICIAN ASSISTANT

## 2025-04-17 RX ORDER — MOMETASONE FUROATE 1 MG/G
1 CREAM TOPICAL DAILY
Qty: 45 G | Refills: 1 | Status: SHIPPED | OUTPATIENT
Start: 2025-04-17

## 2025-04-17 NOTE — PROGRESS NOTES
HPI:    Patient ID: Millie Elder is a 74 year old female.    Patient presents with rash to arms and left leg. Red, itchy and bumpy. Using triamcinolone with relief, but uses daily and when she stops it will flare up again. No draining or tenderness noted. Hx of allergies to medications noted. She notes she has stress from her partner being in the hospital. She also has lesions of concern on her nose. No draining or tenderness noted. No scaling noted. No erythema noted. Hx of allergies to medications noted.        Review of Systems   Constitutional:  Negative for chills and fever.   Musculoskeletal:  Negative for arthralgias and myalgias.   Skin:  Positive for rash. Negative for color change and wound.          Current Medications[1]  Allergies:Allergies[2]   LMP 11/03/2005   There is no height or weight on file to calculate BMI.  PHYSICAL EXAM:   Physical Exam  Constitutional:       General: She is not in acute distress.     Appearance: Normal appearance.   Skin:     General: Skin is warm and dry.      Findings: Rash present.      Comments: Milia noted on the right side of the dorsum of nose. No draining or tenderness noted. No scaling ntoed. No erythema noted.     Eczematous areas noted on the arms and the legs. No draining or tenderness noted. Scaling ntoed. Erythema noted.    Neurological:      Mental Status: She is alert and oriented to person, place, and time.                ASSESSMENT/PLAN:   1. Dermatitis  -After discussion with patient, advised the following:  -Start mometasone  -Educated to apply 2 times per day for the next 2 weeks  -Update in 2 weeks  -Could be due to stress.   -To call or follow-up with worsening symptoms or concerns  -Patient was agreeable to plan and will comply with discussion above.       2. Milia  -After discussion with patient, advised the following:  -Use differin gel for the nose  -Educated to apply small amount at night  -Use with moisturizers.   -To call or follow-up with  worsening symptoms or concerns  -Patient was agreeable to plan and will comply with discussion above.         No orders of the defined types were placed in this encounter.      Meds This Visit:  Requested Prescriptions     Signed Prescriptions Disp Refills    Mometasone Furoate 0.1 % External Cream 45 g 1     Sig: Apply 1 Application topically daily. Apply a thin film to the affected area(s).       Imaging & Referrals:  None         ID#[id number]       [1]   Current Outpatient Medications   Medication Sig Dispense Refill    Mometasone Furoate 0.1 % External Cream Apply 1 Application topically daily. Apply a thin film to the affected area(s). 45 g 1    amLODIPine 5 MG Oral Tab Take 1 tablet (5 mg total) by mouth daily. 90 tablet 3    triamcinolone 0.1 % External Cream Use bid 60 g 3    Na Sulfate-K Sulfate-Mg Sulf (SUPREP BOWEL PREP KIT) 17.5-3.13-1.6 GM/177ML Oral Solution Take as directed 1 kit 0    Biotin 5000 MCG Oral Cap Take by mouth.      aspirin 81 MG Oral Tab EC Take 2 tablets (162 mg total) by mouth daily.      carvedilol 3.125 MG Oral Tab Take 1 tablet (3.125 mg total) by mouth 2 (two) times daily with meals. 180 tablet 3    Acetaminophen 500 MG Oral Cap 2 tablet EVERY 4 HOURS (route: oral)      albuterol (PROVENTIL HFA) 108 (90 Base) MCG/ACT Inhalation Aero Soln Inhale 2 puffs into the lungs every 6 (six) hours as needed for Wheezing. 2 each 0    Calcium-Phosphorus-Vitamin D (CITRACAL +D3 OR) Take by mouth daily.      albuterol sulfate (2.5 MG/3ML) 0.083% Inhalation Nebu Soln Take 3 mL (2.5 mg total) by nebulization every 6 (six) hours as needed for Wheezing or Shortness of Breath. 30 each 0    fluticasone-Umeclidin-Vilant (TRELEGY ELLIPTA) 100-62.5-25 MCG/INH Inhalation Aerosol Powder, Breath Activated Inhale 1 puff into the lungs daily.      Multiple Vitamin (MULTIVITAMIN OR) Take by mouth.      Flaxseed, Linseed, (FLAX SEED OIL OR) Take by mouth.      Coenzyme Q10 (CO Q 10 OR) Take by mouth.      Stress  Formula/Zinc Oral Tab Take 1 tablet by mouth daily.     [2]   Allergies  Allergen Reactions    Meloxicam RENAL INSUFFICIENCY     \"affected my kidneys\"      Chantix [Varenicline] NAUSEA ONLY     Other reaction(s): mood swings    Strawberries RASH     Hives

## 2025-05-03 ENCOUNTER — OFFICE VISIT (OUTPATIENT)
Dept: DERMATOLOGY CLINIC | Facility: CLINIC | Age: 75
End: 2025-05-03
Payer: MEDICARE

## 2025-05-03 DIAGNOSIS — L82.1 SEBORRHEIC KERATOSIS: ICD-10-CM

## 2025-05-03 DIAGNOSIS — Z12.83 SCREENING EXAM FOR SKIN CANCER: Primary | ICD-10-CM

## 2025-05-03 DIAGNOSIS — D48.5 NEOPLASM OF UNCERTAIN BEHAVIOR OF SKIN: ICD-10-CM

## 2025-05-03 DIAGNOSIS — L72.0 MILIA: ICD-10-CM

## 2025-05-03 PROCEDURE — 88305 TISSUE EXAM BY PATHOLOGIST: CPT | Performed by: PHYSICIAN ASSISTANT

## 2025-05-03 PROCEDURE — 17110 DESTRUCTION B9 LES UP TO 14: CPT | Performed by: PHYSICIAN ASSISTANT

## 2025-05-03 PROCEDURE — 11102 TANGNTL BX SKIN SINGLE LES: CPT | Performed by: PHYSICIAN ASSISTANT

## 2025-05-03 PROCEDURE — 99213 OFFICE O/P EST LOW 20 MIN: CPT | Performed by: PHYSICIAN ASSISTANT

## 2025-05-03 NOTE — PROGRESS NOTES
HPI:    Patient ID: Millie Elder is a 74 year old female.    Patient presents for full body skin exam. Has questions regarding skin issue on the nose. Using differin gel with no improvement. Has a lesion on the left leg posteirorly that bothers her. No draining or tendenress noted. Gets caught on clothing. Denies personal hx of skin cancer. Brother has unknown type of skin cancer. No draining or tenderness noted. Hx of allergies to medications noted.         Review of Systems   Constitutional:  Negative for chills and fever.   Musculoskeletal:  Negative for arthralgias and myalgias.   Skin:  Positive for rash. Negative for color change and wound.          Current Medications[1]  Allergies:Allergies[2]   LMP 11/03/2005   There is no height or weight on file to calculate BMI.  PHYSICAL EXAM:   Physical Exam  Constitutional:       General: She is not in acute distress.     Appearance: Normal appearance.   Skin:     General: Skin is warm and dry.      Findings: Rash present.      Comments: Sks noted thorughout the body. Scattered stuck on appearance noted. No draining or tenderness noted. Tan and brown in color.     Milia noted on the right side of the nose. No draining or tenderness noted. White dome shaped lesions.     Papular lesion noted on the back of left leg. No draining or tenderness noted. About 5 mm in size.    Neurological:      Mental Status: She is alert and oriented to person, place, and time.                ASSESSMENT/PLAN:   1. Screening exam for skin cancer  -After discussion with patient, advised the following:  Reassurance regarding other benign skin lesions. Signs and symptoms of skin cancer, ABCDE's of melanoma discussed with patient. Sunscreen use, sun protection, self exams reviewed. Followup as noted RTC ---routine checkup 6 mos -one year or p.r.n.   -Pt was agreeable to plan and will comply with discussion above.     2. Milia  -After discussion with patient, advised the following:  -Cleaned  area with alcohol   -Used 25 g needle to agitate lesion  -Extracted lesion with lateral pressure with comedone  -To call or follow-up with worsening symptoms or concerns.   -Pt was agreeable to plan and will comply with discussion above.       3. Neoplasm of uncertain behavior of skin  -After discussion with patient, advised the following:  -Performed shave biopsy  -Sent to pathology  -Will call with results.   -Will base treatment on results.   -Care instructions given.   -To call or follow-up with worsening symptoms or concerns.   -Pt was agreeable to plan and will comply with discussion above.   - Specimen to Pathology Tissue; Future    4. Seborrheic keratosis  -After discussion with patient, advised the following:  - Cryosurgery of non-malignant lesion(s)  - Risks, benefits, alternatives and personnel required for cryosurgery reviewed with patient. Discussed the expected redness, as well as the risks of swelling, blistering, crusting, pigmentary changes, and permanent scarring. . Discussed that lesion may need additional treatments in future or may recur. Pt verbalizes understanding and wishes to proceed.   - Cryosurgery performed with Liquid Nitrogen via cryostat spray gun to Seborrheic Keratosis. 6 lesion(s) treated.   - Patient tolerated well and wound care discussed.   -Pt was agreeable to plan and will comply with discussion above.           Orders Placed This Encounter   Procedures    Specimen to Pathology Tissue       Meds This Visit:  Requested Prescriptions      No prescriptions requested or ordered in this encounter       Imaging & Referrals:  None         ID#[id number]         [1]   Current Outpatient Medications   Medication Sig Dispense Refill    Mometasone Furoate 0.1 % External Cream Apply 1 Application topically daily. Apply a thin film to the affected area(s). 45 g 1    amLODIPine 5 MG Oral Tab Take 1 tablet (5 mg total) by mouth daily. 90 tablet 3    triamcinolone 0.1 % External Cream Use bid 60 g 3     Na Sulfate-K Sulfate-Mg Sulf (SUPREP BOWEL PREP KIT) 17.5-3.13-1.6 GM/177ML Oral Solution Take as directed 1 kit 0    Biotin 5000 MCG Oral Cap Take by mouth.      aspirin 81 MG Oral Tab EC Take 2 tablets (162 mg total) by mouth daily.      carvedilol 3.125 MG Oral Tab Take 1 tablet (3.125 mg total) by mouth 2 (two) times daily with meals. 180 tablet 3    Acetaminophen 500 MG Oral Cap 2 tablet EVERY 4 HOURS (route: oral)      albuterol (PROVENTIL HFA) 108 (90 Base) MCG/ACT Inhalation Aero Soln Inhale 2 puffs into the lungs every 6 (six) hours as needed for Wheezing. 2 each 0    Calcium-Phosphorus-Vitamin D (CITRACAL +D3 OR) Take by mouth daily.      albuterol sulfate (2.5 MG/3ML) 0.083% Inhalation Nebu Soln Take 3 mL (2.5 mg total) by nebulization every 6 (six) hours as needed for Wheezing or Shortness of Breath. 30 each 0    fluticasone-Umeclidin-Vilant (TRELEGY ELLIPTA) 100-62.5-25 MCG/INH Inhalation Aerosol Powder, Breath Activated Inhale 1 puff into the lungs daily.      Multiple Vitamin (MULTIVITAMIN OR) Take by mouth.      Flaxseed, Linseed, (FLAX SEED OIL OR) Take by mouth.      Coenzyme Q10 (CO Q 10 OR) Take by mouth.      Stress Formula/Zinc Oral Tab Take 1 tablet by mouth daily.     [2]   Allergies  Allergen Reactions    Meloxicam RENAL INSUFFICIENCY     \"affected my kidneys\"      Chantix [Varenicline] NAUSEA ONLY     Other reaction(s): mood swings    Strawberries RASH     Hives

## 2025-05-03 NOTE — PROCEDURES
Procedure: Shave biopsy     With the patient is a supine position, the skin was scrubbed with alcohol.  Anesthesia was obtained by injecting 2 mL of 1% Xylocaine with Epinephrine. The skin surrounding the lesion on posteior left leg was placed under tension and the lesion was incised using a dermablade. The specimen was sent for histopathological examination.  Hemostasis was obtained with cautery and/or aluminum chloride.  The biopsy site was dressed with bandaid and petroleum jelly.     The estimated blood loss was < 1mL.     Clinical Dx & Size of lesion(s):    Lesion 1 - SK  - size: 5 mm     Cynthiatolerated the procedure well.  Complications:  none

## 2025-05-12 ENCOUNTER — LAB ENCOUNTER (OUTPATIENT)
Dept: LAB | Age: 75
End: 2025-05-12
Attending: PHYSICIAN ASSISTANT
Payer: MEDICARE

## 2025-05-12 ENCOUNTER — OFFICE VISIT (OUTPATIENT)
Dept: DERMATOLOGY CLINIC | Facility: CLINIC | Age: 75
End: 2025-05-12
Payer: MEDICARE

## 2025-05-12 DIAGNOSIS — L72.0 MILIA: ICD-10-CM

## 2025-05-12 DIAGNOSIS — L65.9 HAIR LOSS: ICD-10-CM

## 2025-05-12 DIAGNOSIS — L65.9 HAIR LOSS: Primary | ICD-10-CM

## 2025-05-12 DIAGNOSIS — E55.9 VITAMIN D DEFICIENCY, UNSPECIFIED: ICD-10-CM

## 2025-05-12 LAB
DEPRECATED HBV CORE AB SER IA-ACNC: 94 NG/ML (ref 50–306)
VIT D+METAB SERPL-MCNC: 67.5 NG/ML (ref 30–100)

## 2025-05-12 PROCEDURE — 82728 ASSAY OF FERRITIN: CPT

## 2025-05-12 PROCEDURE — 36415 COLL VENOUS BLD VENIPUNCTURE: CPT

## 2025-05-12 PROCEDURE — 82306 VITAMIN D 25 HYDROXY: CPT

## 2025-05-12 NOTE — PROGRESS NOTES
HPI:    Patient ID: Millie Elder is a 74 year old female.    Patient presents for hair loss that started about 2 years ago post foot surgery. Noticed the hair loss while showering and combing hair. No draining or tenderness noted. No scaling noted. No itching or pain. Hx of hair loss in the men of her family. Does have stress in her life. No major illnesses noted. States she has noticed it mostly in the front and crown of her scalp. Hx of allergies to medications noted. Has a lesion of concern on the nose. No draining ro tenderness noted. No scaling noted.         Review of Systems   Constitutional:  Negative for chills and fever.   Musculoskeletal:  Negative for arthralgias and myalgias.   Skin:  Positive for rash. Negative for color change and wound.          Current Medications[1]  Allergies:Allergies[2]   LMP 11/03/2005   There is no height or weight on file to calculate BMI.  PHYSICAL EXAM:   Physical Exam  Constitutional:       General: She is not in acute distress.     Appearance: Normal appearance.   Skin:     General: Skin is warm and dry.      Findings: Rash present.      Comments: Milia noted on the right side of dorsum of nose. No draining or tenderness noted. White dome shaped lesion.     Hair thinning noted in the front and crown of scalp. No draining or tenderness noted. No scaling noted. No erythema noted.    Neurological:      Mental Status: She is alert and oriented to person, place, and time.                ASSESSMENT/PLAN:   1. Hair loss  -After discussion with patient, advised the following:  -Check Ferritin and Vitamin D  -Will call with reuslts.   -Will think about clobetasol solution vs rogaine topical.   -Will take 6 months to 1 year to improve  -Likely started after foot surgery.   -To call or follow-up with worsening symptoms or concerns  -Patient was agreeable to plan and will comply with discussion above.     - Vitamin D; Future  - Ferritin; Future    2. Vitamin D deficiency,  unspecified    - Vitamin D; Future    3. Milia  -After discussion with patient, advised the following:  -Cleaned area with alcohol   -Used 25 g needle to agitate lesion  -Extracted lesion with lateral pressure with comedone  -To call or follow-up with worsening symptoms or concerns.   -Pt was agreeable to plan and will comply with discussion above.         Orders Placed This Encounter   Procedures    Vitamin D    Ferritin       Meds This Visit:  Requested Prescriptions      No prescriptions requested or ordered in this encounter       Imaging & Referrals:  None         ID#[id number]       [1]   Current Outpatient Medications   Medication Sig Dispense Refill    amLODIPine 5 MG Oral Tab Take 1 tablet (5 mg total) by mouth daily. 90 tablet 3    Biotin 5000 MCG Oral Cap Take by mouth.      aspirin 81 MG Oral Tab EC Take 2 tablets (162 mg total) by mouth daily.      carvedilol 3.125 MG Oral Tab Take 1 tablet (3.125 mg total) by mouth 2 (two) times daily with meals. 180 tablet 3    Acetaminophen 500 MG Oral Cap 2 tablet EVERY 4 HOURS (route: oral)      albuterol (PROVENTIL HFA) 108 (90 Base) MCG/ACT Inhalation Aero Soln Inhale 2 puffs into the lungs every 6 (six) hours as needed for Wheezing. 2 each 0    Calcium-Phosphorus-Vitamin D (CITRACAL +D3 OR) Take by mouth daily.      albuterol sulfate (2.5 MG/3ML) 0.083% Inhalation Nebu Soln Take 3 mL (2.5 mg total) by nebulization every 6 (six) hours as needed for Wheezing or Shortness of Breath. 30 each 0    fluticasone-Umeclidin-Vilant (TRELEGY ELLIPTA) 100-62.5-25 MCG/INH Inhalation Aerosol Powder, Breath Activated Inhale 1 puff into the lungs daily.      Multiple Vitamin (MULTIVITAMIN OR) Take by mouth.      Flaxseed, Linseed, (FLAX SEED OIL OR) Take by mouth.      Coenzyme Q10 (CO Q 10 OR) Take by mouth.      Stress Formula/Zinc Oral Tab Take 1 tablet by mouth daily.      Mometasone Furoate 0.1 % External Cream Apply 1 Application topically daily. Apply a thin film to the  affected area(s). (Patient not taking: Reported on 5/12/2025) 45 g 1    triamcinolone 0.1 % External Cream Use bid (Patient not taking: Reported on 5/12/2025) 60 g 3    Na Sulfate-K Sulfate-Mg Sulf (SUPREP BOWEL PREP KIT) 17.5-3.13-1.6 GM/177ML Oral Solution Take as directed (Patient not taking: Reported on 5/12/2025) 1 kit 0   [2]   Allergies  Allergen Reactions    Meloxicam RENAL INSUFFICIENCY     \"affected my kidneys\"      Chantix [Varenicline] NAUSEA ONLY     Other reaction(s): mood swings    Strawberries RASH     Hives

## 2025-05-14 NOTE — PROGRESS NOTES
Pt returned call and informed of lab results and options for treatment. Pt states she will try the topical rogaine for now and will let Ginny know if she wants the topical steroid in the future.

## 2025-06-11 NOTE — ED INITIAL ASSESSMENT (HPI)
Patient complain of left leg swelling and pain that started a week ago. No recent travel. Detail Level: Zone Plan: Vaseline under occlusion recommended daily.  Systemic medication discussed.  Patient did follow up with light therapy with Dr. Lujan and patch testing with [facility].  She did not find that light therapy was effective.  Ewing noted several allergens such as gold, nickel, etc.  Patient advised to contact [facility] to verify her allergens. Continue Regimen: Clobetasol ointment BID x two weeks and then PRN Initiate Treatment: Eucrisa BID Render In Strict Bullet Format?: No Initiate Treatment: Clobetasol ointment apply to affected areas twice a day x2 weeks/month, not for face/axilla/groin.

## 2025-06-25 ENCOUNTER — TELEPHONE (OUTPATIENT)
Facility: CLINIC | Age: 75
End: 2025-06-25

## 2025-06-25 RX ORDER — CARVEDILOL 3.12 MG/1
3.12 TABLET ORAL 2 TIMES DAILY WITH MEALS
Qty: 180 TABLET | Refills: 0 | Status: SHIPPED | OUTPATIENT
Start: 2025-06-25

## 2025-06-25 NOTE — TELEPHONE ENCOUNTER
Patient is requesting to speak with an RN.  Patient is having issues getting into the [facility] with colonoscopy preparation instructions.   She would like a call back to go over the instructions.  Patient is scheduled for a Colonoscopy on 6/27/25.  Please call

## 2025-06-25 NOTE — TELEPHONE ENCOUNTER
Please Review. Protocol Failed; No Protocol     Requested Prescriptions   Pending Prescriptions Disp Refills    CARVEDILOL 3.125 MG Oral Tab [Pharmacy Med Name: Carvedilol 3.125 Mg Tab Zydu] 180 tablet 0     Sig: TAKE ONE TABLET BY MOUTH TWICE A DAY WITH MEALS       Hypertension Medications Protocol Failed - 6/25/2025  9:38 AM        Failed - EGFRCR or GFRNAA > 50     GFR Evaluation  EGFRCR: 39 , resulted on 3/26/2025

## 2025-06-27 ENCOUNTER — ANESTHESIA EVENT (OUTPATIENT)
Dept: ENDOSCOPY | Facility: HOSPITAL | Age: 75
End: 2025-06-27
Payer: MEDICARE

## 2025-06-27 ENCOUNTER — ANESTHESIA (OUTPATIENT)
Dept: ENDOSCOPY | Facility: HOSPITAL | Age: 75
End: 2025-06-27
Payer: MEDICARE

## 2025-06-27 ENCOUNTER — HOSPITAL ENCOUNTER (OUTPATIENT)
Facility: HOSPITAL | Age: 75
Setting detail: HOSPITAL OUTPATIENT SURGERY
Discharge: HOME | End: 2025-06-27
Attending: INTERNAL MEDICINE | Admitting: INTERNAL MEDICINE
Payer: MEDICARE

## 2025-06-27 VITALS
WEIGHT: 155 LBS | DIASTOLIC BLOOD PRESSURE: 79 MMHG | OXYGEN SATURATION: 100 % | HEIGHT: 66.5 IN | HEART RATE: 66 BPM | BODY MASS INDEX: 24.62 KG/M2 | SYSTOLIC BLOOD PRESSURE: 144 MMHG | RESPIRATION RATE: 16 BRPM

## 2025-06-27 DIAGNOSIS — Z12.11 SPECIAL SCREENING FOR MALIGNANT NEOPLASMS, COLON: ICD-10-CM

## 2025-06-27 DIAGNOSIS — Z86.0101 HISTORY OF ADENOMATOUS POLYP OF COLON: ICD-10-CM

## 2025-06-27 PROCEDURE — 45385 COLONOSCOPY W/LESION REMOVAL: CPT | Performed by: INTERNAL MEDICINE

## 2025-06-27 PROCEDURE — 45380 COLONOSCOPY AND BIOPSY: CPT | Performed by: INTERNAL MEDICINE

## 2025-06-27 RX ORDER — SODIUM CHLORIDE, SODIUM LACTATE, POTASSIUM CHLORIDE, CALCIUM CHLORIDE 600; 310; 30; 20 MG/100ML; MG/100ML; MG/100ML; MG/100ML
INJECTION, SOLUTION INTRAVENOUS CONTINUOUS
Status: DISCONTINUED | OUTPATIENT
Start: 2025-06-27 | End: 2025-06-27

## 2025-06-27 RX ORDER — NALOXONE HYDROCHLORIDE 0.4 MG/ML
0.08 INJECTION, SOLUTION INTRAMUSCULAR; INTRAVENOUS; SUBCUTANEOUS ONCE AS NEEDED
Status: DISCONTINUED | OUTPATIENT
Start: 2025-06-27 | End: 2025-06-27

## 2025-06-27 RX ORDER — GLYCOPYRROLATE 0.2 MG/ML
INJECTION, SOLUTION INTRAMUSCULAR; INTRAVENOUS AS NEEDED
Status: DISCONTINUED | OUTPATIENT
Start: 2025-06-27 | End: 2025-06-27 | Stop reason: SURG

## 2025-06-27 RX ORDER — LIDOCAINE HYDROCHLORIDE 10 MG/ML
INJECTION, SOLUTION EPIDURAL; INFILTRATION; INTRACAUDAL; PERINEURAL AS NEEDED
Status: DISCONTINUED | OUTPATIENT
Start: 2025-06-27 | End: 2025-06-27 | Stop reason: SURG

## 2025-06-27 RX ADMIN — LIDOCAINE HYDROCHLORIDE 30 MG: 10 INJECTION, SOLUTION EPIDURAL; INFILTRATION; INTRACAUDAL; PERINEURAL at 08:48:00

## 2025-06-27 RX ADMIN — GLYCOPYRROLATE 0.2 MG: 0.2 INJECTION, SOLUTION INTRAMUSCULAR; INTRAVENOUS at 09:00:00

## 2025-06-27 RX ADMIN — SODIUM CHLORIDE, SODIUM LACTATE, POTASSIUM CHLORIDE, CALCIUM CHLORIDE: 600; 310; 30; 20 INJECTION, SOLUTION INTRAVENOUS at 08:45:00

## 2025-06-27 RX ADMIN — SODIUM CHLORIDE, SODIUM LACTATE, POTASSIUM CHLORIDE, CALCIUM CHLORIDE: 600; 310; 30; 20 INJECTION, SOLUTION INTRAVENOUS at 09:23:00

## 2025-06-27 NOTE — OPERATIVE REPORT
Colonoscopy Report    Millie Elder     10/14/1950 Age 74 year old   PCP TANG TRINH MD Endoscopist Andrew Hernandez MD     Date of procedure: 25    Procedure: Colonoscopy w/ biopsy and snare polypectomy    Pre-operative diagnosis: colorectal cancer screening, history of adenomatous colon polyps      Last colonoscopy in 2021 with 7 sub-centimeter polyps removed of which 3 were adenomas    Post-operative diagnosis: see impression    Sedation: monitored anesthesia care (MAC)    Consent: We discussed the risks/benefits and alternatives to this procedure, as well as the planned sedation. Informed consent was obtained from the patient after the risks of the procedure were discussed, including but not limited to bleeding, perforation, aspiration, infection, or possibility of a missed lesion as well as the risks of anesthesia including but not limited to cardiopulmonary complications. The patient signed informed consent and elected to proceed with Colonoscopy with intervention [i.e. Biopsy, control of bleeding, dilatation, polypectomy, endoscopic mucosal resection, etc.] as indicated.    Colonoscopy procedure: Once an adequate level of sedation was obtained a digital rectal exam was completed revealing normal tone and no masses palpated. Then the lubricated tip of the [id number] diagnostic video pediatric colonoscope was carefully inserted and advanced with mild difficulty to the cecum using the air insufflation technique (only Co2 was used for insufflation). The cecum was identified by localizing the trifold, the appendix and the ileocecal valve. Withdrawal was begun with thorough washing and careful examination of the colonic walls and folds. The ascending colon was viewed twice in the forward view. Photodocumentation was obtained. The bowel prep was good. Views of the colon were good with washing. Withdrawal time was 12 minutes.    Air was then withdrawn and the endoscope was removed.  The patient tolerated the procedure well. There were no immediate postoperative complications. The patient’s vital signs were monitored throughout the procedure and remained stable.    Estimated blood loss: insignificant    Specimens collected:  colon polyps, rectal polyp    Complications: none     Colonoscopy findings:    Cecum: normal mucosa and vascular pattern    Ascending colon: There were 2 polyps each measuring 3 mm with each removed by cold biopsy forceps.  Otherwise, normal mucosa and vascular pattern    Transverse colon: There was a 3 mm polyp removed by cold biopsy forceps.  Otherwise, normal mucosa and vascular pattern    Descending colon: normal mucosa and vascular pattern    Sigmoid colon: there was mild diverticulosis. Otherwise, normal mucosa and vascular pattern    Rectum: retroflexed view showed small non-bleeding hemorrhoids.  There was a 4 mm polyp in the distal rectum removed by cold snare polypectomy. Otherwise, normal mucosa and vascular pattern.    Impression:  1. 4 diminutive polyps removed  2. Mild sigmoid colon diverticulosis  3. Small hemorrhoids  4. Otherwise, unremarkable colonoscopy    Recommend:  1. Await pathology.   2. Repeat colonoscopy interval pending final pathology results. If new signs or symptoms develop, procedure may need to be repeated sooner.   3. Continue your current medications  4. Increase fiber in the diet  5. Follow up with your primary care physician on a routine basis    >>>If biopsies were performed and you have not received your pathology results either by phone or letter within 2 weeks, please call our office at [phone].    MD [facility]  6/27/2025

## 2025-06-27 NOTE — ANESTHESIA POSTPROCEDURE EVALUATION
Patient: Millie Elder    Procedure Summary       Date: 06/27/25 Room / Location: [facility] ENDOSCOPY 01 / [facility] ENDOSCOPY    Anesthesia Start: 0845 Anesthesia Stop: 0924    Procedure: COLONOSCOPY Diagnosis:       Special screening for malignant neoplasms, colon      History of adenomatous polyp of colon      (Special screening for malignant neoplasms, colon/ History of adenomatous polyp of colon)    Surgeons: Andrew Hernandez MD Anesthesiologist: Konstantin Lopez CRNA    Anesthesia Type: MAC ASA Status: 3            Anesthesia Type: MAC    Vitals Value Taken Time   /73 06/27/25 09:24   Temp 36.3 06/27/25 09:24   Pulse 79 06/27/25 09:23   Resp 20 06/27/25 09:23   SpO2 99 % 06/27/25 09:23   Vitals shown include unfiled device data.    [facility] AN Post Evaluation:   Patient Evaluated in PACU  Patient Participation: complete - patient participated  Level of Consciousness: awake and awake and alert  Pain Score: 0  Pain Management: adequate  Airway Patency:patent  Dental exam unchanged from preop  Yes    Nausea/Vomiting: none  Cardiovascular Status: acceptable, blood pressure returned to baseline and stable  Respiratory Status: acceptable  Postoperative Hydration acceptable      Konstantin Lopez CRNA  6/27/2025 9:24 AM

## 2025-06-27 NOTE — H&P
History & Physical Examination    Patient Name: Millie Elder  MRN: [id number]  CSN: [id number]  YOB: 1950    Diagnosis: colorectal cancer screening, history of adenomatous colon polyps     Last colonoscopy in December 2021 with 7 sub-centimeter polyps removed of which 3 were adenomas    Prescriptions Prior to Admission[1]  Current Hospital Medications[2]    Allergies: Allergies[3]    Past Medical History[4]  Past Surgical History[5]  Family History[6]  Social History     Tobacco Use    Smoking status: Some Days     Current packs/day: 0.25     Average packs/day: 0.3 packs/day for 75.0 years (25.0 ttl pk-yrs)     Types: Cigarettes    Smokeless tobacco: Never    Tobacco comments:     Smokes 4 cigarettes daily   Substance Use Topics    Alcohol use: Yes     Alcohol/week: 11.0 standard drinks of alcohol     Types: 11 Standard drinks or equivalent per week     Comment: 2 drinks daily       SYSTEM Check if Physical Exam is Normal If not normal, please explain:   HEENT [ X]    NECK  [ X]    HEART [ X]    LUNGS [ X]    ABDOMEN [ X]    EXTREMITIES [ X]      General:awake, cooperative, no acute distress  HEENT: EOMI, no scleral icterus, MMM; oral pharnyx is without exudates or lesions  Neck: no lymphadenopathy; thyroid is not enlarged and without nodules  CV: RRR  Resp: non-labored breathing  Abd: soft, non-tender, non-distended  Ext: no lower extremity swelling  Neuro: Alert, Oriented X 3  Skin: no rashes, bruises  Psych: normal affect  I have discussed the risks and benefits and alternatives of the procedure with the patient/family.  They understand and agreed to proceed with plan of care.   Andrew Hernandez MD  [facility] - Gastroenterology  6/27/2025  8:45 AM                   [1]   Medications Prior to Admission   Medication Sig Dispense Refill Last Dose/Taking    carvedilol 3.125 MG Oral Tab Take 1 tablet (3.125 mg total) by mouth 2 (two) times daily with meals. 180 tablet 0 6/26/2025     Mometasone Furoate 0.1 % External Cream Apply 1 Application topically daily. Apply a thin film to the affected area(s). (Patient taking differently: Apply 1 Application topically daily as needed. Apply a thin film to the affected area(s).) 45 g 1 6/26/2025    amLODIPine 5 MG Oral Tab Take 1 tablet (5 mg total) by mouth daily. 90 tablet 3 6/26/2025    Na Sulfate-K Sulfate-Mg Sulf (SUPREP BOWEL PREP KIT) 17.5-3.13-1.6 GM/177ML Oral Solution Take as directed 1 kit 0 6/26/2025    Biotin 5000 MCG Oral Cap Take by mouth.   6/24/2025    aspirin 81 MG Oral Tab EC Take 2 tablets (162 mg total) by mouth in the morning.   6/24/2025    Acetaminophen 500 MG Oral Cap    6/26/2025    albuterol (PROVENTIL HFA) 108 (90 Base) MCG/ACT Inhalation Aero Soln Inhale 2 puffs into the lungs every 6 (six) hours as needed for Wheezing. 2 each 0 6/27/2025 Morning    Calcium-Phosphorus-Vitamin D (CITRACAL +D3 OR) Take by mouth in the morning.   6/24/2025    albuterol sulfate (2.5 MG/3ML) 0.083% Inhalation Nebu Soln Take 3 mL (2.5 mg total) by nebulization every 6 (six) hours as needed for Wheezing or Shortness of Breath. 30 each 0 6/27/2025 Morning    fluticasone-Umeclidin-Vilant (TRELEGY ELLIPTA) 100-62.5-25 MCG/INH Inhalation Aerosol Powder, Breath Activated Inhale 1 puff into the lungs in the morning.   6/26/2025    Multiple Vitamin (MULTIVITAMIN OR) Take by mouth.   6/24/2025    Flaxseed, Linseed, (FLAX SEED OIL OR) Take by mouth.   6/24/2025    Coenzyme Q10 (CO Q 10 OR) Take by mouth.   6/24/2025    Stress Formula/Zinc Oral Tab Take 1 tablet by mouth in the morning.   6/24/2025   [2]   Current Facility-Administered Medications   Medication Dose Route Frequency    lactated ringers infusion   Intravenous Continuous    lactated ringers infusion   Intravenous Continuous    naloxone (Narcan) 0.4 MG/ML injection 0.08 mg  0.08 mg Intravenous Once PRN   [3]   Allergies  Allergen Reactions    Meloxicam RENAL INSUFFICIENCY     \"affected my  kidneys\"      Chantix [Varenicline] NAUSEA ONLY     Other reaction(s): mood swings    Strawberries RASH     Hives     [4]   Past Medical History:   Acute deep vein thrombosis (DVT) of proximal vein of left lower extremity (HCC)    Allergic rhinitis    Mostly in fall    Anesthesia complication    last time after a colonoscopy pt went into a fib    Anxiety    Arthritis    Cataract    Colon adenomas    at least 3    COPD (chronic obstructive pulmonary disease) (HCC)    no O2    Depression    Disorder of thyroid    nodule that was removed    Essential hypertension    High blood pressure    Major depression    Major depressive disorder with single episode, in partial remission    Meningioma (HCC)    July 2017 started with dizzy spell and visual disturbance.     Osteoarthritis    Renal disorder    CKD Stage 3    Visual impairment    reading glasses   [5]   Past Surgical History:  Procedure Laterality Date    Cataract      Bilateral    Colonoscopy  12/01/2021    Colonoscopy  12-    Incision and drainage Right 06/11/2019    sebaceous cyst right arm    Other Bilateral     vein surgery     Other Left     L arm fracture repair    Other surgical history Left 2023    ankle surgery    Thyroid lobectomy,unilat  02/03/2020   [6]   Family History  Problem Relation Age of Onset    Cancer Mother 68        renal cancer    Hypertension Mother     Kidney Cancer Mother     Diabetes Father     Cancer Sister         Passed Nov 5, 2024 from strokes    Prostate Cancer Brother 56    Prostate Cancer Brother 58    Kidney Cancer Brother         Kidney cancer. Removed kidney 10-    Diabetes Paternal Grandmother     Colon Cancer Cousin     Breast Cancer Neg     Ovarian Cancer Neg     Pancreatic Cancer Neg

## 2025-06-27 NOTE — ANESTHESIA PREPROCEDURE EVALUATION
Anesthesia PreOp Note    HPI:     Millie Elder is a 74 year old female who presents for preoperative consultation requested by: Andrew Hernandez MD    Date of Surgery: 6/27/2025    Procedure(s):  COLONOSCOPY  Indication: Special screening for malignant neoplasms, colon/ History of adenomatous polyp of colon    Relevant Problems   No relevant active problems       NPO:  Last Liquid Consumption Date: 06/27/25  Last Liquid Consumption Time: 0545  Last Solid Consumption Date: 06/25/25  Last Solid Consumption Time: 0000  Last Liquid Consumption Date: 06/27/25          History Review:  Patient Active Problem List    Diagnosis Date Noted    Anxiety 09/25/2024    Displaced bimalleolar fracture of left lower leg, subsequent encounter for closed fracture with routine healing 04/21/2023    Displaced fracture of fifth metatarsal bone, right foot, subsequent encounter for fracture with routine healing 04/21/2023    Malignant neoplasm of frontal lobe (HCC) 04/21/2023    Other abnormalities of gait and mobility 04/21/2023    Other symptoms and signs involving the musculoskeletal system 04/21/2023    Partially vaccinated for covid-19 04/21/2023    Paroxysmal atrial fibrillation (HCC) 04/21/2023    Unspecified fall, subsequent encounter 04/21/2023    Unsteadiness on feet 04/21/2023    Venous insufficiency (chronic) (peripheral) 04/21/2023    Chronic obstructive pulmonary disease, unspecified (HCC) 04/21/2023    Acute deep vein thrombosis (DVT) of proximal vein of left lower extremity (HCC) 08/25/2021    CKD (chronic kidney disease) stage 3, GFR 30-59 ml/min (HCC) 01/22/2020    Meningioma (HCC) 07/24/2017    Actinic keratosis 06/23/2015    Disorder of kidney and ureter 03/19/2012    Tobacco use disorder 03/12/2012    Nicotine dependence, unspecified, uncomplicated 03/12/2012    Hyperlipidemia 08/20/2011    Chronic airway obstruction (HCC) 02/08/2011    Hypertension, benign 03/05/2009       Past Medical History[1]    Past  Surgical History[2]    Prescriptions Prior to Admission[3]  Current Medications and Prescriptions Ordered in Epic[4]    Allergies[5]    Family History[6]  Social Hx on file[7]    Available pre-op labs reviewed.             Vital Signs:  Body mass index is 24.64 kg/m².   height is 1.689 m (5' 6.5\") and weight is 70.3 kg (155 lb). Her blood pressure is 136/75 and her pulse is 59. Her respiration is 15 and oxygen saturation is 97%.   Vitals:    06/25/25 1032 06/27/25 0808   BP:  136/75   Pulse:  59   Resp:  15   SpO2:  97%   Weight: 70.3 kg (155 lb)    Height: 1.689 m (5' 6.5\")         Anesthesia Evaluation     Patient summary reviewed and Nursing notes reviewed    History of anesthetic complications   Airway   Mallampati: II  TM distance: >3 FB  Neck ROM: full  Dental - Dentition appears grossly intact     Pulmonary - normal exam   (+) COPD  Cardiovascular - normal exam  (+) hypertension, dysrhythmias    Neuro/Psych    (+)  anxiety/panic attacks,  depression      GI/Hepatic/Renal    (+) chronic renal disease    Endo/Other    Abdominal  - normal exam                 Anesthesia Plan:   ASA:  3  Plan:   MAC  Informed Consent Plan and Risks Discussed With:  Patient      I have informed Millie Elder and/or legal guardian or family member of the nature of the anesthetic plan, benefits, risks including possible dental damage if relevant, major complications, and any alternative forms of anesthetic management.   All of the patient's questions were answered to the best of my ability. The patient desires the anesthetic management as planned.  Konstantin Lopez CRNA  6/27/2025 8:22 AM  Present on Admission:  **None**           [1]   Past Medical History:   Acute deep vein thrombosis (DVT) of proximal vein of left lower extremity (HCC)    Allergic rhinitis    Mostly in fall    Anesthesia complication    last time after a colonoscopy pt went into a fib    Anxiety    Arthritis    Cataract    Colon adenomas    at least 3    COPD  (chronic obstructive pulmonary disease) (HCC)    no O2    Depression    Disorder of thyroid    nodule that was removed    Essential hypertension    High blood pressure    Major depression    Major depressive disorder with single episode, in partial remission    Meningioma (HCC)    July 2017 started with dizzy spell and visual disturbance.     Osteoarthritis    Renal disorder    CKD Stage 3    Visual impairment    reading glasses   [2]   Past Surgical History:  Procedure Laterality Date    Cataract      Bilateral    Colonoscopy  12/01/2021    Colonoscopy  12-    Incision and drainage Right 06/11/2019    sebaceous cyst right arm    Other Bilateral     vein surgery     Other Left     L arm fracture repair    Other surgical history Left 2023    ankle surgery    Thyroid lobectomy,unilat  02/03/2020   [3]   Medications Prior to Admission   Medication Sig Dispense Refill Last Dose/Taking    carvedilol 3.125 MG Oral Tab Take 1 tablet (3.125 mg total) by mouth 2 (two) times daily with meals. 180 tablet 0 6/26/2025    Mometasone Furoate 0.1 % External Cream Apply 1 Application topically daily. Apply a thin film to the affected area(s). (Patient taking differently: Apply 1 Application topically daily as needed. Apply a thin film to the affected area(s).) 45 g 1 6/26/2025    amLODIPine 5 MG Oral Tab Take 1 tablet (5 mg total) by mouth daily. 90 tablet 3 6/26/2025    Na Sulfate-K Sulfate-Mg Sulf (SUPREP BOWEL PREP KIT) 17.5-3.13-1.6 GM/177ML Oral Solution Take as directed 1 kit 0 6/26/2025    Biotin 5000 MCG Oral Cap Take by mouth.   6/24/2025    aspirin 81 MG Oral Tab EC Take 2 tablets (162 mg total) by mouth in the morning.   6/24/2025    Acetaminophen 500 MG Oral Cap    6/26/2025    albuterol (PROVENTIL HFA) 108 (90 Base) MCG/ACT Inhalation Aero Soln Inhale 2 puffs into the lungs every 6 (six) hours as needed for Wheezing. 2 each 0 6/27/2025 Morning    Calcium-Phosphorus-Vitamin D (CITRACAL +D3 OR) Take by mouth in the  morning.   6/24/2025    albuterol sulfate (2.5 MG/3ML) 0.083% Inhalation Nebu Soln Take 3 mL (2.5 mg total) by nebulization every 6 (six) hours as needed for Wheezing or Shortness of Breath. 30 each 0 6/27/2025 Morning    fluticasone-Umeclidin-Vilant (TRELEGY ELLIPTA) 100-62.5-25 MCG/INH Inhalation Aerosol Powder, Breath Activated Inhale 1 puff into the lungs in the morning.   6/26/2025    Multiple Vitamin (MULTIVITAMIN OR) Take by mouth.   6/24/2025    Flaxseed, Linseed, (FLAX SEED OIL OR) Take by mouth.   6/24/2025    Coenzyme Q10 (CO Q 10 OR) Take by mouth.   6/24/2025    Stress Formula/Zinc Oral Tab Take 1 tablet by mouth in the morning.   6/24/2025   [4]   Current Facility-Administered Medications Ordered in Epic   Medication Dose Route Frequency Provider Last Rate Last Admin    lactated ringers infusion   Intravenous Continuous Andrew Hernandez MD        lactated ringers infusion   Intravenous Continuous Konstantin Lopez CRNA        naloxone (Narcan) 0.4 MG/ML injection 0.08 mg  0.08 mg Intravenous Once PRN Konstantin Lopez CRNA         No current [facility]-ordered outpatient medications on file.   [5]   Allergies  Allergen Reactions    Meloxicam RENAL INSUFFICIENCY     \"affected my kidneys\"      Chantix [Varenicline] NAUSEA ONLY     Other reaction(s): mood swings    Strawberries RASH     Hives     [6]   Family History  Problem Relation Age of Onset    Cancer Mother 68        renal cancer    Hypertension Mother     Kidney Cancer Mother     Diabetes Father     Cancer Sister         Passed Nov 5, 2024 from strokes    Prostate Cancer Brother 56    Prostate Cancer Brother 58    Kidney Cancer Brother         Kidney cancer. Removed kidney 10-    Diabetes Paternal Grandmother     Colon Cancer Cousin     Breast Cancer Neg     Ovarian Cancer Neg     Pancreatic Cancer Neg    [7]   Social History  Socioeconomic History    Marital status: Single   Tobacco Use    Smoking status: Some Days     Current packs/day: 0.25      Average packs/day: 0.3 packs/day for 75.0 years (25.0 ttl pk-yrs)     Types: Cigarettes    Smokeless tobacco: Never    Tobacco comments:     Smokes 4 cigarettes daily   Vaping Use    Vaping status: Never Used   Substance and Sexual Activity    Alcohol use: Yes     Alcohol/week: 11.0 standard drinks of alcohol     Types: 11 Standard drinks or equivalent per week     Comment: 2 drinks daily    Drug use: No   Other Topics Concern    Caffeine Concern Yes     Comment: coffee    Pt has a pacemaker No    Pt has a defibrillator No    Reaction to local anesthetic No

## 2025-06-27 NOTE — DISCHARGE INSTRUCTIONS
Home Care Instructions for Colonoscopy with Sedation    Diet:  - Resume your regular diet as tolerated unless otherwise instructed.  - Start with light meals to minimize bloating.  - Do not drink alcohol today.    Medication:  - If you have questions about resuming your normal medications, please contact your Primary Care Physician.    Activities:  - Take it easy today. Do not return to work today.  - Do not drive today.  - Do not operate any machinery today (including kitchen equipment).    Colonoscopy:  - You may notice some rectal \"spotting\" (a little blood on the toilet tissue) for a day or two after the exam. This is normal.  - If you experience any rectal bleeding (not spotting), persistent tenderness or sharp severe abdominal pains, oral temperature over 100 degrees Fahrenheit, light-headedness or dizziness, or any other problems, contact your doctor.      **If unable to reach your doctor, please go to the [facility] Emergency Room**    - Your referring physician will receive a full report of your examination.  - If you do not hear from your doctor's office within two weeks of your biopsy, please call them for your results.    You may be able to see your laboratory results in [employer] between 4 and 7 business days.  In some cases, your physician may not have viewed the results before they are released to [employer].  If you have questions regarding your results contact the physician who ordered the test/exam by phone or via [employer] by choosing \"Ask a Medical Question.\"

## 2025-06-30 ENCOUNTER — PATIENT MESSAGE (OUTPATIENT)
Facility: CLINIC | Age: 75
End: 2025-06-30

## 2025-07-01 ENCOUNTER — HOSPITAL ENCOUNTER (OUTPATIENT)
Dept: CT IMAGING | Facility: HOSPITAL | Age: 75
Discharge: HOME | End: 2025-07-01
Attending: INTERNAL MEDICINE
Payer: MEDICARE

## 2025-07-01 DIAGNOSIS — R91.8 MULTIPLE PULMONARY NODULES: ICD-10-CM

## 2025-07-01 PROCEDURE — 71250 CT THORAX DX C-: CPT | Performed by: INTERNAL MEDICINE

## 2025-07-02 ENCOUNTER — TELEPHONE (OUTPATIENT)
Facility: CLINIC | Age: 75
End: 2025-07-02

## 2025-07-02 NOTE — TELEPHONE ENCOUNTER
Recall colonoscopy in 3 years per Dr. Hernandez.     Last done 6/27/2025.     Recall entered into patient outreach for 6/27/2028.     Health maintenance updated.

## 2025-07-02 NOTE — TELEPHONE ENCOUNTER
----- Message from Andrew Hernandze sent at 6/30/2025 12:58 PM CDT -----  GI staff: please place recall for colonoscopy in 3 years     Thanks    Andrew Hernandez MD  [facility] - Gastroenterology  6/30/2025  12:58 PM    ----- Message -----  From: Lab, Background User  Sent: 6/27/2025   4:49 PM CDT  To: Andrew Hernandez MD

## 2025-07-10 ENCOUNTER — HOSPITAL ENCOUNTER (OUTPATIENT)
Dept: MAMMOGRAPHY | Facility: HOSPITAL | Age: 75
Discharge: HOME | End: 2025-07-10
Attending: INTERNAL MEDICINE
Payer: MEDICARE

## 2025-07-10 DIAGNOSIS — R92.8 ABNORMAL MAMMOGRAM OF RIGHT BREAST: ICD-10-CM

## 2025-07-10 PROCEDURE — 77061 BREAST TOMOSYNTHESIS UNI: CPT | Performed by: INTERNAL MEDICINE

## 2025-07-10 PROCEDURE — 77065 DX MAMMO INCL CAD UNI: CPT | Performed by: INTERNAL MEDICINE

## 2025-07-15 ENCOUNTER — TELEPHONE (OUTPATIENT)
Dept: PULMONOLOGY | Facility: CLINIC | Age: 75
End: 2025-07-15

## 2025-07-15 DIAGNOSIS — R91.8 MULTIPLE PULMONARY NODULES: Primary | ICD-10-CM

## 2025-07-15 NOTE — TELEPHONE ENCOUNTER
Spoke with patient and informed of Dr. Schumacher's message/order below. Also informed patient last office visit was Sept 2023, and follow-up appointment needed. Follow-up appointment scheduled for 10/27/25 at 12:15PM. Confirmed date, time, place.     Order added to chronic calendar.

## 2025-07-15 NOTE — TELEPHONE ENCOUNTER
RN, please let the patient know that the CT scan of the chest shows stability of several tiny pulmonary nodules.  This is great news.  Please add to the calendar a repeat noncontrast CT scan of the chest at the 1 year interval.

## (undated) DEVICE — GAUZE SPONGES,12 PLY: Brand: CURITY

## (undated) DEVICE — SUCTION CANISTER, 3000CC,SAFELINER: Brand: DEROYAL

## (undated) DEVICE — GAMMEX® PI HYBRID SIZE 7.5, STERILE POWDER-FREE SURGICAL GLOVE, POLYISOPRENE AND NEOPRENE BLEND: Brand: GAMMEX

## (undated) DEVICE — KIT CLEAN ENDOKIT 1.1OZ GOWNX2

## (undated) DEVICE — GAMMEX® NON-LATEX PI ORTHO SIZE 7.5, STERILE POLYISOPRENE POWDER-FREE SURGICAL GLOVE: Brand: GAMMEX

## (undated) DEVICE — ENCORE® LATEX MICRO SIZE 7, STERILE LATEX POWDER-FREE SURGICAL GLOVE: Brand: ENCORE

## (undated) DEVICE — LASSO POLYPECTOMY SNARE: Brand: LASSO

## (undated) DEVICE — KIT ENDO ORCAPOD 160/180/190

## (undated) DEVICE — ABDOMINAL PAD: Brand: CURITY

## (undated) DEVICE — STERILE TETRA-FLEX CF, ELASTIC BANDAGE, 2" X 5.5YD: Brand: TETRA-FLEX™CF

## (undated) DEVICE — COVER TABLE BACK PADDED HVY DU

## (undated) DEVICE — OCCLUSIVE GAUZE STRIP,3% BISMUTH TRIBROMOPHENATE IN PETROLATUM BLEND: Brand: XEROFORM

## (undated) DEVICE — SUTURE SILK 3-0 1684G

## (undated) DEVICE — Device

## (undated) DEVICE — TRAY SKIN PREP PVP-1

## (undated) DEVICE — SOL  .9 1000ML BTL

## (undated) DEVICE — VIOLET BRAIDED (POLYGLACTIN 910), SYNTHETIC ABSORBABLE SUTURE: Brand: COATED VICRYL

## (undated) DEVICE — GAMMEX® NON-LATEX PI ORTHO SIZE 8, STERILE POLYISOPRENE POWDER-FREE SURGICAL GLOVE: Brand: GAMMEX

## (undated) DEVICE — MINOR GENERAL: Brand: MEDLINE INDUSTRIES, INC.

## (undated) DEVICE — UPPER EXTREMITY: Brand: MEDLINE INDUSTRIES, INC.

## (undated) DEVICE — PROXIMATE SKIN STAPLERS (35 WIDE) CONTAINS 35 STAINLESS STEEL STAPLES (FIXED HEAD): Brand: PROXIMATE

## (undated) DEVICE — DRAPE,UNDERBUTTOCKS,STERILE: Brand: MEDLINE

## (undated) DEVICE — MEDI-VAC NON-CONDUCTIVE SUCTION TUBING 6MM X 1.8M (6FT.) L: Brand: CARDINAL HEALTH

## (undated) DEVICE — DRAPE SHEET LG

## (undated) DEVICE — GOWN SURG AERO BLUE PERF LG

## (undated) DEVICE — INTENDED FOR TISSUE SEPARATION, AND OTHER PROCEDURES THAT REQUIRE A SHARP SURGICAL BLADE TO PUNCTURE OR CUT.: Brand: BARD-PARKER ® STAINLESS STEEL BLADES

## (undated) DEVICE — ARISTA 1 GRAM

## (undated) DEVICE — V2 SPECIMEN COLLECTION MANIFOLD KIT: Brand: NEPTUNE

## (undated) DEVICE — TOWEL OR BLU 16X26 STRL

## (undated) DEVICE — STERILE TETRA-FLEX CF, ELASTIC BANDAGE, 4" X 5.5YD: Brand: TETRA-FLEX™CF

## (undated) DEVICE — DERMABOND LIQUID ADHESIVE

## (undated) DEVICE — SPONGE LAP 18X18 XRAY STRL

## (undated) DEVICE — COTTON UNDERCAST PADDING,REGULAR FINISH: Brand: WEBRIL

## (undated) DEVICE — GIJAW SINGLE-USE BIOPSY FORCEPS WITH NEEDLE: Brand: GIJAW

## (undated) DEVICE — SOLUTION SURG DURA PREP HAZMAT

## (undated) DEVICE — ESMARK: Brand: DEROYAL

## (undated) DEVICE — GOWN SURG AERO BLUE PERF XLG

## (undated) DEVICE — STIRRUP STRAP W/SLING RING

## (undated) DEVICE — HEAD & NECK: Brand: MEDLINE INDUSTRIES, INC.

## (undated) DEVICE — SPECIALTY ARM SLING: Brand: DEROYAL

## (undated) DEVICE — BANDAGE ROLL,100% COTTON, 6 PLY, LARGE: Brand: KERLIX

## (undated) DEVICE — CHLORAPREP 26ML APPLICATOR

## (undated) DEVICE — SUTURE CHROMIC GUT 3-0 SH

## (undated) DEVICE — UNDYED BRAIDED (POLYGLACTIN 910), SYNTHETIC ABSORBABLE SUTURE: Brand: COATED VICRYL

## (undated) DEVICE — DRAPE SRG 131X112X63IN GYN URO

## (undated) DEVICE — ENCORE® LATEX ACCLAIM SIZE 7.5, STERILE LATEX POWDER-FREE SURGICAL GLOVE: Brand: ENCORE

## (undated) DEVICE — 60 ML SYRINGE REGULAR TIP: Brand: MONOJECT

## (undated) DEVICE — TETRA-FLEX CF WOVEN LATEX FREE ELASTIC BANDAGE 4" X 5.5 YD: Brand: TETRA-FLEX™CF

## (undated) DEVICE — WEBRIL COTTON UNDERCAST PADDING: Brand: WEBRIL

## (undated) DEVICE — SUTURE SILK 2-0 SA65H

## (undated) DEVICE — SUTURE PROLENE 4-0 FS-2

## (undated) DEVICE — V2 SPECIMEN COLLECTION TRAY: Brand: NEPTUNE

## (undated) DEVICE — CAUTERY BLADE 2IN INS E1455

## (undated) DEVICE — PEN: MARKING STD PT 100/CS: Brand: MEDICAL ACTION INDUSTRIES

## (undated) DEVICE — GAMMEX® PI HYBRID SIZE 7, STERILE POWDER-FREE SURGICAL GLOVE, POLYISOPRENE AND NEOPRENE BLEND: Brand: GAMMEX

## (undated) DEVICE — DRAPE SRG 70X60IN SPLT U IMPRV

## (undated) DEVICE — NECK ACCESSORY: Brand: MEDLINE INDUSTRIES, INC.

## (undated) DEVICE — SUTURE ETHILON 3-0 PS-2

## (undated) NOTE — MR AVS SNAPSHOT
[address] [facility] [address]  [phone]               Thank you for choosing us for your health care visit with Cici Mayes MD.  We are glad to serve you and happy to provide you with this summary of your BP Pulse Temp Weight          123/76 mmHg 67 99.2 °F (37.3 °C) 191 lb (86.637 kg)           Current Medications          This list is accurate as of: 6/12/17  2:55 PM.  Always use your most recent med list.                * Albuterol Sulfate  (90 B You can access your MyChart to more actively manage your health care and view more details from this visit by going to [email or web address].   If you've recently had a stay at the Hospital you can access your discharge instructions in [address] by anand

## (undated) NOTE — MR AVS SNAPSHOT
[address] [facility] [address]  [phone]               Thank you for choosing us for your health care visit with Rosie Cuenca MD.  We are glad to serve you and happy to provide you with this summary of your Today's Vital Signs     BP Pulse Temp Height Weight BMI    151/87 mmHg 66 98 °F (36.7 °C) (Oral) 5' 8\" (1.727 m) 189 lb (85.73 kg) 28.74 kg/m2         Current Medications          This list is accurate as of: 5/8/17  3:57 PM.  Always use your most recent medications prescribed for you. Read the directions carefully, and ask your doctor or other care provider to review them with you.             MyChart     Visit [employer]  You can access your MyChart to more actively manage your health care and view more deta

## (undated) NOTE — ED AVS SNAPSHOT
[facility] Emergency Department    [address]     [place] [address]    Phone:  [phone]    Fax:  Rainer Tejada   MRN: [id number]    Department:  [facility] Emergency Department   Date of Visit:  5/6 Take 6 tablets (60 mg total) by mouth daily.  Taper 60 mg daily to 0 mg daily over 12 days. (60, 60, 50, 50, 40, 40, 30, 30, 20, 20, 10, 10)            Where to Get Your Medications      You can get these medications from any pharmacy     Bring a paper pres discretion of that Physician.   If you need additional assistance selecting a physician, you may call the [facility] Physician Referral and Class Registration line at [phone] or find a doctor online by visiting [email or web address].    IF THE Additional Information       We are concerned for your overall well being:    - If you are a smoker or have smoked in the last 12 months, we encourage you to explore options for quitting.     - If you have concerns related to behavioral health issues or th

## (undated) NOTE — LETTER
[facility] [address], [address] [place] [address]       01/16/20        Patient: Elan Purpura   YOB: 1950   Date of Visit: 1/16/2020       Dear  Dr. Ibeth Murphy MD,      Thank you for referring Elan Purpura to my practice.   Pl

## (undated) NOTE — LETTER
[address] [place] [address] Rd, [place], [place]     AUTHORIZATION FOR SURGICAL OPERATION OR PROCEDURE    I hereby authorize                                          , my Physician(s) and whomever may be designated as the [address] [place] [address] own blood, or a directed donor transfusion, I will discuss this with my Physician. 4. I consent to the photographing of procedure(s) to be performed for the purposes of advancing medicine, science and/or education, provided my identity is not revealed.  If _______________________________________________________________ ____________________________  (Witness signature)                                                                                                  (Date)                                (Time)

## (undated) NOTE — ED AVS SNAPSHOT
Kusum Fong   MRN: [id number]    Department:  [facility] Emergency Department   Date of Visit:  6/5/2019           Disclosure     Insurance plans vary and the physician(s) referred by the ER may not be covered by your plan.  Please contact within the next three months to obtain basic health screening including reassessment of your blood pressure.     IF THERE IS ANY CHANGE OR WORSENING OF YOUR CONDITION, CALL YOUR PRIMARY CARE PHYSICIAN AT ONCE OR RETURN IMMEDIATELY TO THE EMERGENCY DEPARTMEN

## (undated) NOTE — LETTER
[facility]  [address] [facility] [address]  Authorization for Imaging Procedure      I hereby authorize  __________________, my physician and his/her assistants (if applicable), which may include medical students, residents, and/or fellows, to perform the following procedure and administer such anesthesia as may be determined necessary by my physician: ULTRASOUND GUIDED RIGHT BREAST BIOPSY WITH CLIP PLACEMENT  on Millie Elder.   2.  I recognize that during the procedure, unforeseen conditions may necessitate additional or different procedures than those listed above. I, therefore, further authorize and request that the above-named physician, assistants, or designees perform such procedures as are, in their judgment, necessary and desirable.    3.  My physician has discussed prior to my procedure the potential benefits, risks and side effects of this procedure; the likelihood of achieving goals; and potential problems that might occur during recuperation. They also discussed reasonable alternatives to the procedure, including risks, benefits, and side effects related to the alternatives and risks related to not receiving this procedure. I have had all my questions answered and I acknowledge that no guarantee has been made as to the result that may be obtained.    4.  Should the need arise during my procedure, which includes change of level of care prior to discharge, I also consent to the administration of blood and/or blood products. Further, I understand that despite careful testing and screening of blood or blood products by collecting agencies, I may still be subject to ill effects as a result of receiving a blood transfusion and/or blood products. The following are some, but not all, of the potential risks that can occur: fever and allergic reactions, hemolytic reactions, transmission of diseases such as Hepatitis, AIDS and Cytomegalovirus (CMV) and fluid  overload. In the event that I wish to have an autologous transfusion of my own blood, or a directed donor transfusion, I will discuss this with my physician.  Check only if Refusing Blood or Blood Products  I understand refusal of blood or blood products as deemed necessary by my physician may have serious consequences to my condition to include possible death. I hereby assume responsibility for my refusal and release the hospital, its personnel, and my physicians from any responsibility for the consequences of my refusal.   [  ] Patient Refuses Blood      5.  I authorize the use of any specimen, organs, tissues, body parts or foreign objects that may be removed from my body during the procedure for diagnosis, research or teaching purposes and their subsequent disposal by hospital authorities. I also authorize the release of specimen test results and/or written reports to my treating physician on the hospital medical staff or other referring or consulting physicians involved in my care, at the discretion of the Pathologist or my treating physician.    6.  I consent to the photographing or videotaping of the procedures to be performed, including appropriate portions of my body for medical, scientific, or educational purposes, provided my identity is not revealed by the pictures or by descriptive texts accompanying them. If the procedure has been photographed/videotaped, the physician will obtain the original picture, image, videotape or CD. The hospital will not be responsible for storage, release or maintenance of the picture, image, tape or CD.   7.  I consent to the presence of a  or observers in the operating room as deemed necessary by my physician or their designees.    8.  I recognize that in the event my procedure results in extended X-Ray/fluoroscopy time, I may develop a skin reaction.    9.  If I have a Do Not Attempt Resuscitation (DNAR) order in place, that status will be suspended  while in the operating room, procedural suite, and during the recovery period unless otherwise explicitly stated by me (or a person authorized to consent on my behalf). The performing physician or my attending physician will determine when the applicable recovery period ends for purposes of reinstating the DNAR order.  10.  I acknowledge that my physician has explained sedation/analgesia administration to me including the risk and benefits I consent to the administration of sedation/analgesia as may be necessary or desirable in the judgment of my physician.      I CERTIFY THAT I HAVE READ AND FULLY UNDERSTAND THE ABOVE CONSENT FOR THE PROCEDURE.   Signature of Patient: _____________________________________________________________  Responsible person in case of minor, unconscious: ____________________________________  Relationship to patient:  __________________________________________________________  Signature of Witness: _______________________________Date: _________Time: __________    Statement of Physician: My signature below affirms that prior to the time of the procedure, I have explained to the patient and/or her guardian, the risks and benefits involved in the proposed treatment and any reasonable alternative to the proposed treatment. I have also explained the risks and benefits involved in the refusal of the proposed treatment and have answered the patient's questions. If I have a significant financial interest in a co-management agreement or a significant financial interest in any product or implant, or other significant relationship used in the procedure/surgery, I have disclosed this and had a discussion with my patient.  Signature of Physician:   _________________________________Date:_____________Time:________    Patient Name: Millie Elder : 10/14/1950  Printed: 2024   Medical Record #: [id number]

## (undated) NOTE — LETTER
[facility] [address], [address] [place] [address]       02/11/20        Patient: Kusum Fong   YOB: 1950   Date of Visit: 2/11/2020       Dear  Dr. Ewelina Sims MD,      Thank you for referring Kusum Fong to my practice.   Pl

## (undated) NOTE — LETTER
2/17/2020              [address]        [facility] [address]                                                                               Jurjor Number [id number]         To whom it may concern,    Muna Marr is curr

## (undated) NOTE — LETTER
9/30/2024    Millie Elder        [address]        [facility] [address]            Dear Millie Elder,      Our records indicate that you are due for an appointment for a Colonoscopy with Andrew Hernandez MD. Our doctors are booking out about 3-6 months in advance for procedures.     Please call our office to schedule a phone screening appointment to plan for the procedure(s).   Your medical well-being is important to us.    If your insurance requires a referral, please call your primary care office to request one.      Thank you,      The Physicians and Staff at [facility]

## (undated) NOTE — ED AVS SNAPSHOT
[facility] Emergency Department    [address] [address]     [place] [address]    Phone:  [phone]    Fax:  Rainer Tejada   MRN: [id number]    Department:  [facility] Emergency Department   Date of Visit:  5/6 and Class Registration line at [phone] or find a doctor online by visiting [email or web address].    IF THERE IS ANY CHANGE OR WORSENING OF YOUR CONDITION, CALL YOUR PRIMARY CARE PHYSICIAN AT ONCE OR RETURN IMMEDIATELY TO [address] [place] [address].     If

## (undated) NOTE — ED AVS SNAPSHOT
[facility] Emergency Department  [address]   [place] [address]  Phone:  [phone]  Fax:  [address] [facility]   MRN: [id number]    Department:  [facility] Emergency Department   Date of Visit:  7/18/2017 and Class Registration line at [phone] or find a doctor online by visiting [email or web address].    IF THERE IS ANY CHANGE OR WORSENING OF YOUR CONDITION, CALL YOUR PRIMARY CARE PHYSICIAN AT ONCE OR RETURN IMMEDIATELY TO [address] [place] [address].     If

## (undated) NOTE — LETTER
[facility]  Administration of Anesthesia  IMillie agree to be cared for by a physician anesthesiologist alone and/or with a nurse anesthetist, who is specially trained to monitor me and give me medicine to put me to sleep or keep me comfortable during my procedure    I understand that my anesthesiologist and/or anesthetist is not an employee or agent of [facility] or [employer]. He or she works for [facility]    As the patient asking for anesthesia services, I agree to:  Allow the anesthesiologist (anesthesia doctor) to give me medicine and do additional procedures as necessary. Some examples are: Starting or using an “IV” to give me medicine, fluids or blood during my procedure, and having a breathing tube placed to help me breathe when I’m asleep (intubation). In the event that my heart stops working properly, I understand that my anesthesiologist will make every effort to sustain my life, unless otherwise directed by [facility] Do Not Resuscitate documents.  Tell my anesthesia doctor before my procedure:  If I am pregnant.  The last time that I ate or drank.  iii. All of the medicines I take (including prescriptions, herbal supplements, and pills I can buy without a prescription (including street drugs/illegal medications). Failure to inform my anesthesiologist about these medicines may increase my risk of anesthetic complications.  iv.If I am allergic to anything or have had a reaction to anesthesia before.  I understand how the anesthesia medicine will help me (benefits).  I understand that with any type of anesthesia medicine there are risks:  The most common risks are: nausea, vomiting, sore throat, muscle soreness, damage to my eyes, mouth, or teeth (from breathing tube placement).  Rare risks include: remembering what happened during my procedure, allergic reactions to medications, injury to my airway, heart, lungs, vision, nerves, or  muscles and in extremely rare instances death.  My doctor has explained to me other choices available to me for my care (alternatives).  Pregnant Patients (“epidural”):  I understand that the risks of having an epidural (medicine given into my back to help control pain during labor), include itching, low blood pressure, difficulty urinating, headache or slowing of the baby’s heart. Very rare risks include infection, bleeding, seizure, irregular heart rhythms and nerve injury.  Regional Anesthesia (“spinal”, “epidural”, & “nerve blocks”):  I understand that rare but potential complications include headache, bleeding, infection, seizure, irregular heart rhythms, and nerve injury.    _____________________________________________________________________________  Patient (or Representative) Signature/Relationship to Patient  Date   Time    _____________________________________________________________________________   Name (if used)    Language/Organization   Time    _____________________________________________________________________________  Nurse Anesthetist Signature     Date   Time  _____________________________________________________________________________  Anesthesiologist Signature     Date   Time  I have discussed the procedure and information above with the patient (or patient’s representative) and answered their questions. The patient or their representative has agreed to have anesthesia services.    _____________________________________________________________________________  Witness        Date   Time  I have verified that the signature is that of the patient or patient’s representative, and that it was signed before the procedure  Patient Name: Millie Elder     : 10/14/1950                 Printed: 2025 at 8:46 AM    Medical Record #: [id number]                                            Page 1 of 1  ----------ANESTHESIA CONSENT----------

## (undated) NOTE — LETTER
[address] [place] [id number]      12/1/2022    Dear Jason Alfonso,    It has come to our attention that you are due for: Ct lung  in December. This test was ordered by Dr. Garry Barthel    Please  make sure to keep your appointment  Scheduled for 12/7/22 at 10:00 am.    If you are allergic to iodine or have any questions, please call the office at [phone].        Thank you,         [facility] Clinical Staff

## (undated) NOTE — MR AVS SNAPSHOT
[address] [facility] [id number]  [phone]               Thank you for choosing us for your health care visit with Cr Conner MD.  We are glad to serve you and happy to provide you with this summary of your Commonly known as:  ZITHROMAX Z-BRAYDON           COMBIVENT RESPIMAT  MCG/ACT Aers   Generic drug:  Ipratropium-Albuterol   INHALE 2 PUFFS INTO THE LUNGS EVERY 6 (SIX) HOURS AS NEEDED FOR WHEEZING.            escitalopram 10 MG Tabs   TAKE 1 TABLET (10 MG Call [phone] for help. TTA [facility] is NOT to be used for urgent needs. For medical emergencies, dial 911.         Educational Information     Healthy Diet and Regular Exercise  [employer] for making healthy food choices  -

## (undated) NOTE — LETTER
[facility]  [address], [place], [place]    Authorization for Surgical Operation and Procedure                               I hereby authorize Andrew Hernandez MD, my physician and his/her assistants (if applicable), which may include medical students, residents, and/or fellows, to perform the following surgical operation/ procedure and administer such anesthesia as may be determined necessary by my physician: Operation/Procedure name (s) COLONOSCOPY on Millie Elder   2.   I recognize that during the surgical operation/procedure, unforeseen conditions may necessitate additional or different procedures than those listed above.  I, therefore, further authorize and request that the above-named surgeon, assistants, or designees perform such procedures as are, in their judgment, necessary and desirable.    3.   My surgeon/physician has discussed prior to my surgery the potential benefits, risks and side effects of this procedure; the likelihood of achieving goals; and potential problems that might occur during recuperation.  They also discussed reasonable alternatives to the procedure, including risks, benefits, and side effects related to the alternatives and risks related to not receiving this procedure.  I have had all my questions answered and I acknowledge that no guarantee has been made as to the result that may be obtained.    4.   Should the need arise during my operation/procedure, which includes change of level of care prior to discharge, I also consent to the administration of blood and/or blood products.  Further, I understand that despite careful testing and screening of blood or blood products by collecting agencies, I may still be subject to ill effects as a result of receiving a blood transfusion and/or blood products.  The following are some, but not all, of the potential risks that can occur: fever and allergic reactions, hemolytic reactions, transmission of diseases such  as Hepatitis, AIDS and Cytomegalovirus (CMV) and fluid overload.  In the event that I wish to have an autologous transfusion of my own blood, or a directed donor transfusion, I will discuss this with my physician.  Check only if Refusing Blood or Blood Products  I understand refusal of blood or blood products as deemed necessary by my physician may have serious consequences to my condition to include possible death. I hereby assume responsibility for my refusal and release the hospital, its personnel, and my physicians from any responsibility for the consequences of my refusal.    o  Refuse   5.   I authorize the use of any specimen, organs, tissues, body parts or foreign objects that may be removed from my body during the operation/procedure for diagnosis, research or teaching purposes and their subsequent disposal by hospital authorities.  I also authorize the release of specimen test results and/or written reports to my treating physician on the hospital medical staff or other referring or consulting physicians involved in my care, at the discretion of the Pathologist or my treating physician.    6.   I consent to the photographing or videotaping of the operations or procedures to be performed, including appropriate portions of my body for medical, scientific, or educational purposes, provided my identity is not revealed by the pictures or by descriptive texts accompanying them.  If the procedure has been photographed/videotaped, the surgeon will obtain the original picture, image, videotape or CD.  The hospital will not be responsible for storage, release or maintenance of the picture, image, tape or CD.    7.   I consent to the presence of a  or observers in the operating room as deemed necessary by my physician or their designees.    8.   I recognize that in the event my procedure results in extended X-Ray/fluoroscopy time, I may develop a skin reaction.    9. If I have a Do Not Attempt  Resuscitation (DNAR) order in place, that status will be suspended while in the operating room, procedural suite, and during the recovery period unless otherwise explicitly stated by me (or a person authorized to consent on my behalf). The surgeon or my attending physician will determine when the applicable recovery period ends for purposes of reinstating the DNAR order.  10. Patients having a sterilization procedure: I understand that if the procedure is successful the results will be permanent and it will therefore be impossible for me to inseminate, conceive, or bear children.  I also understand that the procedure is intended to result in sterility, although the result has not been guaranteed.   11. I acknowledge that my physician has explained sedation/analgesia administration to me including the risk and benefits I consent to the administration of sedation/analgesia as may be necessary or desirable in the judgment of my physician.    I CERTIFY THAT I HAVE READ AND FULLY UNDERSTAND THE ABOVE CONSENT TO OPERATION and/or OTHER PROCEDURE.     ____________________________________  _________________________________        ______________________________  Signature of Patient    Signature of Responsible Person                Printed Name of Responsible Person                                      ____________________________________  _____________________________                ________________________________  Signature of Witness        Date  Time         Relationship to Patient    STATEMENT OF PHYSICIAN My signature below affirms that prior to the time of the procedure; I have explained to the patient and/or his/her legal representative, the risks and benefits involved in the proposed treatment and any reasonable alternative to the proposed treatment. I have also explained the risks and benefits involved in refusal of the proposed treatment and alternatives to the proposed treatment and have answered the patient's  questions. If I have a significant financial interest in a co-management agreement or a significant financial interest in any product or implant, or other significant relationship used in this procedure/surgery, I have disclosed this and had a discussion with my patient.     _____________________________________________________              _____________________________  (Signature of Physician)                                                                                         (Date)                                   (Time)  Patient Name: Millie Elder      : 10/14/1950      Printed: 2025     Medical Record #: [id number]                                      Page 1 of 1